# Patient Record
Sex: MALE | Race: WHITE | NOT HISPANIC OR LATINO | Employment: UNEMPLOYED | URBAN - METROPOLITAN AREA
[De-identification: names, ages, dates, MRNs, and addresses within clinical notes are randomized per-mention and may not be internally consistent; named-entity substitution may affect disease eponyms.]

---

## 2017-01-04 ENCOUNTER — OFFICE VISIT - HEALTHEAST (OUTPATIENT)
Dept: VASCULAR SURGERY | Facility: CLINIC | Age: 57
End: 2017-01-04

## 2017-01-04 DIAGNOSIS — F17.200 TOBACCO USE DISORDER: ICD-10-CM

## 2017-01-04 DIAGNOSIS — I87.2 VENOUS DERMATITIS: ICD-10-CM

## 2017-01-04 DIAGNOSIS — I87.2 VENOUS (PERIPHERAL) INSUFFICIENCY: ICD-10-CM

## 2017-01-04 DIAGNOSIS — Z86.718 HISTORY OF DVT (DEEP VEIN THROMBOSIS): ICD-10-CM

## 2017-01-11 ENCOUNTER — OFFICE VISIT - HEALTHEAST (OUTPATIENT)
Dept: VASCULAR SURGERY | Facility: CLINIC | Age: 57
End: 2017-01-11

## 2017-01-11 DIAGNOSIS — I87.2 VENOUS STASIS DERMATITIS OF BOTH LOWER EXTREMITIES: ICD-10-CM

## 2017-01-11 DIAGNOSIS — I87.2 VENOUS (PERIPHERAL) INSUFFICIENCY: ICD-10-CM

## 2017-01-11 DIAGNOSIS — Z86.718 HISTORY OF DVT (DEEP VEIN THROMBOSIS): ICD-10-CM

## 2017-01-18 ENCOUNTER — OFFICE VISIT - HEALTHEAST (OUTPATIENT)
Dept: VASCULAR SURGERY | Facility: CLINIC | Age: 57
End: 2017-01-18

## 2017-01-18 DIAGNOSIS — I87.303 VENOUS HYPERTENSION, CHRONIC, BILATERAL: ICD-10-CM

## 2017-01-18 DIAGNOSIS — I87.2 VENOUS STASIS DERMATITIS OF BOTH LOWER EXTREMITIES: ICD-10-CM

## 2017-01-25 ENCOUNTER — COMMUNICATION - HEALTHEAST (OUTPATIENT)
Dept: INTERNAL MEDICINE | Facility: CLINIC | Age: 57
End: 2017-01-25

## 2017-01-25 DIAGNOSIS — Z86.718 PERSONAL HISTORY OF VENOUS THROMBOSIS AND EMBOLISM: ICD-10-CM

## 2017-01-25 DIAGNOSIS — Z86.718 HISTORY OF DVT (DEEP VEIN THROMBOSIS): ICD-10-CM

## 2017-01-25 DIAGNOSIS — Z79.01 LONG TERM (CURRENT) USE OF ANTICOAGULANTS: ICD-10-CM

## 2017-01-25 DIAGNOSIS — I48.91 ATRIAL FIBRILLATION (H): ICD-10-CM

## 2017-01-26 ENCOUNTER — OFFICE VISIT - HEALTHEAST (OUTPATIENT)
Dept: INTERNAL MEDICINE | Facility: CLINIC | Age: 57
End: 2017-01-26

## 2017-01-26 ENCOUNTER — COMMUNICATION - HEALTHEAST (OUTPATIENT)
Dept: NURSING | Facility: CLINIC | Age: 57
End: 2017-01-26

## 2017-01-26 DIAGNOSIS — Z86.718 HISTORY OF DVT (DEEP VEIN THROMBOSIS): ICD-10-CM

## 2017-01-26 DIAGNOSIS — I48.91 ATRIAL FIBRILLATION, UNSPECIFIED TYPE (H): ICD-10-CM

## 2017-01-26 DIAGNOSIS — I48.91 A-FIB (H): ICD-10-CM

## 2017-01-26 DIAGNOSIS — I87.2 VENOUS (PERIPHERAL) INSUFFICIENCY: ICD-10-CM

## 2017-01-26 DIAGNOSIS — F17.200 TOBACCO USE DISORDER: ICD-10-CM

## 2017-01-26 ASSESSMENT — MIFFLIN-ST. JEOR: SCORE: 1668.55

## 2017-02-20 ENCOUNTER — COMMUNICATION - HEALTHEAST (OUTPATIENT)
Dept: NURSING | Facility: CLINIC | Age: 57
End: 2017-02-20

## 2017-02-23 ENCOUNTER — OFFICE VISIT - HEALTHEAST (OUTPATIENT)
Dept: VASCULAR SURGERY | Facility: CLINIC | Age: 57
End: 2017-02-23

## 2017-02-23 DIAGNOSIS — I87.303 VENOUS HYPERTENSION, CHRONIC, BILATERAL: ICD-10-CM

## 2017-02-23 DIAGNOSIS — I87.2 VENOUS (PERIPHERAL) INSUFFICIENCY: ICD-10-CM

## 2017-02-23 DIAGNOSIS — F17.200 TOBACCO USE DISORDER: ICD-10-CM

## 2017-02-23 DIAGNOSIS — I87.2 VENOUS STASIS DERMATITIS OF BOTH LOWER EXTREMITIES: ICD-10-CM

## 2017-02-23 DIAGNOSIS — Z86.718 HISTORY OF DVT (DEEP VEIN THROMBOSIS): ICD-10-CM

## 2017-02-27 ENCOUNTER — COMMUNICATION - HEALTHEAST (OUTPATIENT)
Dept: VASCULAR SURGERY | Facility: CLINIC | Age: 57
End: 2017-02-27

## 2017-02-27 ENCOUNTER — AMBULATORY - HEALTHEAST (OUTPATIENT)
Dept: CARE COORDINATION | Facility: CLINIC | Age: 57
End: 2017-02-27

## 2017-03-07 ENCOUNTER — COMMUNICATION - HEALTHEAST (OUTPATIENT)
Dept: NURSING | Facility: CLINIC | Age: 57
End: 2017-03-07

## 2017-03-07 DIAGNOSIS — I26.99 PULMONARY EMBOLISM (H): ICD-10-CM

## 2017-03-07 DIAGNOSIS — I48.91 ATRIAL FIBRILLATION, UNSPECIFIED TYPE (H): ICD-10-CM

## 2017-03-07 DIAGNOSIS — Z86.718 HISTORY OF DVT (DEEP VEIN THROMBOSIS): ICD-10-CM

## 2017-03-09 ENCOUNTER — COMMUNICATION - HEALTHEAST (OUTPATIENT)
Dept: NURSING | Facility: CLINIC | Age: 57
End: 2017-03-09

## 2017-03-16 ENCOUNTER — AMBULATORY - HEALTHEAST (OUTPATIENT)
Dept: LAB | Facility: CLINIC | Age: 57
End: 2017-03-16

## 2017-03-16 ENCOUNTER — COMMUNICATION - HEALTHEAST (OUTPATIENT)
Dept: NURSING | Facility: CLINIC | Age: 57
End: 2017-03-16

## 2017-03-16 DIAGNOSIS — Z86.718 HISTORY OF DVT (DEEP VEIN THROMBOSIS): ICD-10-CM

## 2017-03-16 DIAGNOSIS — I26.99 PULMONARY EMBOLISM (H): ICD-10-CM

## 2017-03-16 DIAGNOSIS — I48.91 A-FIB (H): ICD-10-CM

## 2017-03-16 DIAGNOSIS — I48.91 ATRIAL FIBRILLATION, UNSPECIFIED TYPE (H): ICD-10-CM

## 2017-04-03 ENCOUNTER — COMMUNICATION - HEALTHEAST (OUTPATIENT)
Dept: NURSING | Facility: CLINIC | Age: 57
End: 2017-04-03

## 2017-04-06 ENCOUNTER — COMMUNICATION - HEALTHEAST (OUTPATIENT)
Dept: NURSING | Facility: CLINIC | Age: 57
End: 2017-04-06

## 2017-04-06 ENCOUNTER — AMBULATORY - HEALTHEAST (OUTPATIENT)
Dept: LAB | Facility: CLINIC | Age: 57
End: 2017-04-06

## 2017-04-06 DIAGNOSIS — Z86.718 HISTORY OF DVT (DEEP VEIN THROMBOSIS): ICD-10-CM

## 2017-04-06 DIAGNOSIS — I48.91 A-FIB (H): ICD-10-CM

## 2017-04-06 DIAGNOSIS — I26.99 PULMONARY EMBOLISM (H): ICD-10-CM

## 2017-04-06 DIAGNOSIS — I48.91 ATRIAL FIBRILLATION, UNSPECIFIED TYPE (H): ICD-10-CM

## 2017-04-22 ENCOUNTER — COMMUNICATION - HEALTHEAST (OUTPATIENT)
Dept: INTERNAL MEDICINE | Facility: CLINIC | Age: 57
End: 2017-04-22

## 2017-05-01 ENCOUNTER — OFFICE VISIT - HEALTHEAST (OUTPATIENT)
Dept: INTERNAL MEDICINE | Facility: CLINIC | Age: 57
End: 2017-05-01

## 2017-05-01 DIAGNOSIS — F17.200 TOBACCO USE DISORDER: ICD-10-CM

## 2017-05-01 DIAGNOSIS — E78.2 MIXED HYPERLIPIDEMIA: ICD-10-CM

## 2017-05-01 DIAGNOSIS — I48.91 ATRIAL FIBRILLATION, UNSPECIFIED TYPE (H): ICD-10-CM

## 2017-05-01 DIAGNOSIS — R73.9 HYPERGLYCEMIA: ICD-10-CM

## 2017-05-01 DIAGNOSIS — I26.99 PULMONARY EMBOLISM (H): ICD-10-CM

## 2017-05-01 DIAGNOSIS — I87.2 VENOUS (PERIPHERAL) INSUFFICIENCY: ICD-10-CM

## 2017-05-01 DIAGNOSIS — M25.552 LEFT HIP PAIN: ICD-10-CM

## 2017-05-01 DIAGNOSIS — Z86.718 HISTORY OF DVT (DEEP VEIN THROMBOSIS): ICD-10-CM

## 2017-05-01 LAB — HBA1C MFR BLD: 5.8 % (ref 3.5–6)

## 2017-05-01 ASSESSMENT — MIFFLIN-ST. JEOR: SCORE: 1704.83

## 2017-05-03 ENCOUNTER — COMMUNICATION - HEALTHEAST (OUTPATIENT)
Dept: INTERNAL MEDICINE | Facility: CLINIC | Age: 57
End: 2017-05-03

## 2017-05-08 ENCOUNTER — COMMUNICATION - HEALTHEAST (OUTPATIENT)
Dept: NURSING | Facility: CLINIC | Age: 57
End: 2017-05-08

## 2017-05-08 DIAGNOSIS — Z86.718 HISTORY OF DVT (DEEP VEIN THROMBOSIS): ICD-10-CM

## 2017-05-08 DIAGNOSIS — I48.91 A-FIB (H): ICD-10-CM

## 2017-05-15 ENCOUNTER — COMMUNICATION - HEALTHEAST (OUTPATIENT)
Dept: INTERNAL MEDICINE | Facility: CLINIC | Age: 57
End: 2017-05-15

## 2017-05-15 DIAGNOSIS — Z86.718 HISTORY OF DVT (DEEP VEIN THROMBOSIS): ICD-10-CM

## 2017-05-15 DIAGNOSIS — I48.91 ATRIAL FIBRILLATION (H): ICD-10-CM

## 2017-05-15 DIAGNOSIS — Z86.718 PERSONAL HISTORY OF VENOUS THROMBOSIS AND EMBOLISM: ICD-10-CM

## 2017-05-15 DIAGNOSIS — Z79.01 LONG TERM (CURRENT) USE OF ANTICOAGULANTS: ICD-10-CM

## 2017-05-16 ENCOUNTER — COMMUNICATION - HEALTHEAST (OUTPATIENT)
Dept: INTERNAL MEDICINE | Facility: CLINIC | Age: 57
End: 2017-05-16

## 2017-05-18 ENCOUNTER — COMMUNICATION - HEALTHEAST (OUTPATIENT)
Dept: NURSING | Facility: CLINIC | Age: 57
End: 2017-05-18

## 2017-05-18 ENCOUNTER — AMBULATORY - HEALTHEAST (OUTPATIENT)
Dept: LAB | Facility: CLINIC | Age: 57
End: 2017-05-18

## 2017-05-18 ENCOUNTER — OFFICE VISIT - HEALTHEAST (OUTPATIENT)
Dept: VASCULAR SURGERY | Facility: CLINIC | Age: 57
End: 2017-05-18

## 2017-05-18 DIAGNOSIS — I48.91 A-FIB (H): ICD-10-CM

## 2017-05-18 DIAGNOSIS — I87.303 VENOUS HYPERTENSION, CHRONIC, BILATERAL: ICD-10-CM

## 2017-05-18 DIAGNOSIS — I48.91 ATRIAL FIBRILLATION, UNSPECIFIED TYPE (H): ICD-10-CM

## 2017-05-18 DIAGNOSIS — I87.2 VENOUS STASIS DERMATITIS OF BOTH LOWER EXTREMITIES: ICD-10-CM

## 2017-05-18 DIAGNOSIS — Z86.718 HISTORY OF DVT (DEEP VEIN THROMBOSIS): ICD-10-CM

## 2017-05-18 DIAGNOSIS — I87.2 VENOUS (PERIPHERAL) INSUFFICIENCY: ICD-10-CM

## 2017-05-18 DIAGNOSIS — I26.99 PULMONARY EMBOLISM (H): ICD-10-CM

## 2017-05-20 ENCOUNTER — COMMUNICATION - HEALTHEAST (OUTPATIENT)
Dept: INTERNAL MEDICINE | Facility: CLINIC | Age: 57
End: 2017-05-20

## 2017-05-23 ENCOUNTER — RECORDS - HEALTHEAST (OUTPATIENT)
Dept: ADMINISTRATIVE | Facility: OTHER | Age: 57
End: 2017-05-23

## 2017-06-28 ENCOUNTER — AMBULATORY - HEALTHEAST (OUTPATIENT)
Dept: LAB | Facility: CLINIC | Age: 57
End: 2017-06-28

## 2017-06-28 ENCOUNTER — COMMUNICATION - HEALTHEAST (OUTPATIENT)
Dept: NURSING | Facility: CLINIC | Age: 57
End: 2017-06-28

## 2017-06-28 ENCOUNTER — AMBULATORY - HEALTHEAST (OUTPATIENT)
Dept: NURSING | Facility: CLINIC | Age: 57
End: 2017-06-28

## 2017-06-28 DIAGNOSIS — I48.91 A-FIB (H): ICD-10-CM

## 2017-06-28 DIAGNOSIS — Z23 IMMUNIZATION DUE: ICD-10-CM

## 2017-06-28 DIAGNOSIS — Z86.718 HISTORY OF DVT (DEEP VEIN THROMBOSIS): ICD-10-CM

## 2017-06-28 DIAGNOSIS — I48.91 ATRIAL FIBRILLATION, UNSPECIFIED TYPE (H): ICD-10-CM

## 2017-06-28 DIAGNOSIS — I26.99 PULMONARY EMBOLISM (H): ICD-10-CM

## 2017-08-16 ENCOUNTER — COMMUNICATION - HEALTHEAST (OUTPATIENT)
Dept: NURSING | Facility: CLINIC | Age: 57
End: 2017-08-16

## 2017-08-23 ENCOUNTER — OFFICE VISIT - HEALTHEAST (OUTPATIENT)
Dept: VASCULAR SURGERY | Facility: CLINIC | Age: 57
End: 2017-08-23

## 2017-08-23 ENCOUNTER — AMBULATORY - HEALTHEAST (OUTPATIENT)
Dept: LAB | Facility: CLINIC | Age: 57
End: 2017-08-23

## 2017-08-23 ENCOUNTER — COMMUNICATION - HEALTHEAST (OUTPATIENT)
Dept: NURSING | Facility: CLINIC | Age: 57
End: 2017-08-23

## 2017-08-23 DIAGNOSIS — Z86.718 HISTORY OF DVT (DEEP VEIN THROMBOSIS): ICD-10-CM

## 2017-08-23 DIAGNOSIS — I87.2 VENOUS STASIS DERMATITIS OF BOTH LOWER EXTREMITIES: ICD-10-CM

## 2017-08-23 DIAGNOSIS — I48.91 ATRIAL FIBRILLATION, UNSPECIFIED TYPE (H): ICD-10-CM

## 2017-08-23 DIAGNOSIS — I26.99 PULMONARY EMBOLISM (H): ICD-10-CM

## 2017-08-23 DIAGNOSIS — B36.9 FUNGAL INFECTION OF SKIN: ICD-10-CM

## 2017-08-23 DIAGNOSIS — I48.91 A-FIB (H): ICD-10-CM

## 2017-08-23 DIAGNOSIS — I87.2 VENOUS STASIS DERMATITIS OF LEFT LOWER EXTREMITY: ICD-10-CM

## 2017-09-06 ENCOUNTER — COMMUNICATION - HEALTHEAST (OUTPATIENT)
Dept: NURSING | Facility: CLINIC | Age: 57
End: 2017-09-06

## 2017-09-11 ENCOUNTER — COMMUNICATION - HEALTHEAST (OUTPATIENT)
Dept: INTERNAL MEDICINE | Facility: CLINIC | Age: 57
End: 2017-09-11

## 2017-09-11 DIAGNOSIS — Z86.718 HISTORY OF DVT (DEEP VEIN THROMBOSIS): ICD-10-CM

## 2017-09-11 DIAGNOSIS — Z79.01 LONG TERM (CURRENT) USE OF ANTICOAGULANTS: ICD-10-CM

## 2017-09-11 DIAGNOSIS — Z86.718 PERSONAL HISTORY OF VENOUS THROMBOSIS AND EMBOLISM: ICD-10-CM

## 2017-09-11 DIAGNOSIS — I48.91 ATRIAL FIBRILLATION (H): ICD-10-CM

## 2017-09-20 ENCOUNTER — AMBULATORY - HEALTHEAST (OUTPATIENT)
Dept: LAB | Facility: CLINIC | Age: 57
End: 2017-09-20

## 2017-09-20 ENCOUNTER — OFFICE VISIT - HEALTHEAST (OUTPATIENT)
Dept: VASCULAR SURGERY | Facility: CLINIC | Age: 57
End: 2017-09-20

## 2017-09-20 ENCOUNTER — COMMUNICATION - HEALTHEAST (OUTPATIENT)
Dept: NURSING | Facility: CLINIC | Age: 57
End: 2017-09-20

## 2017-09-20 DIAGNOSIS — I48.91 ATRIAL FIBRILLATION, UNSPECIFIED TYPE (H): ICD-10-CM

## 2017-09-20 DIAGNOSIS — Z86.718 HISTORY OF DVT (DEEP VEIN THROMBOSIS): ICD-10-CM

## 2017-09-20 DIAGNOSIS — I26.99 PULMONARY EMBOLISM (H): ICD-10-CM

## 2017-09-20 DIAGNOSIS — I48.91 A-FIB (H): ICD-10-CM

## 2017-09-20 DIAGNOSIS — B36.9 FUNGAL INFECTION OF SKIN: ICD-10-CM

## 2017-09-20 DIAGNOSIS — F17.200 TOBACCO USE DISORDER: ICD-10-CM

## 2017-11-01 ENCOUNTER — OFFICE VISIT - HEALTHEAST (OUTPATIENT)
Dept: INTERNAL MEDICINE | Facility: CLINIC | Age: 57
End: 2017-11-01

## 2017-11-01 ENCOUNTER — RECORDS - HEALTHEAST (OUTPATIENT)
Dept: GENERAL RADIOLOGY | Facility: CLINIC | Age: 57
End: 2017-11-01

## 2017-11-01 ENCOUNTER — COMMUNICATION - HEALTHEAST (OUTPATIENT)
Dept: NURSING | Facility: CLINIC | Age: 57
End: 2017-11-01

## 2017-11-01 DIAGNOSIS — I48.91 A-FIB (H): ICD-10-CM

## 2017-11-01 DIAGNOSIS — I48.91 ATRIAL FIBRILLATION WITH RAPID VENTRICULAR RESPONSE (H): ICD-10-CM

## 2017-11-01 DIAGNOSIS — Z86.718 HISTORY OF DVT (DEEP VEIN THROMBOSIS): ICD-10-CM

## 2017-11-01 DIAGNOSIS — I87.2 VENOUS (PERIPHERAL) INSUFFICIENCY: ICD-10-CM

## 2017-11-01 DIAGNOSIS — E11.9 T2DM (TYPE 2 DIABETES MELLITUS) (H): ICD-10-CM

## 2017-11-01 DIAGNOSIS — R19.00 PELVIC MASS: ICD-10-CM

## 2017-11-01 DIAGNOSIS — R19.00 INTRA-ABDOMINAL AND PELVIC SWELLING, MASS AND LUMP, UNSPECIFIED SITE: ICD-10-CM

## 2017-11-01 DIAGNOSIS — Z23 NEED FOR IMMUNIZATION AGAINST INFLUENZA: ICD-10-CM

## 2017-11-01 DIAGNOSIS — M25.559 HIP PAIN: ICD-10-CM

## 2017-11-01 DIAGNOSIS — I26.99 PULMONARY EMBOLISM (H): ICD-10-CM

## 2017-11-01 DIAGNOSIS — I48.91 ATRIAL FIBRILLATION, UNSPECIFIED TYPE (H): ICD-10-CM

## 2017-11-01 DIAGNOSIS — L30.9 DERMATITIS: ICD-10-CM

## 2017-11-01 ASSESSMENT — MIFFLIN-ST. JEOR: SCORE: 1673.08

## 2017-11-02 LAB — HBA1C MFR BLD: 5.8 % (ref 3.5–6)

## 2017-11-03 ENCOUNTER — COMMUNICATION - HEALTHEAST (OUTPATIENT)
Dept: INTERNAL MEDICINE | Facility: CLINIC | Age: 57
End: 2017-11-03

## 2017-11-06 ENCOUNTER — COMMUNICATION - HEALTHEAST (OUTPATIENT)
Dept: INTERNAL MEDICINE | Facility: CLINIC | Age: 57
End: 2017-11-06

## 2017-11-06 DIAGNOSIS — E11.9 TYPE 2 DIABETES MELLITUS (H): ICD-10-CM

## 2017-11-08 ENCOUNTER — AMBULATORY - HEALTHEAST (OUTPATIENT)
Dept: LAB | Facility: CLINIC | Age: 57
End: 2017-11-08

## 2017-11-08 ENCOUNTER — COMMUNICATION - HEALTHEAST (OUTPATIENT)
Dept: INTERNAL MEDICINE | Facility: CLINIC | Age: 57
End: 2017-11-08

## 2017-11-08 ENCOUNTER — OFFICE VISIT - HEALTHEAST (OUTPATIENT)
Dept: VASCULAR SURGERY | Facility: CLINIC | Age: 57
End: 2017-11-08

## 2017-11-08 ENCOUNTER — COMMUNICATION - HEALTHEAST (OUTPATIENT)
Dept: NURSING | Facility: CLINIC | Age: 57
End: 2017-11-08

## 2017-11-08 DIAGNOSIS — I26.99 PULMONARY EMBOLISM (H): ICD-10-CM

## 2017-11-08 DIAGNOSIS — I87.2 VENOUS (PERIPHERAL) INSUFFICIENCY: ICD-10-CM

## 2017-11-08 DIAGNOSIS — E11.9 DM TYPE 2 (DIABETES MELLITUS, TYPE 2) (H): ICD-10-CM

## 2017-11-08 DIAGNOSIS — Z86.718 HISTORY OF DVT (DEEP VEIN THROMBOSIS): ICD-10-CM

## 2017-11-08 DIAGNOSIS — L30.9 DERMATITIS: ICD-10-CM

## 2017-11-08 DIAGNOSIS — I48.91 A-FIB (H): ICD-10-CM

## 2017-11-08 DIAGNOSIS — I48.91 ATRIAL FIBRILLATION, UNSPECIFIED TYPE (H): ICD-10-CM

## 2017-11-10 ENCOUNTER — HOSPITAL ENCOUNTER (OUTPATIENT)
Dept: CARDIOLOGY | Facility: CLINIC | Age: 57
Discharge: HOME | End: 2017-11-10
Attending: INTERNAL MEDICINE

## 2017-11-10 DIAGNOSIS — I48.91 ATRIAL FIBRILLATION WITH RAPID VENTRICULAR RESPONSE (H): ICD-10-CM

## 2017-11-14 ENCOUNTER — RECORDS - HEALTHEAST (OUTPATIENT)
Dept: ADMINISTRATIVE | Facility: OTHER | Age: 57
End: 2017-11-14

## 2017-11-15 ENCOUNTER — COMMUNICATION - HEALTHEAST (OUTPATIENT)
Dept: INTERNAL MEDICINE | Facility: CLINIC | Age: 57
End: 2017-11-15

## 2017-11-15 DIAGNOSIS — I48.91 ATRIAL FIBRILLATION (H): ICD-10-CM

## 2017-11-16 ENCOUNTER — OFFICE VISIT - HEALTHEAST (OUTPATIENT)
Dept: INTERNAL MEDICINE | Facility: CLINIC | Age: 57
End: 2017-11-16

## 2017-11-16 DIAGNOSIS — R74.8 ELEVATED LIVER ENZYMES: ICD-10-CM

## 2017-11-16 DIAGNOSIS — D75.89 MACROCYTOSIS: ICD-10-CM

## 2017-11-16 DIAGNOSIS — I48.91 ATRIAL FIBRILLATION WITH RAPID VENTRICULAR RESPONSE (H): ICD-10-CM

## 2017-11-16 DIAGNOSIS — M25.559 HIP PAIN: ICD-10-CM

## 2017-11-16 ASSESSMENT — MIFFLIN-ST. JEOR: SCORE: 1673.08

## 2017-11-20 ENCOUNTER — OFFICE VISIT - HEALTHEAST (OUTPATIENT)
Dept: CARDIOLOGY | Facility: CLINIC | Age: 57
End: 2017-11-20

## 2017-11-20 DIAGNOSIS — I48.19 PERSISTENT ATRIAL FIBRILLATION (H): ICD-10-CM

## 2017-11-20 ASSESSMENT — MIFFLIN-ST. JEOR: SCORE: 1677.16

## 2017-11-21 ENCOUNTER — COMMUNICATION - HEALTHEAST (OUTPATIENT)
Dept: INTERNAL MEDICINE | Facility: CLINIC | Age: 57
End: 2017-11-21

## 2017-11-22 ENCOUNTER — COMMUNICATION - HEALTHEAST (OUTPATIENT)
Dept: NURSING | Facility: CLINIC | Age: 57
End: 2017-11-22

## 2017-11-22 ENCOUNTER — OFFICE VISIT - HEALTHEAST (OUTPATIENT)
Dept: VASCULAR SURGERY | Facility: CLINIC | Age: 57
End: 2017-11-22

## 2017-11-22 ENCOUNTER — AMBULATORY - HEALTHEAST (OUTPATIENT)
Dept: LAB | Facility: CLINIC | Age: 57
End: 2017-11-22

## 2017-11-22 DIAGNOSIS — Z86.718 HISTORY OF DVT (DEEP VEIN THROMBOSIS): ICD-10-CM

## 2017-11-22 DIAGNOSIS — I26.99 PULMONARY EMBOLISM (H): ICD-10-CM

## 2017-11-22 DIAGNOSIS — L30.9 DERMATITIS: ICD-10-CM

## 2017-11-22 DIAGNOSIS — I48.91 ATRIAL FIBRILLATION, UNSPECIFIED TYPE (H): ICD-10-CM

## 2017-11-22 DIAGNOSIS — I48.91 A-FIB (H): ICD-10-CM

## 2017-11-22 DIAGNOSIS — I87.2 VENOUS (PERIPHERAL) INSUFFICIENCY: ICD-10-CM

## 2017-11-22 LAB
ATRIAL RATE - MUSE: 94 BPM
DIASTOLIC BLOOD PRESSURE - MUSE: NORMAL MMHG
INTERPRETATION ECG - MUSE: NORMAL
P AXIS - MUSE: NORMAL DEGREES
PR INTERVAL - MUSE: NORMAL MS
QRS DURATION - MUSE: 86 MS
QT - MUSE: 340 MS
QTC - MUSE: 457 MS
R AXIS - MUSE: -25 DEGREES
SYSTOLIC BLOOD PRESSURE - MUSE: NORMAL MMHG
T AXIS - MUSE: 22 DEGREES
VENTRICULAR RATE- MUSE: 109 BPM

## 2017-11-27 ENCOUNTER — HOSPITAL ENCOUNTER (OUTPATIENT)
Dept: CARDIOLOGY | Facility: CLINIC | Age: 57
Discharge: HOME | End: 2017-11-27
Attending: INTERNAL MEDICINE

## 2017-11-27 DIAGNOSIS — I48.19 PERSISTENT ATRIAL FIBRILLATION (H): ICD-10-CM

## 2017-11-27 LAB
AORTIC VALVE MEAN VELOCITY: 62.9 CM/S
ASCENDING AORTA: 3.3 CM
AV DIMENSIONLESS INDEX VTI: 1.1
AV MEAN GRADIENT: 2 MMHG
AV PEAK GRADIENT: 3.1 MMHG
AV VALVE AREA: 4.2 CM2
AV VELOCITY RATIO: 1
BSA FOR ECHO PROCEDURE: 2.05 M2
CV BLOOD PRESSURE: NORMAL MMHG
CV ECHO HEIGHT: 72 IN
CV ECHO WEIGHT: 183 LBS
DOP CALC AO PEAK VEL: 88.7 CM/S
DOP CALC AO VTI: 13 CM
DOP CALC LVOT AREA: 3.8 CM2
DOP CALC LVOT DIAMETER: 2.2 CM
DOP CALC LVOT PEAK VEL: 87.1 CM/S
DOP CALC LVOT STROKE VOLUME: 55.1 CM3
DOP CALCLVOT PEAK VEL VTI: 14.5 CM
EJECTION FRACTION: 51 % (ref 55–75)
FRACTIONAL SHORTENING: 32.6 % (ref 28–44)
INTERVENTRICULAR SEPTUM IN END DIASTOLE: 1.1 CM (ref 0.6–1)
IVS/PW RATIO: 1.4
LA AREA 1: 18.3 CM2
LA AREA 2: 26.2 CM2
LEFT ATRIUM LENGTH: 5.34 CM
LEFT ATRIUM SIZE: 3.4 CM
LEFT ATRIUM VOLUME INDEX: 37.2 ML/M2
LEFT ATRIUM VOLUME: 76.3 CM3
LEFT VENTRICLE CARDIAC INDEX: 3.1 L/MIN/M2
LEFT VENTRICLE CARDIAC OUTPUT: 6.3 L/MIN
LEFT VENTRICLE DIASTOLIC VOLUME INDEX: 32.7 CM3/M2 (ref 34–74)
LEFT VENTRICLE DIASTOLIC VOLUME: 67 CM3 (ref 62–150)
LEFT VENTRICLE HEART RATE: 115 BPM
LEFT VENTRICLE MASS INDEX: 72.2 G/M2
LEFT VENTRICLE SYSTOLIC VOLUME INDEX: 16.1 CM3/M2 (ref 11–31)
LEFT VENTRICLE SYSTOLIC VOLUME: 33 CM3 (ref 21–61)
LEFT VENTRICULAR INTERNAL DIMENSION IN DIASTOLE: 4.6 CM (ref 4.2–5.8)
LEFT VENTRICULAR INTERNAL DIMENSION IN SYSTOLE: 3.1 CM (ref 2.5–4)
LEFT VENTRICULAR MASS: 148.1 G
LEFT VENTRICULAR OUTFLOW TRACT MEAN GRADIENT: 2 MMHG
LEFT VENTRICULAR OUTFLOW TRACT MEAN VELOCITY: 60.5 CM/S
LEFT VENTRICULAR OUTFLOW TRACT PEAK GRADIENT: 3 MMHG
LEFT VENTRICULAR POSTERIOR WALL IN END DIASTOLE: 0.8 CM (ref 0.6–1)
LV STROKE VOLUME INDEX: 26.9 ML/M2
MV AVERAGE E/E' RATIO: 6.2 CM/S
MV DECELERATION TIME: 155 MS
MV E'TISSUE VEL-LAT: 15.2 CM/S
MV E'TISSUE VEL-MED: 11.4 CM/S
MV LATERAL E/E' RATIO: 5.4
MV MEDIAL E/E' RATIO: 7.3
MV PEAK E VELOCITY: 82.8 CM/S
NUC REST DIASTOLIC VOLUME INDEX: 2928 LBS
NUC REST SYSTOLIC VOLUME INDEX: 72 IN
TRICUSPID VALVE ANULAR PLANE SYSTOLIC EXCURSION: 2.2 CM

## 2017-11-27 ASSESSMENT — MIFFLIN-ST. JEOR: SCORE: 1673.08

## 2017-12-19 ENCOUNTER — COMMUNICATION - HEALTHEAST (OUTPATIENT)
Dept: NURSING | Facility: CLINIC | Age: 57
End: 2017-12-19

## 2017-12-19 DIAGNOSIS — I26.99 PULMONARY EMBOLISM (H): ICD-10-CM

## 2017-12-19 DIAGNOSIS — I48.91 ATRIAL FIBRILLATION, UNSPECIFIED TYPE (H): ICD-10-CM

## 2017-12-19 DIAGNOSIS — Z86.718 HISTORY OF DVT (DEEP VEIN THROMBOSIS): ICD-10-CM

## 2017-12-20 ENCOUNTER — COMMUNICATION - HEALTHEAST (OUTPATIENT)
Dept: NURSING | Facility: CLINIC | Age: 57
End: 2017-12-20

## 2017-12-26 ENCOUNTER — COMMUNICATION - HEALTHEAST (OUTPATIENT)
Dept: NURSING | Facility: CLINIC | Age: 57
End: 2017-12-26

## 2017-12-26 ENCOUNTER — AMBULATORY - HEALTHEAST (OUTPATIENT)
Dept: LAB | Facility: CLINIC | Age: 57
End: 2017-12-26

## 2017-12-26 DIAGNOSIS — Z86.718 HISTORY OF DVT (DEEP VEIN THROMBOSIS): ICD-10-CM

## 2017-12-26 DIAGNOSIS — I48.91 A-FIB (H): ICD-10-CM

## 2017-12-26 DIAGNOSIS — I26.99 PULMONARY EMBOLISM (H): ICD-10-CM

## 2017-12-26 DIAGNOSIS — I48.91 ATRIAL FIBRILLATION, UNSPECIFIED TYPE (H): ICD-10-CM

## 2018-01-02 ENCOUNTER — COMMUNICATION - HEALTHEAST (OUTPATIENT)
Dept: INTERNAL MEDICINE | Facility: CLINIC | Age: 58
End: 2018-01-02

## 2018-01-02 DIAGNOSIS — I48.91 ATRIAL FIBRILLATION (H): ICD-10-CM

## 2018-01-02 DIAGNOSIS — Z86.718 PERSONAL HISTORY OF VENOUS THROMBOSIS AND EMBOLISM: ICD-10-CM

## 2018-01-02 DIAGNOSIS — Z79.01 LONG TERM CURRENT USE OF ANTICOAGULANT THERAPY: ICD-10-CM

## 2018-01-02 DIAGNOSIS — Z86.718 HISTORY OF DVT (DEEP VEIN THROMBOSIS): ICD-10-CM

## 2018-01-04 ENCOUNTER — COMMUNICATION - HEALTHEAST (OUTPATIENT)
Dept: CARDIOLOGY | Facility: CLINIC | Age: 58
End: 2018-01-04

## 2018-01-04 DIAGNOSIS — I48.19 PERSISTENT ATRIAL FIBRILLATION (H): ICD-10-CM

## 2018-01-29 ENCOUNTER — COMMUNICATION - HEALTHEAST (OUTPATIENT)
Dept: INTERNAL MEDICINE | Facility: CLINIC | Age: 58
End: 2018-01-29

## 2018-02-13 ENCOUNTER — COMMUNICATION - HEALTHEAST (OUTPATIENT)
Dept: NURSING | Facility: CLINIC | Age: 58
End: 2018-02-13

## 2018-02-22 ENCOUNTER — AMBULATORY - HEALTHEAST (OUTPATIENT)
Dept: LAB | Facility: CLINIC | Age: 58
End: 2018-02-22

## 2018-02-22 ENCOUNTER — COMMUNICATION - HEALTHEAST (OUTPATIENT)
Dept: NURSING | Facility: CLINIC | Age: 58
End: 2018-02-22

## 2018-02-22 DIAGNOSIS — I48.91 ATRIAL FIBRILLATION, UNSPECIFIED TYPE (H): ICD-10-CM

## 2018-02-22 DIAGNOSIS — Z86.718 HISTORY OF DVT (DEEP VEIN THROMBOSIS): ICD-10-CM

## 2018-02-22 DIAGNOSIS — I26.99 PULMONARY EMBOLISM (H): ICD-10-CM

## 2018-02-22 DIAGNOSIS — I48.91 A-FIB (H): ICD-10-CM

## 2018-02-22 LAB — INR PPP: 2.7 (ref 0.9–1.1)

## 2018-04-12 ENCOUNTER — COMMUNICATION - HEALTHEAST (OUTPATIENT)
Dept: ANTICOAGULATION | Facility: CLINIC | Age: 58
End: 2018-04-12

## 2018-04-13 ENCOUNTER — COMMUNICATION - HEALTHEAST (OUTPATIENT)
Dept: INTERNAL MEDICINE | Facility: CLINIC | Age: 58
End: 2018-04-13

## 2018-04-13 DIAGNOSIS — E11.9 TYPE 2 DIABETES MELLITUS (H): ICD-10-CM

## 2018-04-26 ENCOUNTER — COMMUNICATION - HEALTHEAST (OUTPATIENT)
Dept: ANTICOAGULATION | Facility: CLINIC | Age: 58
End: 2018-04-26

## 2018-04-26 ENCOUNTER — AMBULATORY - HEALTHEAST (OUTPATIENT)
Dept: LAB | Facility: CLINIC | Age: 58
End: 2018-04-26

## 2018-04-26 DIAGNOSIS — Z86.718 HISTORY OF DVT (DEEP VEIN THROMBOSIS): ICD-10-CM

## 2018-04-26 DIAGNOSIS — I26.99 PULMONARY EMBOLISM (H): ICD-10-CM

## 2018-04-26 DIAGNOSIS — I48.91 ATRIAL FIBRILLATION, UNSPECIFIED TYPE (H): ICD-10-CM

## 2018-04-26 DIAGNOSIS — I48.91 A-FIB (H): ICD-10-CM

## 2018-04-26 LAB — INR PPP: 3.2 (ref 0.9–1.1)

## 2018-04-30 ENCOUNTER — COMMUNICATION - HEALTHEAST (OUTPATIENT)
Dept: INTERNAL MEDICINE | Facility: CLINIC | Age: 58
End: 2018-04-30

## 2018-05-17 ENCOUNTER — COMMUNICATION - HEALTHEAST (OUTPATIENT)
Dept: ANTICOAGULATION | Facility: CLINIC | Age: 58
End: 2018-05-17

## 2018-05-22 ENCOUNTER — COMMUNICATION - HEALTHEAST (OUTPATIENT)
Dept: ANTICOAGULATION | Facility: CLINIC | Age: 58
End: 2018-05-22

## 2018-05-22 ENCOUNTER — AMBULATORY - HEALTHEAST (OUTPATIENT)
Dept: LAB | Facility: CLINIC | Age: 58
End: 2018-05-22

## 2018-05-22 DIAGNOSIS — I26.99 PULMONARY EMBOLISM (H): ICD-10-CM

## 2018-05-22 DIAGNOSIS — I48.91 A-FIB (H): ICD-10-CM

## 2018-05-22 DIAGNOSIS — Z86.718 HISTORY OF DVT (DEEP VEIN THROMBOSIS): ICD-10-CM

## 2018-05-22 DIAGNOSIS — I48.91 ATRIAL FIBRILLATION, UNSPECIFIED TYPE (H): ICD-10-CM

## 2018-05-22 LAB — INR PPP: 5.4 (ref 0.9–1.1)

## 2018-06-07 ENCOUNTER — COMMUNICATION - HEALTHEAST (OUTPATIENT)
Dept: ANTICOAGULATION | Facility: CLINIC | Age: 58
End: 2018-06-07

## 2018-06-08 ENCOUNTER — AMBULATORY - HEALTHEAST (OUTPATIENT)
Dept: LAB | Facility: CLINIC | Age: 58
End: 2018-06-08

## 2018-06-08 ENCOUNTER — COMMUNICATION - HEALTHEAST (OUTPATIENT)
Dept: ANTICOAGULATION | Facility: CLINIC | Age: 58
End: 2018-06-08

## 2018-06-08 DIAGNOSIS — Z86.718 HISTORY OF DVT (DEEP VEIN THROMBOSIS): ICD-10-CM

## 2018-06-08 DIAGNOSIS — I48.91 ATRIAL FIBRILLATION, UNSPECIFIED TYPE (H): ICD-10-CM

## 2018-06-08 DIAGNOSIS — I26.99 PULMONARY EMBOLISM (H): ICD-10-CM

## 2018-06-08 DIAGNOSIS — I48.91 A-FIB (H): ICD-10-CM

## 2018-06-08 LAB — INR PPP: 1.7 (ref 0.9–1.1)

## 2018-06-29 ENCOUNTER — COMMUNICATION - HEALTHEAST (OUTPATIENT)
Dept: ANTICOAGULATION | Facility: CLINIC | Age: 58
End: 2018-06-29

## 2018-07-05 ENCOUNTER — AMBULATORY - HEALTHEAST (OUTPATIENT)
Dept: LAB | Facility: CLINIC | Age: 58
End: 2018-07-05

## 2018-07-05 ENCOUNTER — COMMUNICATION - HEALTHEAST (OUTPATIENT)
Dept: ANTICOAGULATION | Facility: CLINIC | Age: 58
End: 2018-07-05

## 2018-07-05 DIAGNOSIS — I26.99 PULMONARY EMBOLISM (H): ICD-10-CM

## 2018-07-05 DIAGNOSIS — I48.91 A-FIB (H): ICD-10-CM

## 2018-07-05 DIAGNOSIS — Z86.718 HISTORY OF DVT (DEEP VEIN THROMBOSIS): ICD-10-CM

## 2018-07-05 DIAGNOSIS — I48.91 ATRIAL FIBRILLATION, UNSPECIFIED TYPE (H): ICD-10-CM

## 2018-07-05 LAB — INR PPP: 3.3 (ref 0.9–1.1)

## 2018-07-09 ENCOUNTER — COMMUNICATION - HEALTHEAST (OUTPATIENT)
Dept: INTERNAL MEDICINE | Facility: CLINIC | Age: 58
End: 2018-07-09

## 2018-07-09 DIAGNOSIS — I48.91 ATRIAL FIBRILLATION (H): ICD-10-CM

## 2018-07-09 DIAGNOSIS — Z86.718 PERSONAL HISTORY OF VENOUS THROMBOSIS AND EMBOLISM: ICD-10-CM

## 2018-07-09 DIAGNOSIS — Z79.01 LONG TERM CURRENT USE OF ANTICOAGULANT THERAPY: ICD-10-CM

## 2018-07-09 DIAGNOSIS — Z86.718 HISTORY OF DVT (DEEP VEIN THROMBOSIS): ICD-10-CM

## 2018-07-26 ENCOUNTER — COMMUNICATION - HEALTHEAST (OUTPATIENT)
Dept: ANTICOAGULATION | Facility: CLINIC | Age: 58
End: 2018-07-26

## 2018-08-02 ENCOUNTER — COMMUNICATION - HEALTHEAST (OUTPATIENT)
Dept: INTERNAL MEDICINE | Facility: CLINIC | Age: 58
End: 2018-08-02

## 2018-08-16 ENCOUNTER — COMMUNICATION - HEALTHEAST (OUTPATIENT)
Dept: ANTICOAGULATION | Facility: CLINIC | Age: 58
End: 2018-08-16

## 2018-08-16 ENCOUNTER — AMBULATORY - HEALTHEAST (OUTPATIENT)
Dept: LAB | Facility: CLINIC | Age: 58
End: 2018-08-16

## 2018-08-16 DIAGNOSIS — I48.91 A-FIB (H): ICD-10-CM

## 2018-08-16 DIAGNOSIS — I48.91 ATRIAL FIBRILLATION, UNSPECIFIED TYPE (H): ICD-10-CM

## 2018-08-16 DIAGNOSIS — I26.99 PULMONARY EMBOLISM (H): ICD-10-CM

## 2018-08-16 DIAGNOSIS — Z86.718 HISTORY OF DVT (DEEP VEIN THROMBOSIS): ICD-10-CM

## 2018-08-16 LAB — INR PPP: 2.7 (ref 0.9–1.1)

## 2018-09-01 ENCOUNTER — COMMUNICATION - HEALTHEAST (OUTPATIENT)
Dept: INTERNAL MEDICINE | Facility: CLINIC | Age: 58
End: 2018-09-01

## 2018-09-13 ENCOUNTER — COMMUNICATION - HEALTHEAST (OUTPATIENT)
Dept: ANTICOAGULATION | Facility: CLINIC | Age: 58
End: 2018-09-13

## 2018-09-14 ENCOUNTER — AMBULATORY - HEALTHEAST (OUTPATIENT)
Dept: LAB | Facility: CLINIC | Age: 58
End: 2018-09-14

## 2018-09-14 ENCOUNTER — COMMUNICATION - HEALTHEAST (OUTPATIENT)
Dept: ANTICOAGULATION | Facility: CLINIC | Age: 58
End: 2018-09-14

## 2018-09-14 DIAGNOSIS — I26.99 PULMONARY EMBOLISM (H): ICD-10-CM

## 2018-09-14 DIAGNOSIS — I48.91 A-FIB (H): ICD-10-CM

## 2018-09-14 DIAGNOSIS — I48.91 ATRIAL FIBRILLATION, UNSPECIFIED TYPE (H): ICD-10-CM

## 2018-09-14 DIAGNOSIS — Z86.718 HISTORY OF DVT (DEEP VEIN THROMBOSIS): ICD-10-CM

## 2018-09-14 LAB — INR PPP: 4.3 (ref 0.9–1.1)

## 2018-10-01 ENCOUNTER — COMMUNICATION - HEALTHEAST (OUTPATIENT)
Dept: ANTICOAGULATION | Facility: CLINIC | Age: 58
End: 2018-10-01

## 2018-10-01 ENCOUNTER — COMMUNICATION - HEALTHEAST (OUTPATIENT)
Dept: INTERNAL MEDICINE | Facility: CLINIC | Age: 58
End: 2018-10-01

## 2018-10-03 ENCOUNTER — COMMUNICATION - HEALTHEAST (OUTPATIENT)
Dept: INTERNAL MEDICINE | Facility: CLINIC | Age: 58
End: 2018-10-03

## 2018-10-11 ENCOUNTER — COMMUNICATION - HEALTHEAST (OUTPATIENT)
Dept: ANTICOAGULATION | Facility: CLINIC | Age: 58
End: 2018-10-11

## 2018-10-11 ENCOUNTER — AMBULATORY - HEALTHEAST (OUTPATIENT)
Dept: LAB | Facility: CLINIC | Age: 58
End: 2018-10-11

## 2018-10-11 DIAGNOSIS — I26.99 PULMONARY EMBOLISM (H): ICD-10-CM

## 2018-10-11 DIAGNOSIS — I48.91 ATRIAL FIBRILLATION, UNSPECIFIED TYPE (H): ICD-10-CM

## 2018-10-11 DIAGNOSIS — Z86.718 HISTORY OF DVT (DEEP VEIN THROMBOSIS): ICD-10-CM

## 2018-10-11 DIAGNOSIS — I48.20 CHRONIC ATRIAL FIBRILLATION (H): ICD-10-CM

## 2018-10-11 LAB — INR PPP: 1.6 (ref 0.9–1.1)

## 2018-10-15 ENCOUNTER — COMMUNICATION - HEALTHEAST (OUTPATIENT)
Dept: INTERNAL MEDICINE | Facility: CLINIC | Age: 58
End: 2018-10-15

## 2018-10-18 ENCOUNTER — COMMUNICATION - HEALTHEAST (OUTPATIENT)
Dept: ANTICOAGULATION | Facility: CLINIC | Age: 58
End: 2018-10-18

## 2018-10-18 ENCOUNTER — AMBULATORY - HEALTHEAST (OUTPATIENT)
Dept: LAB | Facility: CLINIC | Age: 58
End: 2018-10-18

## 2018-10-18 DIAGNOSIS — I48.91 ATRIAL FIBRILLATION, UNSPECIFIED TYPE (H): ICD-10-CM

## 2018-10-18 DIAGNOSIS — Z86.718 HISTORY OF DVT (DEEP VEIN THROMBOSIS): ICD-10-CM

## 2018-10-18 DIAGNOSIS — I26.99 PULMONARY EMBOLISM (H): ICD-10-CM

## 2018-10-18 DIAGNOSIS — I48.20 CHRONIC ATRIAL FIBRILLATION (H): ICD-10-CM

## 2018-10-18 LAB — INR PPP: 2.5 (ref 0.9–1.1)

## 2018-10-25 ENCOUNTER — COMMUNICATION - HEALTHEAST (OUTPATIENT)
Dept: INTERNAL MEDICINE | Facility: CLINIC | Age: 58
End: 2018-10-25

## 2018-10-25 DIAGNOSIS — E11.9 TYPE 2 DIABETES MELLITUS (H): ICD-10-CM

## 2018-10-31 ENCOUNTER — COMMUNICATION - HEALTHEAST (OUTPATIENT)
Dept: INTERNAL MEDICINE | Facility: CLINIC | Age: 58
End: 2018-10-31

## 2018-11-01 ENCOUNTER — COMMUNICATION - HEALTHEAST (OUTPATIENT)
Dept: ANTICOAGULATION | Facility: CLINIC | Age: 58
End: 2018-11-01

## 2018-11-01 ENCOUNTER — OFFICE VISIT - HEALTHEAST (OUTPATIENT)
Dept: INTERNAL MEDICINE | Facility: CLINIC | Age: 58
End: 2018-11-01

## 2018-11-01 DIAGNOSIS — R41.3 MEMORY LOSS: ICD-10-CM

## 2018-11-01 DIAGNOSIS — Z91.199 POOR COMPLIANCE: ICD-10-CM

## 2018-11-01 DIAGNOSIS — R73.9 HYPERGLYCEMIA: ICD-10-CM

## 2018-11-01 DIAGNOSIS — F17.200 TOBACCO USE DISORDER: ICD-10-CM

## 2018-11-01 DIAGNOSIS — D75.89 MACROCYTOSIS: ICD-10-CM

## 2018-11-01 DIAGNOSIS — M85.80 OSTEOPENIA: ICD-10-CM

## 2018-11-01 DIAGNOSIS — E78.2 MIXED HYPERLIPIDEMIA: ICD-10-CM

## 2018-11-01 DIAGNOSIS — Z72.0 TOBACCO ABUSE: ICD-10-CM

## 2018-11-01 DIAGNOSIS — I48.91 ATRIAL FIBRILLATION, UNSPECIFIED TYPE (H): ICD-10-CM

## 2018-11-01 DIAGNOSIS — Z86.718 HISTORY OF DVT (DEEP VEIN THROMBOSIS): ICD-10-CM

## 2018-11-01 DIAGNOSIS — I48.20 CHRONIC ATRIAL FIBRILLATION (H): ICD-10-CM

## 2018-11-01 DIAGNOSIS — Z79.899 HIGH RISK MEDICATION USE: ICD-10-CM

## 2018-11-01 DIAGNOSIS — I87.2 VENOUS (PERIPHERAL) INSUFFICIENCY: ICD-10-CM

## 2018-11-01 DIAGNOSIS — I26.99 PULMONARY EMBOLISM (H): ICD-10-CM

## 2018-11-01 DIAGNOSIS — Z00.00 ROUTINE GENERAL MEDICAL EXAMINATION AT A HEALTH CARE FACILITY: ICD-10-CM

## 2018-11-01 LAB
ALBUMIN SERPL-MCNC: 4.1 G/DL (ref 3.5–5)
ALP SERPL-CCNC: 46 U/L (ref 45–120)
ALT SERPL W P-5'-P-CCNC: 18 U/L (ref 0–45)
ANION GAP SERPL CALCULATED.3IONS-SCNC: 10 MMOL/L (ref 5–18)
AST SERPL W P-5'-P-CCNC: 30 U/L (ref 0–40)
ATRIAL RATE - MUSE: NORMAL BPM
BILIRUB SERPL-MCNC: 0.4 MG/DL (ref 0–1)
BUN SERPL-MCNC: 26 MG/DL (ref 8–22)
CALCIUM SERPL-MCNC: 10 MG/DL (ref 8.5–10.5)
CHLORIDE BLD-SCNC: 102 MMOL/L (ref 98–107)
CHOLEST SERPL-MCNC: 205 MG/DL
CO2 SERPL-SCNC: 27 MMOL/L (ref 22–31)
CREAT SERPL-MCNC: 1.13 MG/DL (ref 0.7–1.3)
DIASTOLIC BLOOD PRESSURE - MUSE: NORMAL MMHG
ERYTHROCYTE [DISTWIDTH] IN BLOOD BY AUTOMATED COUNT: 11 % (ref 11–14.5)
FASTING STATUS PATIENT QL REPORTED: YES
GFR SERPL CREATININE-BSD FRML MDRD: >60 ML/MIN/1.73M2
GLUCOSE BLD-MCNC: 84 MG/DL (ref 70–125)
HBA1C MFR BLD: 6 % (ref 3.5–6)
HCT VFR BLD AUTO: 47 % (ref 40–54)
HDLC SERPL-MCNC: 51 MG/DL
HGB BLD-MCNC: 15.7 G/DL (ref 14–18)
INR PPP: 2.6 (ref 0.9–1.1)
INTERPRETATION ECG - MUSE: NORMAL
LDLC SERPL CALC-MCNC: 139 MG/DL
MCH RBC QN AUTO: 34.8 PG (ref 27–34)
MCHC RBC AUTO-ENTMCNC: 33.3 G/DL (ref 32–36)
MCV RBC AUTO: 104 FL (ref 80–100)
P AXIS - MUSE: NORMAL DEGREES
PLATELET # BLD AUTO: 150 THOU/UL (ref 140–440)
PMV BLD AUTO: 8.8 FL (ref 7–10)
POTASSIUM BLD-SCNC: 5.9 MMOL/L (ref 3.5–5)
PR INTERVAL - MUSE: NORMAL MS
PROT SERPL-MCNC: 8.2 G/DL (ref 6–8)
QRS DURATION - MUSE: 86 MS
QT - MUSE: 306 MS
QTC - MUSE: 400 MS
R AXIS - MUSE: -52 DEGREES
RBC # BLD AUTO: 4.5 MILL/UL (ref 4.4–6.2)
SODIUM SERPL-SCNC: 139 MMOL/L (ref 136–145)
SYSTOLIC BLOOD PRESSURE - MUSE: NORMAL MMHG
T AXIS - MUSE: 37 DEGREES
VENTRICULAR RATE- MUSE: 103 BPM
WBC: 6.1 THOU/UL (ref 4–11)

## 2018-11-01 ASSESSMENT — MIFFLIN-ST. JEOR: SCORE: 1659.47

## 2018-11-06 ENCOUNTER — COMMUNICATION - HEALTHEAST (OUTPATIENT)
Dept: INTERNAL MEDICINE | Facility: CLINIC | Age: 58
End: 2018-11-06

## 2018-11-06 DIAGNOSIS — E87.5 HYPERKALEMIA: ICD-10-CM

## 2018-11-06 DIAGNOSIS — E78.00 HYPERCHOLESTEROLEMIA: ICD-10-CM

## 2018-11-15 ENCOUNTER — RECORDS - HEALTHEAST (OUTPATIENT)
Dept: ADMINISTRATIVE | Facility: OTHER | Age: 58
End: 2018-11-15

## 2018-11-15 ENCOUNTER — COMMUNICATION - HEALTHEAST (OUTPATIENT)
Dept: INTERNAL MEDICINE | Facility: CLINIC | Age: 58
End: 2018-11-15

## 2018-11-15 ENCOUNTER — RECORDS - HEALTHEAST (OUTPATIENT)
Dept: BONE DENSITY | Facility: CLINIC | Age: 58
End: 2018-11-15

## 2018-11-15 DIAGNOSIS — M85.80 OTHER SPECIFIED DISORDERS OF BONE DENSITY AND STRUCTURE, UNSPECIFIED SITE: ICD-10-CM

## 2018-11-16 ENCOUNTER — OFFICE VISIT - HEALTHEAST (OUTPATIENT)
Dept: CARDIOLOGY | Facility: CLINIC | Age: 58
End: 2018-11-16

## 2018-11-16 DIAGNOSIS — I48.20 CHRONIC ATRIAL FIBRILLATION (H): ICD-10-CM

## 2018-11-16 ASSESSMENT — MIFFLIN-ST. JEOR: SCORE: 1677.62

## 2018-11-19 ENCOUNTER — COMMUNICATION - HEALTHEAST (OUTPATIENT)
Dept: INTERNAL MEDICINE | Facility: CLINIC | Age: 58
End: 2018-11-19

## 2018-11-20 ENCOUNTER — COMMUNICATION - HEALTHEAST (OUTPATIENT)
Dept: INTERNAL MEDICINE | Facility: CLINIC | Age: 58
End: 2018-11-20

## 2018-11-21 ENCOUNTER — AMBULATORY - HEALTHEAST (OUTPATIENT)
Dept: LAB | Facility: CLINIC | Age: 58
End: 2018-11-21

## 2018-11-21 ENCOUNTER — COMMUNICATION - HEALTHEAST (OUTPATIENT)
Dept: ANTICOAGULATION | Facility: CLINIC | Age: 58
End: 2018-11-21

## 2018-11-21 ENCOUNTER — OFFICE VISIT - HEALTHEAST (OUTPATIENT)
Dept: VASCULAR SURGERY | Facility: CLINIC | Age: 58
End: 2018-11-21

## 2018-11-21 DIAGNOSIS — L29.9 LOCALIZED PRURITUS: ICD-10-CM

## 2018-11-21 DIAGNOSIS — I48.91 ATRIAL FIBRILLATION, UNSPECIFIED TYPE (H): ICD-10-CM

## 2018-11-21 DIAGNOSIS — E87.5 HYPERKALEMIA: ICD-10-CM

## 2018-11-21 DIAGNOSIS — I87.2 CHRONIC VENOUS INSUFFICIENCY: ICD-10-CM

## 2018-11-21 DIAGNOSIS — Z86.718 HISTORY OF DVT (DEEP VEIN THROMBOSIS): ICD-10-CM

## 2018-11-21 DIAGNOSIS — I26.99 PULMONARY EMBOLISM (H): ICD-10-CM

## 2018-11-21 LAB
INR PPP: 2.3 (ref 0.9–1.1)
POTASSIUM BLD-SCNC: 4.4 MMOL/L (ref 3.5–5)

## 2018-11-23 ENCOUNTER — HOSPITAL ENCOUNTER (OUTPATIENT)
Dept: CARDIOLOGY | Facility: CLINIC | Age: 58
Discharge: HOME | End: 2018-11-23
Attending: INTERNAL MEDICINE

## 2018-11-23 ENCOUNTER — COMMUNICATION - HEALTHEAST (OUTPATIENT)
Dept: ANTICOAGULATION | Facility: CLINIC | Age: 58
End: 2018-11-23

## 2018-11-23 DIAGNOSIS — I48.20 CHRONIC ATRIAL FIBRILLATION (H): ICD-10-CM

## 2018-11-23 DIAGNOSIS — I26.99 PULMONARY EMBOLISM (H): ICD-10-CM

## 2018-11-23 DIAGNOSIS — Z86.718 HISTORY OF DVT (DEEP VEIN THROMBOSIS): ICD-10-CM

## 2018-11-23 ASSESSMENT — MIFFLIN-ST. JEOR
SCORE: 1677.62
SCORE: 1677.62

## 2018-11-25 LAB
AORTIC VALVE MEAN VELOCITY: 58.1 CM/S
ASCENDING AORTA: 3.2 CM
AV DIMENSIONLESS INDEX VTI: 0.9
AV MEAN GRADIENT: 1.3 MMHG
AV PEAK GRADIENT: 2.5 MMHG
AV VALVE AREA: 3.3 CM2
AV VELOCITY RATIO: 0.9
BSA FOR ECHO PROCEDURE: 2.06 M2
CV BLOOD PRESSURE: NORMAL MMHG
CV ECHO HEIGHT: 72 IN
CV ECHO WEIGHT: 184 LBS
DOP CALC AO PEAK VEL: 78.8 CM/S
DOP CALC AO VTI: 12.8 CM
DOP CALC LVOT AREA: 3.46 CM2
DOP CALC LVOT DIAMETER: 2.1 CM
DOP CALC LVOT PEAK VEL: 70.7 CM/S
DOP CALC LVOT STROKE VOLUME: 41.9 CM3
DOP CALCLVOT PEAK VEL VTI: 12.1 CM
ECHO EJECTION FRACTION ESTIMATED: 55 %
FRACTIONAL SHORTENING: 27.1 % (ref 28–44)
INTERVENTRICULAR SEPTUM IN END DIASTOLE: 0.8 CM (ref 0.6–1)
IVS/PW RATIO: 1
LA AREA 1: 21.3 CM2
LA AREA 2: 22 CM2
LEFT ATRIUM LENGTH: 5.41 CM
LEFT ATRIUM SIZE: 3.6 CM
LEFT ATRIUM VOLUME INDEX: 35.7 ML/M2
LEFT ATRIUM VOLUME: 73.6 ML
LEFT VENTRICLE MASS INDEX: 61.5 G/M2
LEFT VENTRICULAR INTERNAL DIMENSION IN DIASTOLE: 4.8 CM (ref 4.2–5.8)
LEFT VENTRICULAR INTERNAL DIMENSION IN SYSTOLE: 3.5 CM (ref 2.5–4)
LEFT VENTRICULAR MASS: 126.7 G
LEFT VENTRICULAR OUTFLOW TRACT MEAN GRADIENT: 1 MMHG
LEFT VENTRICULAR OUTFLOW TRACT MEAN VELOCITY: 49.5 CM/S
LEFT VENTRICULAR OUTFLOW TRACT PEAK GRADIENT: 2 MMHG
LEFT VENTRICULAR POSTERIOR WALL IN END DIASTOLE: 0.8 CM (ref 0.6–1)
LV STROKE VOLUME INDEX: 20.3 ML/M2
MV DECELERATION TIME: 203 MS
MV PEAK E VELOCITY: 76.8 CM/S
NUC REST DIASTOLIC VOLUME INDEX: 2944 LBS
NUC REST SYSTOLIC VOLUME INDEX: 72 IN
TRICUSPID REGURGITATION PEAK PRESSURE GRADIENT: 18.7 MMHG
TRICUSPID VALVE ANULAR PLANE SYSTOLIC EXCURSION: 1.8 CM
TRICUSPID VALVE PEAK REGURGITANT VELOCITY: 216 CM/S

## 2018-11-26 ENCOUNTER — COMMUNICATION - HEALTHEAST (OUTPATIENT)
Dept: INTERNAL MEDICINE | Facility: CLINIC | Age: 58
End: 2018-11-26

## 2018-11-29 ENCOUNTER — AMBULATORY - HEALTHEAST (OUTPATIENT)
Dept: CARDIOLOGY | Facility: CLINIC | Age: 58
End: 2018-11-29

## 2018-11-29 DIAGNOSIS — I48.91 A-FIB (H): ICD-10-CM

## 2018-11-29 DIAGNOSIS — I10 HTN (HYPERTENSION): ICD-10-CM

## 2018-11-29 DIAGNOSIS — I48.20 CHRONIC ATRIAL FIBRILLATION (H): ICD-10-CM

## 2018-11-29 RX ORDER — BLOOD PRESSURE TEST KIT-MEDIUM
KIT MISCELLANEOUS
Qty: 1 EACH | Refills: 0 | Status: SHIPPED | OUTPATIENT
Start: 2018-11-29

## 2018-12-26 ENCOUNTER — COMMUNICATION - HEALTHEAST (OUTPATIENT)
Dept: INTERNAL MEDICINE | Facility: CLINIC | Age: 58
End: 2018-12-26

## 2018-12-26 ENCOUNTER — COMMUNICATION - HEALTHEAST (OUTPATIENT)
Dept: ANTICOAGULATION | Facility: CLINIC | Age: 58
End: 2018-12-26

## 2018-12-28 ENCOUNTER — COMMUNICATION - HEALTHEAST (OUTPATIENT)
Dept: ANTICOAGULATION | Facility: CLINIC | Age: 58
End: 2018-12-28

## 2018-12-28 ENCOUNTER — AMBULATORY - HEALTHEAST (OUTPATIENT)
Dept: LAB | Facility: CLINIC | Age: 58
End: 2018-12-28

## 2018-12-28 DIAGNOSIS — I26.99 PULMONARY EMBOLISM (H): ICD-10-CM

## 2018-12-28 DIAGNOSIS — Z86.718 HISTORY OF DVT (DEEP VEIN THROMBOSIS): ICD-10-CM

## 2018-12-28 DIAGNOSIS — I48.20 CHRONIC ATRIAL FIBRILLATION (H): ICD-10-CM

## 2018-12-28 LAB — INR PPP: 1.7 (ref 0.9–1.1)

## 2019-01-08 ENCOUNTER — COMMUNICATION - HEALTHEAST (OUTPATIENT)
Dept: INTERNAL MEDICINE | Facility: CLINIC | Age: 59
End: 2019-01-08

## 2019-01-08 DIAGNOSIS — E78.2 MIXED HYPERLIPIDEMIA: ICD-10-CM

## 2019-01-18 ENCOUNTER — AMBULATORY - HEALTHEAST (OUTPATIENT)
Dept: LAB | Facility: CLINIC | Age: 59
End: 2019-01-18

## 2019-01-18 ENCOUNTER — COMMUNICATION - HEALTHEAST (OUTPATIENT)
Dept: ANTICOAGULATION | Facility: CLINIC | Age: 59
End: 2019-01-18

## 2019-01-18 DIAGNOSIS — I48.20 CHRONIC ATRIAL FIBRILLATION (H): ICD-10-CM

## 2019-01-18 DIAGNOSIS — Z86.718 HISTORY OF DVT (DEEP VEIN THROMBOSIS): ICD-10-CM

## 2019-01-18 DIAGNOSIS — I26.99 PULMONARY EMBOLISM (H): ICD-10-CM

## 2019-01-18 LAB — INR PPP: 2.1 (ref 0.9–1.1)

## 2019-01-30 ENCOUNTER — COMMUNICATION - HEALTHEAST (OUTPATIENT)
Dept: CARDIOLOGY | Facility: CLINIC | Age: 59
End: 2019-01-30

## 2019-01-30 DIAGNOSIS — I48.19 PERSISTENT ATRIAL FIBRILLATION (H): ICD-10-CM

## 2019-02-03 ENCOUNTER — COMMUNICATION - HEALTHEAST (OUTPATIENT)
Dept: INTERNAL MEDICINE | Facility: CLINIC | Age: 59
End: 2019-02-03

## 2019-02-03 DIAGNOSIS — Z86.718 HISTORY OF DVT (DEEP VEIN THROMBOSIS): ICD-10-CM

## 2019-02-03 DIAGNOSIS — Z79.01 LONG TERM CURRENT USE OF ANTICOAGULANT THERAPY: ICD-10-CM

## 2019-02-03 DIAGNOSIS — Z86.718 PERSONAL HISTORY OF VENOUS THROMBOSIS AND EMBOLISM: ICD-10-CM

## 2019-02-03 DIAGNOSIS — I48.91 ATRIAL FIBRILLATION (H): ICD-10-CM

## 2019-02-05 ENCOUNTER — COMMUNICATION - HEALTHEAST (OUTPATIENT)
Dept: INTERNAL MEDICINE | Facility: CLINIC | Age: 59
End: 2019-02-05

## 2019-02-08 ENCOUNTER — COMMUNICATION - HEALTHEAST (OUTPATIENT)
Dept: ANTICOAGULATION | Facility: CLINIC | Age: 59
End: 2019-02-08

## 2019-02-15 ENCOUNTER — COMMUNICATION - HEALTHEAST (OUTPATIENT)
Dept: INTERNAL MEDICINE | Facility: CLINIC | Age: 59
End: 2019-02-15

## 2019-02-15 ENCOUNTER — AMBULATORY - HEALTHEAST (OUTPATIENT)
Dept: LAB | Facility: CLINIC | Age: 59
End: 2019-02-15

## 2019-02-15 ENCOUNTER — COMMUNICATION - HEALTHEAST (OUTPATIENT)
Dept: ANTICOAGULATION | Facility: CLINIC | Age: 59
End: 2019-02-15

## 2019-02-15 DIAGNOSIS — E11.9 TYPE 2 DIABETES MELLITUS (H): ICD-10-CM

## 2019-02-15 DIAGNOSIS — Z86.718 HISTORY OF DVT (DEEP VEIN THROMBOSIS): ICD-10-CM

## 2019-02-15 DIAGNOSIS — I48.91 ATRIAL FIBRILLATION (H): ICD-10-CM

## 2019-02-15 DIAGNOSIS — Z79.01 LONG TERM CURRENT USE OF ANTICOAGULANT THERAPY: ICD-10-CM

## 2019-02-15 DIAGNOSIS — I26.99 PULMONARY EMBOLISM (H): ICD-10-CM

## 2019-02-15 DIAGNOSIS — I48.19 PERSISTENT ATRIAL FIBRILLATION (H): ICD-10-CM

## 2019-02-15 DIAGNOSIS — I48.20 CHRONIC ATRIAL FIBRILLATION (H): ICD-10-CM

## 2019-02-15 DIAGNOSIS — Z86.718 PERSONAL HISTORY OF VENOUS THROMBOSIS AND EMBOLISM: ICD-10-CM

## 2019-02-15 LAB — INR PPP: 3 (ref 0.9–1.1)

## 2019-03-25 ENCOUNTER — COMMUNICATION - HEALTHEAST (OUTPATIENT)
Dept: ANTICOAGULATION | Facility: CLINIC | Age: 59
End: 2019-03-25

## 2019-04-23 ENCOUNTER — COMMUNICATION - HEALTHEAST (OUTPATIENT)
Dept: ANTICOAGULATION | Facility: CLINIC | Age: 59
End: 2019-04-23

## 2019-04-23 ENCOUNTER — AMBULATORY - HEALTHEAST (OUTPATIENT)
Dept: LAB | Facility: CLINIC | Age: 59
End: 2019-04-23

## 2019-04-23 DIAGNOSIS — Z86.718 HISTORY OF DVT (DEEP VEIN THROMBOSIS): ICD-10-CM

## 2019-04-23 DIAGNOSIS — I48.20 CHRONIC ATRIAL FIBRILLATION (H): ICD-10-CM

## 2019-04-23 DIAGNOSIS — I26.99 PULMONARY EMBOLISM (H): ICD-10-CM

## 2019-04-23 LAB — INR PPP: 3.5 (ref 0.9–1.1)

## 2019-04-24 ENCOUNTER — COMMUNICATION - HEALTHEAST (OUTPATIENT)
Dept: ANTICOAGULATION | Facility: CLINIC | Age: 59
End: 2019-04-24

## 2019-04-24 ENCOUNTER — COMMUNICATION - HEALTHEAST (OUTPATIENT)
Dept: INTERNAL MEDICINE | Facility: CLINIC | Age: 59
End: 2019-04-24

## 2019-04-24 DIAGNOSIS — I48.91 A-FIB (H): ICD-10-CM

## 2019-04-24 DIAGNOSIS — E11.9 TYPE 2 DIABETES MELLITUS (H): ICD-10-CM

## 2019-04-24 DIAGNOSIS — I48.19 PERSISTENT ATRIAL FIBRILLATION (H): ICD-10-CM

## 2019-04-24 DIAGNOSIS — E78.2 MIXED HYPERLIPIDEMIA: ICD-10-CM

## 2019-05-02 ENCOUNTER — OFFICE VISIT - HEALTHEAST (OUTPATIENT)
Dept: INTERNAL MEDICINE | Facility: CLINIC | Age: 59
End: 2019-05-02

## 2019-05-02 ENCOUNTER — COMMUNICATION - HEALTHEAST (OUTPATIENT)
Dept: ANTICOAGULATION | Facility: CLINIC | Age: 59
End: 2019-05-02

## 2019-05-02 DIAGNOSIS — I27.82 OTHER CHRONIC PULMONARY EMBOLISM WITHOUT ACUTE COR PULMONALE (H): ICD-10-CM

## 2019-05-02 DIAGNOSIS — F17.200 TOBACCO USE DISORDER: ICD-10-CM

## 2019-05-02 DIAGNOSIS — R73.03 PREDIABETES: ICD-10-CM

## 2019-05-02 DIAGNOSIS — I26.99 PULMONARY EMBOLISM (H): ICD-10-CM

## 2019-05-02 DIAGNOSIS — Z86.718 HISTORY OF DVT (DEEP VEIN THROMBOSIS): ICD-10-CM

## 2019-05-02 DIAGNOSIS — I48.20 CHRONIC ATRIAL FIBRILLATION (H): ICD-10-CM

## 2019-05-02 DIAGNOSIS — E78.2 MIXED HYPERLIPIDEMIA: ICD-10-CM

## 2019-05-02 LAB — INR PPP: 1.8 (ref 0.9–1.1)

## 2019-05-02 ASSESSMENT — MIFFLIN-ST. JEOR: SCORE: 1623.19

## 2019-05-17 ENCOUNTER — COMMUNICATION - HEALTHEAST (OUTPATIENT)
Dept: ANTICOAGULATION | Facility: CLINIC | Age: 59
End: 2019-05-17

## 2019-05-21 ENCOUNTER — AMBULATORY - HEALTHEAST (OUTPATIENT)
Dept: LAB | Facility: CLINIC | Age: 59
End: 2019-05-21

## 2019-05-21 ENCOUNTER — COMMUNICATION - HEALTHEAST (OUTPATIENT)
Dept: ANTICOAGULATION | Facility: CLINIC | Age: 59
End: 2019-05-21

## 2019-05-21 DIAGNOSIS — Z86.718 HISTORY OF DVT (DEEP VEIN THROMBOSIS): ICD-10-CM

## 2019-05-21 DIAGNOSIS — I48.20 CHRONIC ATRIAL FIBRILLATION (H): ICD-10-CM

## 2019-05-21 DIAGNOSIS — I26.99 PULMONARY EMBOLISM (H): ICD-10-CM

## 2019-05-21 LAB — INR PPP: 4.3 (ref 0.9–1.1)

## 2019-05-28 ENCOUNTER — AMBULATORY - HEALTHEAST (OUTPATIENT)
Dept: LAB | Facility: CLINIC | Age: 59
End: 2019-05-28

## 2019-05-28 ENCOUNTER — COMMUNICATION - HEALTHEAST (OUTPATIENT)
Dept: ANTICOAGULATION | Facility: CLINIC | Age: 59
End: 2019-05-28

## 2019-05-28 DIAGNOSIS — I48.20 CHRONIC ATRIAL FIBRILLATION (H): ICD-10-CM

## 2019-05-28 DIAGNOSIS — Z86.718 HISTORY OF DVT (DEEP VEIN THROMBOSIS): ICD-10-CM

## 2019-05-28 DIAGNOSIS — I26.99 PULMONARY EMBOLISM (H): ICD-10-CM

## 2019-05-28 LAB — INR PPP: 3 (ref 0.9–1.1)

## 2019-06-18 ENCOUNTER — COMMUNICATION - HEALTHEAST (OUTPATIENT)
Dept: ANTICOAGULATION | Facility: CLINIC | Age: 59
End: 2019-06-18

## 2019-07-01 ENCOUNTER — COMMUNICATION - HEALTHEAST (OUTPATIENT)
Dept: INTERNAL MEDICINE | Facility: CLINIC | Age: 59
End: 2019-07-01

## 2019-07-01 DIAGNOSIS — Z79.01 LONG TERM CURRENT USE OF ANTICOAGULANT THERAPY: ICD-10-CM

## 2019-07-01 DIAGNOSIS — I48.91 ATRIAL FIBRILLATION (H): ICD-10-CM

## 2019-07-01 DIAGNOSIS — Z86.718 HISTORY OF DVT (DEEP VEIN THROMBOSIS): ICD-10-CM

## 2019-07-01 DIAGNOSIS — Z86.718 PERSONAL HISTORY OF VENOUS THROMBOSIS AND EMBOLISM: ICD-10-CM

## 2019-07-02 ENCOUNTER — AMBULATORY - HEALTHEAST (OUTPATIENT)
Dept: LAB | Facility: CLINIC | Age: 59
End: 2019-07-02

## 2019-07-02 ENCOUNTER — COMMUNICATION - HEALTHEAST (OUTPATIENT)
Dept: ANTICOAGULATION | Facility: CLINIC | Age: 59
End: 2019-07-02

## 2019-07-02 DIAGNOSIS — Z86.718 HISTORY OF DVT (DEEP VEIN THROMBOSIS): ICD-10-CM

## 2019-07-02 DIAGNOSIS — I48.20 CHRONIC ATRIAL FIBRILLATION (H): ICD-10-CM

## 2019-07-02 DIAGNOSIS — I26.99 PULMONARY EMBOLISM (H): ICD-10-CM

## 2019-07-02 LAB — INR PPP: 2.8 (ref 0.9–1.1)

## 2019-08-06 ENCOUNTER — COMMUNICATION - HEALTHEAST (OUTPATIENT)
Dept: ANTICOAGULATION | Facility: CLINIC | Age: 59
End: 2019-08-06

## 2019-08-13 ENCOUNTER — COMMUNICATION - HEALTHEAST (OUTPATIENT)
Dept: ANTICOAGULATION | Facility: CLINIC | Age: 59
End: 2019-08-13

## 2019-08-13 ENCOUNTER — AMBULATORY - HEALTHEAST (OUTPATIENT)
Dept: LAB | Facility: CLINIC | Age: 59
End: 2019-08-13

## 2019-08-13 DIAGNOSIS — I48.20 CHRONIC ATRIAL FIBRILLATION (H): ICD-10-CM

## 2019-08-13 DIAGNOSIS — Z86.718 HISTORY OF DVT (DEEP VEIN THROMBOSIS): ICD-10-CM

## 2019-08-13 DIAGNOSIS — I26.99 PULMONARY EMBOLISM (H): ICD-10-CM

## 2019-08-13 LAB — INR PPP: 4.9 (ref 0.9–1.1)

## 2019-08-26 ENCOUNTER — COMMUNICATION - HEALTHEAST (OUTPATIENT)
Dept: ANTICOAGULATION | Facility: CLINIC | Age: 59
End: 2019-08-26

## 2019-10-11 ENCOUNTER — COMMUNICATION - HEALTHEAST (OUTPATIENT)
Dept: ANTICOAGULATION | Facility: CLINIC | Age: 59
End: 2019-10-11

## 2019-10-16 ENCOUNTER — AMBULATORY - HEALTHEAST (OUTPATIENT)
Dept: LAB | Facility: CLINIC | Age: 59
End: 2019-10-16

## 2019-10-16 ENCOUNTER — COMMUNICATION - HEALTHEAST (OUTPATIENT)
Dept: ANTICOAGULATION | Facility: CLINIC | Age: 59
End: 2019-10-16

## 2019-10-16 DIAGNOSIS — Z86.718 HISTORY OF DVT (DEEP VEIN THROMBOSIS): ICD-10-CM

## 2019-10-16 DIAGNOSIS — I26.99 PULMONARY EMBOLISM (H): ICD-10-CM

## 2019-10-16 DIAGNOSIS — I48.20 CHRONIC ATRIAL FIBRILLATION (H): ICD-10-CM

## 2019-10-16 LAB — INR PPP: 2 (ref 0.9–1.1)

## 2019-11-01 ENCOUNTER — COMMUNICATION - HEALTHEAST (OUTPATIENT)
Dept: INTERNAL MEDICINE | Facility: CLINIC | Age: 59
End: 2019-11-01

## 2019-11-01 DIAGNOSIS — E11.9 TYPE 2 DIABETES MELLITUS (H): ICD-10-CM

## 2019-11-01 DIAGNOSIS — I48.91 A-FIB (H): ICD-10-CM

## 2019-11-01 DIAGNOSIS — I48.19 PERSISTENT ATRIAL FIBRILLATION (H): ICD-10-CM

## 2019-11-04 ENCOUNTER — COMMUNICATION - HEALTHEAST (OUTPATIENT)
Dept: ANTICOAGULATION | Facility: CLINIC | Age: 59
End: 2019-11-04

## 2019-11-04 DIAGNOSIS — I48.20 CHRONIC ATRIAL FIBRILLATION (H): ICD-10-CM

## 2019-11-04 DIAGNOSIS — Z86.718 HISTORY OF DVT (DEEP VEIN THROMBOSIS): ICD-10-CM

## 2019-11-06 ENCOUNTER — COMMUNICATION - HEALTHEAST (OUTPATIENT)
Dept: ANTICOAGULATION | Facility: CLINIC | Age: 59
End: 2019-11-06

## 2019-11-11 ENCOUNTER — OFFICE VISIT - HEALTHEAST (OUTPATIENT)
Dept: INTERNAL MEDICINE | Facility: CLINIC | Age: 59
End: 2019-11-11

## 2019-11-11 ENCOUNTER — COMMUNICATION - HEALTHEAST (OUTPATIENT)
Dept: ANTICOAGULATION | Facility: CLINIC | Age: 59
End: 2019-11-11

## 2019-11-11 DIAGNOSIS — S72.001D CLOSED FRACTURE OF RIGHT HIP WITH ROUTINE HEALING, SUBSEQUENT ENCOUNTER: ICD-10-CM

## 2019-11-11 DIAGNOSIS — M87.00 ASEPTIC NECROSIS OF BONE (H): ICD-10-CM

## 2019-11-11 DIAGNOSIS — I10 ESSENTIAL HYPERTENSION, BENIGN: ICD-10-CM

## 2019-11-11 DIAGNOSIS — Z72.0 TOBACCO ABUSE: ICD-10-CM

## 2019-11-11 DIAGNOSIS — Z86.718 HISTORY OF DVT (DEEP VEIN THROMBOSIS): ICD-10-CM

## 2019-11-11 DIAGNOSIS — E78.2 MIXED HYPERLIPIDEMIA: ICD-10-CM

## 2019-11-11 DIAGNOSIS — I48.11 LONGSTANDING PERSISTENT ATRIAL FIBRILLATION (H): ICD-10-CM

## 2019-11-11 LAB — INR PPP: 2.4 (ref 0.9–1.1)

## 2019-11-11 ASSESSMENT — MIFFLIN-ST. JEOR: SCORE: 1618.65

## 2019-11-12 ENCOUNTER — COMMUNICATION - HEALTHEAST (OUTPATIENT)
Dept: NURSING | Facility: CLINIC | Age: 59
End: 2019-11-12

## 2019-11-13 ENCOUNTER — COMMUNICATION - HEALTHEAST (OUTPATIENT)
Dept: NURSING | Facility: CLINIC | Age: 59
End: 2019-11-13

## 2019-11-13 ENCOUNTER — RECORDS - HEALTHEAST (OUTPATIENT)
Dept: ADMINISTRATIVE | Facility: OTHER | Age: 59
End: 2019-11-13

## 2019-12-16 ENCOUNTER — COMMUNICATION - HEALTHEAST (OUTPATIENT)
Dept: ANTICOAGULATION | Facility: CLINIC | Age: 59
End: 2019-12-16

## 2019-12-18 ENCOUNTER — AMBULATORY - HEALTHEAST (OUTPATIENT)
Dept: LAB | Facility: CLINIC | Age: 59
End: 2019-12-18

## 2019-12-18 ENCOUNTER — COMMUNICATION - HEALTHEAST (OUTPATIENT)
Dept: ANTICOAGULATION | Facility: CLINIC | Age: 59
End: 2019-12-18

## 2019-12-18 DIAGNOSIS — Z86.718 HISTORY OF DVT (DEEP VEIN THROMBOSIS): ICD-10-CM

## 2019-12-18 LAB — INR PPP: 3.2 (ref 0.9–1.1)

## 2020-01-07 ENCOUNTER — OFFICE VISIT - HEALTHEAST (OUTPATIENT)
Dept: INTERNAL MEDICINE | Facility: CLINIC | Age: 60
End: 2020-01-07

## 2020-01-07 ENCOUNTER — COMMUNICATION - HEALTHEAST (OUTPATIENT)
Dept: ANTICOAGULATION | Facility: CLINIC | Age: 60
End: 2020-01-07

## 2020-01-07 DIAGNOSIS — M87.00 ASEPTIC NECROSIS OF BONE (H): ICD-10-CM

## 2020-01-07 DIAGNOSIS — E78.2 MIXED HYPERLIPIDEMIA: ICD-10-CM

## 2020-01-07 DIAGNOSIS — Z12.11 SCREEN FOR COLON CANCER: ICD-10-CM

## 2020-01-07 DIAGNOSIS — M80.00XD AGE-RELATED OSTEOPOROSIS WITH CURRENT PATHOLOGICAL FRACTURE WITH ROUTINE HEALING: ICD-10-CM

## 2020-01-07 DIAGNOSIS — Z00.00 ROUTINE GENERAL MEDICAL EXAMINATION AT A HEALTH CARE FACILITY: ICD-10-CM

## 2020-01-07 DIAGNOSIS — M15.9 GENERALIZED OSTEOARTHRITIS: ICD-10-CM

## 2020-01-07 DIAGNOSIS — Z86.718 HISTORY OF DVT (DEEP VEIN THROMBOSIS): ICD-10-CM

## 2020-01-07 DIAGNOSIS — I87.2 VENOUS (PERIPHERAL) INSUFFICIENCY: ICD-10-CM

## 2020-01-07 DIAGNOSIS — D75.89 MACROCYTOSIS: ICD-10-CM

## 2020-01-07 DIAGNOSIS — I10 ESSENTIAL HYPERTENSION, BENIGN: ICD-10-CM

## 2020-01-07 DIAGNOSIS — R42 DIZZINESS: ICD-10-CM

## 2020-01-07 DIAGNOSIS — I48.11 LONGSTANDING PERSISTENT ATRIAL FIBRILLATION (H): ICD-10-CM

## 2020-01-07 DIAGNOSIS — Z72.0 TOBACCO ABUSE: ICD-10-CM

## 2020-01-07 DIAGNOSIS — R73.03 PREDIABETES: ICD-10-CM

## 2020-01-07 DIAGNOSIS — S72.001D CLOSED FRACTURE OF RIGHT HIP WITH ROUTINE HEALING, SUBSEQUENT ENCOUNTER: ICD-10-CM

## 2020-01-07 LAB — INR PPP: 2.5 (ref 0.9–1.1)

## 2020-01-07 ASSESSMENT — MIFFLIN-ST. JEOR: SCORE: 1659.47

## 2020-01-08 ENCOUNTER — AMBULATORY - HEALTHEAST (OUTPATIENT)
Dept: INTERNAL MEDICINE | Facility: CLINIC | Age: 60
End: 2020-01-08

## 2020-01-08 DIAGNOSIS — D75.89 MACROCYTOSIS WITHOUT ANEMIA: ICD-10-CM

## 2020-01-08 DIAGNOSIS — D69.6 THROMBOCYTOPENIA (H): ICD-10-CM

## 2020-01-08 LAB
ALBUMIN SERPL-MCNC: 3.9 G/DL (ref 3.5–5)
ALBUMIN UR-MCNC: NEGATIVE MG/DL
ALP SERPL-CCNC: 61 U/L (ref 45–120)
ALT SERPL W P-5'-P-CCNC: 16 U/L (ref 0–45)
ANION GAP SERPL CALCULATED.3IONS-SCNC: 10 MMOL/L (ref 5–18)
APPEARANCE UR: CLEAR
AST SERPL W P-5'-P-CCNC: 24 U/L (ref 0–40)
BILIRUB SERPL-MCNC: 1.4 MG/DL (ref 0–1)
BILIRUB UR QL STRIP: ABNORMAL
BUN SERPL-MCNC: 16 MG/DL (ref 8–22)
CALCIUM SERPL-MCNC: 9.9 MG/DL (ref 8.5–10.5)
CHLORIDE BLD-SCNC: 100 MMOL/L (ref 98–107)
CHOLEST SERPL-MCNC: 187 MG/DL
CO2 SERPL-SCNC: 26 MMOL/L (ref 22–31)
COLOR UR AUTO: YELLOW
CREAT SERPL-MCNC: 0.84 MG/DL (ref 0.7–1.3)
ERYTHROCYTE [DISTWIDTH] IN BLOOD BY AUTOMATED COUNT: 12.4 % (ref 11–14.5)
FASTING STATUS PATIENT QL REPORTED: YES
GFR SERPL CREATININE-BSD FRML MDRD: >60 ML/MIN/1.73M2
GLUCOSE BLD-MCNC: 115 MG/DL (ref 70–125)
GLUCOSE UR STRIP-MCNC: NEGATIVE MG/DL
HBA1C MFR BLD: 5.9 % (ref 3.5–6)
HCT VFR BLD AUTO: 45.2 % (ref 40–54)
HDLC SERPL-MCNC: 67 MG/DL
HGB BLD-MCNC: 15.3 G/DL (ref 14–18)
HGB UR QL STRIP: NEGATIVE
KETONES UR STRIP-MCNC: ABNORMAL MG/DL
LDLC SERPL CALC-MCNC: 107 MG/DL
LEUKOCYTE ESTERASE UR QL STRIP: NEGATIVE
MCH RBC QN AUTO: 34.5 PG (ref 27–34)
MCHC RBC AUTO-ENTMCNC: 33.9 G/DL (ref 32–36)
MCV RBC AUTO: 101 FL (ref 80–100)
NITRATE UR QL: NEGATIVE
PH UR STRIP: 5.5 [PH] (ref 5–8)
PLATELET # BLD AUTO: 98 THOU/UL (ref 140–440)
PMV BLD AUTO: 10.1 FL (ref 7–10)
POTASSIUM BLD-SCNC: 5.6 MMOL/L (ref 3.5–5)
PROT SERPL-MCNC: 7.8 G/DL (ref 6–8)
RBC # BLD AUTO: 4.45 MILL/UL (ref 4.4–6.2)
SODIUM SERPL-SCNC: 136 MMOL/L (ref 136–145)
SP GR UR STRIP: 1.02 (ref 1–1.03)
TRIGL SERPL-MCNC: 64 MG/DL
UROBILINOGEN UR STRIP-ACNC: ABNORMAL
WBC: 6.1 THOU/UL (ref 4–11)

## 2020-01-09 ENCOUNTER — COMMUNICATION - HEALTHEAST (OUTPATIENT)
Dept: INTERNAL MEDICINE | Facility: CLINIC | Age: 60
End: 2020-01-09

## 2020-01-15 ENCOUNTER — HOSPITAL ENCOUNTER (OUTPATIENT)
Dept: ULTRASOUND IMAGING | Facility: CLINIC | Age: 60
Discharge: HOME | End: 2020-01-15
Attending: INTERNAL MEDICINE

## 2020-01-15 ENCOUNTER — HOSPITAL ENCOUNTER (OUTPATIENT)
Dept: MRI IMAGING | Facility: CLINIC | Age: 60
Discharge: HOME | End: 2020-01-15
Attending: INTERNAL MEDICINE

## 2020-01-15 DIAGNOSIS — D69.6 THROMBOCYTOPENIA (H): ICD-10-CM

## 2020-01-15 DIAGNOSIS — R42 DIZZINESS: ICD-10-CM

## 2020-01-16 ENCOUNTER — COMMUNICATION - HEALTHEAST (OUTPATIENT)
Dept: INTERNAL MEDICINE | Facility: CLINIC | Age: 60
End: 2020-01-16

## 2020-01-22 ENCOUNTER — COMMUNICATION - HEALTHEAST (OUTPATIENT)
Dept: INTERNAL MEDICINE | Facility: CLINIC | Age: 60
End: 2020-01-22

## 2020-01-22 DIAGNOSIS — Z79.01 LONG TERM CURRENT USE OF ANTICOAGULANT THERAPY: ICD-10-CM

## 2020-01-22 DIAGNOSIS — Z86.718 HISTORY OF DVT (DEEP VEIN THROMBOSIS): ICD-10-CM

## 2020-01-22 DIAGNOSIS — I48.91 ATRIAL FIBRILLATION (H): ICD-10-CM

## 2020-01-22 DIAGNOSIS — Z86.718 PERSONAL HISTORY OF VENOUS THROMBOSIS AND EMBOLISM: ICD-10-CM

## 2020-01-24 ENCOUNTER — COMMUNICATION - HEALTHEAST (OUTPATIENT)
Dept: INTERNAL MEDICINE | Facility: CLINIC | Age: 60
End: 2020-01-24

## 2020-01-24 DIAGNOSIS — I48.19 PERSISTENT ATRIAL FIBRILLATION (H): ICD-10-CM

## 2020-02-04 ENCOUNTER — COMMUNICATION - HEALTHEAST (OUTPATIENT)
Dept: ANTICOAGULATION | Facility: CLINIC | Age: 60
End: 2020-02-04

## 2020-02-12 ENCOUNTER — OFFICE VISIT - HEALTHEAST (OUTPATIENT)
Dept: INTERNAL MEDICINE | Facility: CLINIC | Age: 60
End: 2020-02-12

## 2020-02-12 ENCOUNTER — COMMUNICATION - HEALTHEAST (OUTPATIENT)
Dept: ANTICOAGULATION | Facility: CLINIC | Age: 60
End: 2020-02-12

## 2020-02-12 DIAGNOSIS — E78.2 MIXED HYPERLIPIDEMIA: ICD-10-CM

## 2020-02-12 DIAGNOSIS — Z86.718 HISTORY OF DVT (DEEP VEIN THROMBOSIS): ICD-10-CM

## 2020-02-12 DIAGNOSIS — I10 ESSENTIAL HYPERTENSION, BENIGN: ICD-10-CM

## 2020-02-12 DIAGNOSIS — R26.81 UNSTEADY GAIT: ICD-10-CM

## 2020-02-12 DIAGNOSIS — R42 DIZZINESS: ICD-10-CM

## 2020-02-12 DIAGNOSIS — Z72.0 TOBACCO ABUSE: ICD-10-CM

## 2020-02-12 DIAGNOSIS — D75.89 MACROCYTOSIS: ICD-10-CM

## 2020-02-12 DIAGNOSIS — D69.6 THROMBOCYTOPENIA (H): ICD-10-CM

## 2020-02-12 DIAGNOSIS — R73.03 PREDIABETES: ICD-10-CM

## 2020-02-12 DIAGNOSIS — I48.91 ATRIAL FIBRILLATION WITH RAPID VENTRICULAR RESPONSE (H): ICD-10-CM

## 2020-02-12 LAB
ANION GAP SERPL CALCULATED.3IONS-SCNC: 10 MMOL/L (ref 5–18)
BASOPHILS # BLD AUTO: 0.1 THOU/UL (ref 0–0.2)
BASOPHILS NFR BLD AUTO: 1 % (ref 0–2)
BUN SERPL-MCNC: 20 MG/DL (ref 8–22)
CALCIUM SERPL-MCNC: 9.6 MG/DL (ref 8.5–10.5)
CHLORIDE BLD-SCNC: 100 MMOL/L (ref 98–107)
CO2 SERPL-SCNC: 26 MMOL/L (ref 22–31)
CREAT SERPL-MCNC: 0.82 MG/DL (ref 0.7–1.3)
EOSINOPHIL # BLD AUTO: 0.1 THOU/UL (ref 0–0.4)
EOSINOPHIL NFR BLD AUTO: 2 % (ref 0–6)
ERYTHROCYTE [DISTWIDTH] IN BLOOD BY AUTOMATED COUNT: 14.7 % (ref 11–14.5)
GFR SERPL CREATININE-BSD FRML MDRD: >60 ML/MIN/1.73M2
GLUCOSE BLD-MCNC: 93 MG/DL (ref 70–125)
HCT VFR BLD AUTO: 44.6 % (ref 40–54)
HGB BLD-MCNC: 15.1 G/DL (ref 14–18)
INR PPP: 2.4 (ref 0.9–1.1)
LYMPHOCYTES # BLD AUTO: 1.1 THOU/UL (ref 0.8–4.4)
LYMPHOCYTES NFR BLD AUTO: 22 % (ref 20–40)
MCH RBC QN AUTO: 35 PG (ref 27–34)
MCHC RBC AUTO-ENTMCNC: 33.9 G/DL (ref 32–36)
MCV RBC AUTO: 104 FL (ref 80–100)
MONOCYTES # BLD AUTO: 0.5 THOU/UL (ref 0–0.9)
MONOCYTES NFR BLD AUTO: 10 % (ref 2–10)
NEUTROPHILS # BLD AUTO: 3.1 THOU/UL (ref 2–7.7)
NEUTROPHILS NFR BLD AUTO: 64 % (ref 50–70)
PLATELET # BLD AUTO: 112 THOU/UL (ref 140–440)
PMV BLD AUTO: 11.9 FL (ref 8.5–12.5)
POTASSIUM BLD-SCNC: 4.7 MMOL/L (ref 3.5–5)
RBC # BLD AUTO: 4.31 MILL/UL (ref 4.4–6.2)
SODIUM SERPL-SCNC: 136 MMOL/L (ref 136–145)
WBC: 4.9 THOU/UL (ref 4–11)

## 2020-02-12 ASSESSMENT — MIFFLIN-ST. JEOR: SCORE: 1668.55

## 2020-02-13 ENCOUNTER — COMMUNICATION - HEALTHEAST (OUTPATIENT)
Dept: INTERNAL MEDICINE | Facility: CLINIC | Age: 60
End: 2020-02-13

## 2020-02-13 LAB
ATRIAL RATE - MUSE: 78 BPM
BASOPHILS # BLD AUTO: 0.1 THOU/UL (ref 0–0.2)
BASOPHILS NFR BLD AUTO: 1 % (ref 0–2)
DIASTOLIC BLOOD PRESSURE - MUSE: NORMAL
EOSINOPHIL # BLD AUTO: 0.1 THOU/UL (ref 0–0.4)
EOSINOPHIL NFR BLD AUTO: 2 % (ref 0–6)
ERYTHROCYTE [DISTWIDTH] IN BLOOD BY AUTOMATED COUNT: 14.6 % (ref 11–14.5)
HCT VFR BLD AUTO: 44.8 % (ref 40–54)
HGB BLD-MCNC: 15.1 G/DL (ref 14–18)
INTERPRETATION ECG - MUSE: NORMAL
LYMPHOCYTES # BLD AUTO: 1.1 THOU/UL (ref 0.8–4.4)
LYMPHOCYTES NFR BLD AUTO: 22 % (ref 20–40)
MCH RBC QN AUTO: 34.9 PG (ref 27–34)
MCHC RBC AUTO-ENTMCNC: 33.7 G/DL (ref 32–36)
MCV RBC AUTO: 104 FL (ref 80–100)
MONOCYTES # BLD AUTO: 0.5 THOU/UL (ref 0–0.9)
MONOCYTES NFR BLD AUTO: 10 % (ref 2–10)
NEUTROPHILS # BLD AUTO: 3.1 THOU/UL (ref 2–7.7)
NEUTROPHILS NFR BLD AUTO: 64 % (ref 50–70)
P AXIS - MUSE: NORMAL
PATH REPORT.MICROSCOPIC SPEC OTHER STN: ABNORMAL
PLATELET # BLD AUTO: 120 THOU/UL (ref 140–440)
PMV BLD AUTO: 11.7 FL (ref 8.5–12.5)
PR INTERVAL - MUSE: NORMAL
QRS DURATION - MUSE: 90 MS
QT - MUSE: 400 MS
QTC - MUSE: 450 MS
R AXIS - MUSE: -60 DEGREES
RBC # BLD AUTO: 4.33 MILL/UL (ref 4.4–6.2)
SYSTOLIC BLOOD PRESSURE - MUSE: NORMAL
T AXIS - MUSE: 35 DEGREES
VENTRICULAR RATE- MUSE: 76 BPM
WBC: 4.8 THOU/UL (ref 4–11)

## 2020-02-14 LAB
LAB AP CHARGES (HE HISTORICAL CONVERSION): NORMAL
PATH REPORT.COMMENTS IMP SPEC: NORMAL
PATH REPORT.COMMENTS IMP SPEC: NORMAL
PATH REPORT.FINAL DX SPEC: NORMAL
PATH REPORT.RELEVANT HX SPEC: NORMAL

## 2020-02-18 ENCOUNTER — RECORDS - HEALTHEAST (OUTPATIENT)
Dept: ADMINISTRATIVE | Facility: OTHER | Age: 60
End: 2020-02-18

## 2020-02-19 ENCOUNTER — COMMUNICATION - HEALTHEAST (OUTPATIENT)
Dept: INTERNAL MEDICINE | Facility: CLINIC | Age: 60
End: 2020-02-19

## 2020-02-19 ENCOUNTER — AMBULATORY - HEALTHEAST (OUTPATIENT)
Dept: LAB | Facility: CLINIC | Age: 60
End: 2020-02-19

## 2020-02-19 DIAGNOSIS — D75.89 MACROCYTOSIS WITHOUT ANEMIA: ICD-10-CM

## 2020-02-19 DIAGNOSIS — D69.6 THROMBOCYTOPENIA (H): ICD-10-CM

## 2020-02-19 LAB
FOLATE SERPL-MCNC: 6.3 NG/ML
VIT B12 SERPL-MCNC: 393 PG/ML (ref 213–816)

## 2020-02-20 ENCOUNTER — COMMUNICATION - HEALTHEAST (OUTPATIENT)
Dept: INTERNAL MEDICINE | Facility: CLINIC | Age: 60
End: 2020-02-20

## 2020-02-21 ENCOUNTER — COMMUNICATION - HEALTHEAST (OUTPATIENT)
Dept: INTERNAL MEDICINE | Facility: CLINIC | Age: 60
End: 2020-02-21

## 2020-02-21 DIAGNOSIS — E11.9 TYPE 2 DIABETES MELLITUS (H): ICD-10-CM

## 2020-02-25 ENCOUNTER — COMMUNICATION - HEALTHEAST (OUTPATIENT)
Dept: INTERNAL MEDICINE | Facility: CLINIC | Age: 60
End: 2020-02-25

## 2020-03-11 ENCOUNTER — COMMUNICATION - HEALTHEAST (OUTPATIENT)
Dept: ANTICOAGULATION | Facility: CLINIC | Age: 60
End: 2020-03-11

## 2020-03-24 ENCOUNTER — COMMUNICATION - HEALTHEAST (OUTPATIENT)
Dept: ANTICOAGULATION | Facility: CLINIC | Age: 60
End: 2020-03-24

## 2020-03-24 DIAGNOSIS — Z86.718 HISTORY OF DVT (DEEP VEIN THROMBOSIS): ICD-10-CM

## 2020-03-24 LAB — INR PPP: 2.4 (ref 0.9–1.1)

## 2020-03-30 ENCOUNTER — COMMUNICATION - HEALTHEAST (OUTPATIENT)
Dept: INTERNAL MEDICINE | Facility: CLINIC | Age: 60
End: 2020-03-30

## 2020-03-30 DIAGNOSIS — M15.9 GENERALIZED OSTEOARTHRITIS: ICD-10-CM

## 2020-05-12 ENCOUNTER — COMMUNICATION - HEALTHEAST (OUTPATIENT)
Dept: ANTICOAGULATION | Facility: CLINIC | Age: 60
End: 2020-05-12

## 2020-07-06 ENCOUNTER — COMMUNICATION - HEALTHEAST (OUTPATIENT)
Dept: INTERNAL MEDICINE | Facility: CLINIC | Age: 60
End: 2020-07-06

## 2020-07-06 DIAGNOSIS — M15.9 GENERALIZED OSTEOARTHRITIS: ICD-10-CM

## 2020-08-04 ENCOUNTER — COMMUNICATION - HEALTHEAST (OUTPATIENT)
Dept: LAB | Facility: CLINIC | Age: 60
End: 2020-08-04

## 2020-08-04 ENCOUNTER — OFFICE VISIT - HEALTHEAST (OUTPATIENT)
Dept: INTERNAL MEDICINE | Facility: CLINIC | Age: 60
End: 2020-08-04

## 2020-08-04 DIAGNOSIS — Z86.718 HISTORY OF DVT (DEEP VEIN THROMBOSIS): ICD-10-CM

## 2020-08-04 DIAGNOSIS — I27.82 CHRONIC PULMONARY EMBOLISM, UNSPECIFIED PULMONARY EMBOLISM TYPE, UNSPECIFIED WHETHER ACUTE COR PULMONALE PRESENT (H): ICD-10-CM

## 2020-08-04 DIAGNOSIS — R73.03 PREDIABETES: ICD-10-CM

## 2020-08-04 DIAGNOSIS — I87.2 VENOUS (PERIPHERAL) INSUFFICIENCY: ICD-10-CM

## 2020-08-04 DIAGNOSIS — Z72.0 TOBACCO ABUSE: ICD-10-CM

## 2020-08-04 DIAGNOSIS — I10 ESSENTIAL HYPERTENSION, BENIGN: ICD-10-CM

## 2020-08-04 DIAGNOSIS — E78.2 MIXED HYPERLIPIDEMIA: ICD-10-CM

## 2020-08-04 LAB
ALBUMIN SERPL-MCNC: 3.8 G/DL (ref 3.5–5)
ALP SERPL-CCNC: 51 U/L (ref 45–120)
ALT SERPL W P-5'-P-CCNC: 19 U/L (ref 0–45)
ANION GAP SERPL CALCULATED.3IONS-SCNC: 9 MMOL/L (ref 5–18)
AST SERPL W P-5'-P-CCNC: 27 U/L (ref 0–40)
BILIRUB SERPL-MCNC: 0.5 MG/DL (ref 0–1)
BUN SERPL-MCNC: 24 MG/DL (ref 8–22)
CALCIUM SERPL-MCNC: 8.8 MG/DL (ref 8.5–10.5)
CHLORIDE BLD-SCNC: 101 MMOL/L (ref 98–107)
CHOLEST SERPL-MCNC: 167 MG/DL
CO2 SERPL-SCNC: 26 MMOL/L (ref 22–31)
CREAT SERPL-MCNC: 1.53 MG/DL (ref 0.7–1.3)
FASTING STATUS PATIENT QL REPORTED: YES
GFR SERPL CREATININE-BSD FRML MDRD: 47 ML/MIN/1.73M2
GLUCOSE BLD-MCNC: 89 MG/DL (ref 70–125)
HDLC SERPL-MCNC: 61 MG/DL
INR PPP: 5.05 (ref 0.9–1.1)
LDLC SERPL CALC-MCNC: 92 MG/DL
POTASSIUM BLD-SCNC: 4.9 MMOL/L (ref 3.5–5)
PROT SERPL-MCNC: 7.2 G/DL (ref 6–8)
SODIUM SERPL-SCNC: 136 MMOL/L (ref 136–145)
TRIGL SERPL-MCNC: 68 MG/DL

## 2020-08-04 ASSESSMENT — MIFFLIN-ST. JEOR: SCORE: 1673.08

## 2020-08-05 ENCOUNTER — COMMUNICATION - HEALTHEAST (OUTPATIENT)
Dept: INTERNAL MEDICINE | Facility: CLINIC | Age: 60
End: 2020-08-05

## 2020-08-06 ENCOUNTER — COMMUNICATION - HEALTHEAST (OUTPATIENT)
Dept: INTERNAL MEDICINE | Facility: CLINIC | Age: 60
End: 2020-08-06

## 2020-08-06 ENCOUNTER — COMMUNICATION - HEALTHEAST (OUTPATIENT)
Dept: NURSING | Facility: CLINIC | Age: 60
End: 2020-08-06

## 2020-08-06 DIAGNOSIS — N17.9 ACUTE KIDNEY INJURY (H): ICD-10-CM

## 2020-08-10 ENCOUNTER — COMMUNICATION - HEALTHEAST (OUTPATIENT)
Dept: NURSING | Facility: CLINIC | Age: 60
End: 2020-08-10

## 2020-08-10 ASSESSMENT — ACTIVITIES OF DAILY LIVING (ADL): DEPENDENT_IADLS:: INDEPENDENT

## 2020-08-11 ENCOUNTER — COMMUNICATION - HEALTHEAST (OUTPATIENT)
Dept: NURSING | Facility: CLINIC | Age: 60
End: 2020-08-11

## 2020-08-11 ENCOUNTER — COMMUNICATION - HEALTHEAST (OUTPATIENT)
Dept: INTERNAL MEDICINE | Facility: CLINIC | Age: 60
End: 2020-08-11

## 2020-08-11 DIAGNOSIS — Z86.718 PERSONAL HISTORY OF VENOUS THROMBOSIS AND EMBOLISM: ICD-10-CM

## 2020-08-11 DIAGNOSIS — I48.91 ATRIAL FIBRILLATION (H): ICD-10-CM

## 2020-08-11 DIAGNOSIS — Z86.718 HISTORY OF DVT (DEEP VEIN THROMBOSIS): ICD-10-CM

## 2020-08-11 DIAGNOSIS — Z79.01 LONG TERM CURRENT USE OF ANTICOAGULANT THERAPY: ICD-10-CM

## 2020-08-13 ENCOUNTER — COMMUNICATION - HEALTHEAST (OUTPATIENT)
Dept: NURSING | Facility: CLINIC | Age: 60
End: 2020-08-13

## 2020-08-13 ENCOUNTER — COMMUNICATION - HEALTHEAST (OUTPATIENT)
Dept: ANTICOAGULATION | Facility: CLINIC | Age: 60
End: 2020-08-13

## 2020-08-13 ENCOUNTER — AMBULATORY - HEALTHEAST (OUTPATIENT)
Dept: LAB | Facility: CLINIC | Age: 60
End: 2020-08-13

## 2020-08-13 DIAGNOSIS — Z86.718 HISTORY OF DVT (DEEP VEIN THROMBOSIS): ICD-10-CM

## 2020-08-13 LAB — INR PPP: 3.1 (ref 0.9–1.1)

## 2020-08-17 ENCOUNTER — COMMUNICATION - HEALTHEAST (OUTPATIENT)
Dept: NURSING | Facility: CLINIC | Age: 60
End: 2020-08-17

## 2020-08-19 ENCOUNTER — COMMUNICATION - HEALTHEAST (OUTPATIENT)
Dept: ANTICOAGULATION | Facility: CLINIC | Age: 60
End: 2020-08-19

## 2020-08-19 ENCOUNTER — AMBULATORY - HEALTHEAST (OUTPATIENT)
Dept: LAB | Facility: CLINIC | Age: 60
End: 2020-08-19

## 2020-08-19 DIAGNOSIS — Z86.718 HISTORY OF DVT (DEEP VEIN THROMBOSIS): ICD-10-CM

## 2020-08-19 DIAGNOSIS — N17.9 ACUTE KIDNEY INJURY (H): ICD-10-CM

## 2020-08-19 LAB
ANION GAP SERPL CALCULATED.3IONS-SCNC: 11 MMOL/L (ref 5–18)
BUN SERPL-MCNC: 12 MG/DL (ref 8–22)
CALCIUM SERPL-MCNC: 9.3 MG/DL (ref 8.5–10.5)
CHLORIDE BLD-SCNC: 100 MMOL/L (ref 98–107)
CO2 SERPL-SCNC: 26 MMOL/L (ref 22–31)
CREAT SERPL-MCNC: 1 MG/DL (ref 0.7–1.3)
GFR SERPL CREATININE-BSD FRML MDRD: >60 ML/MIN/1.73M2
GLUCOSE BLD-MCNC: 179 MG/DL (ref 70–125)
INR PPP: 2.5 (ref 0.9–1.1)
POTASSIUM BLD-SCNC: 4 MMOL/L (ref 3.5–5)
SODIUM SERPL-SCNC: 137 MMOL/L (ref 136–145)

## 2020-08-20 ENCOUNTER — COMMUNICATION - HEALTHEAST (OUTPATIENT)
Dept: INTERNAL MEDICINE | Facility: CLINIC | Age: 60
End: 2020-08-20

## 2020-08-28 ENCOUNTER — COMMUNICATION - HEALTHEAST (OUTPATIENT)
Dept: NURSING | Facility: CLINIC | Age: 60
End: 2020-08-28

## 2020-09-01 ENCOUNTER — COMMUNICATION - HEALTHEAST (OUTPATIENT)
Dept: NURSING | Facility: CLINIC | Age: 60
End: 2020-09-01

## 2020-09-02 ENCOUNTER — COMMUNICATION - HEALTHEAST (OUTPATIENT)
Dept: ANTICOAGULATION | Facility: CLINIC | Age: 60
End: 2020-09-02

## 2020-09-02 ENCOUNTER — AMBULATORY - HEALTHEAST (OUTPATIENT)
Dept: LAB | Facility: CLINIC | Age: 60
End: 2020-09-02

## 2020-09-02 DIAGNOSIS — Z86.718 HISTORY OF DVT (DEEP VEIN THROMBOSIS): ICD-10-CM

## 2020-09-02 LAB — INR PPP: 2.6 (ref 0.9–1.1)

## 2020-09-08 ENCOUNTER — COMMUNICATION - HEALTHEAST (OUTPATIENT)
Dept: NURSING | Facility: CLINIC | Age: 60
End: 2020-09-08

## 2020-09-09 ENCOUNTER — COMMUNICATION - HEALTHEAST (OUTPATIENT)
Dept: NURSING | Facility: CLINIC | Age: 60
End: 2020-09-09

## 2020-10-07 ENCOUNTER — COMMUNICATION - HEALTHEAST (OUTPATIENT)
Dept: ANTICOAGULATION | Facility: CLINIC | Age: 60
End: 2020-10-07

## 2020-10-08 ENCOUNTER — COMMUNICATION - HEALTHEAST (OUTPATIENT)
Dept: ANTICOAGULATION | Facility: CLINIC | Age: 60
End: 2020-10-08

## 2020-10-08 ENCOUNTER — AMBULATORY - HEALTHEAST (OUTPATIENT)
Dept: LAB | Facility: CLINIC | Age: 60
End: 2020-10-08

## 2020-10-08 DIAGNOSIS — Z86.718 HISTORY OF DVT (DEEP VEIN THROMBOSIS): ICD-10-CM

## 2020-10-08 LAB — INR PPP: 4.3 (ref 0.9–1.1)

## 2020-10-12 ENCOUNTER — COMMUNICATION - HEALTHEAST (OUTPATIENT)
Dept: NURSING | Facility: CLINIC | Age: 60
End: 2020-10-12

## 2020-10-21 ENCOUNTER — AMBULATORY - HEALTHEAST (OUTPATIENT)
Dept: LAB | Facility: CLINIC | Age: 60
End: 2020-10-21

## 2020-10-21 ENCOUNTER — COMMUNICATION - HEALTHEAST (OUTPATIENT)
Dept: LAB | Facility: CLINIC | Age: 60
End: 2020-10-21

## 2020-10-21 DIAGNOSIS — Z86.718 HISTORY OF DVT (DEEP VEIN THROMBOSIS): ICD-10-CM

## 2020-10-21 LAB — INR PPP: 5.57 (ref 0.9–1.1)

## 2020-10-29 ENCOUNTER — COMMUNICATION - HEALTHEAST (OUTPATIENT)
Dept: ANTICOAGULATION | Facility: CLINIC | Age: 60
End: 2020-10-29

## 2020-10-29 ENCOUNTER — AMBULATORY - HEALTHEAST (OUTPATIENT)
Dept: LAB | Facility: CLINIC | Age: 60
End: 2020-10-29

## 2020-10-29 DIAGNOSIS — Z86.718 HISTORY OF DVT (DEEP VEIN THROMBOSIS): ICD-10-CM

## 2020-10-29 LAB — INR PPP: 4.2 (ref 0.9–1.1)

## 2020-11-05 ENCOUNTER — COMMUNICATION - HEALTHEAST (OUTPATIENT)
Dept: ANTICOAGULATION | Facility: CLINIC | Age: 60
End: 2020-11-05

## 2020-11-05 ENCOUNTER — AMBULATORY - HEALTHEAST (OUTPATIENT)
Dept: LAB | Facility: CLINIC | Age: 60
End: 2020-11-05

## 2020-11-05 DIAGNOSIS — Z86.718 HISTORY OF DVT (DEEP VEIN THROMBOSIS): ICD-10-CM

## 2020-11-05 DIAGNOSIS — I26.99 PULMONARY EMBOLISM (H): ICD-10-CM

## 2020-11-05 DIAGNOSIS — I48.20 CHRONIC ATRIAL FIBRILLATION (H): ICD-10-CM

## 2020-11-05 LAB — INR PPP: 3.1 (ref 0.9–1.1)

## 2020-11-19 ENCOUNTER — AMBULATORY - HEALTHEAST (OUTPATIENT)
Dept: LAB | Facility: CLINIC | Age: 60
End: 2020-11-19

## 2020-11-19 ENCOUNTER — COMMUNICATION - HEALTHEAST (OUTPATIENT)
Dept: ANTICOAGULATION | Facility: CLINIC | Age: 60
End: 2020-11-19

## 2020-11-19 DIAGNOSIS — I48.20 CHRONIC ATRIAL FIBRILLATION (H): ICD-10-CM

## 2020-11-19 DIAGNOSIS — I26.99 PULMONARY EMBOLISM (H): ICD-10-CM

## 2020-11-19 DIAGNOSIS — Z86.718 HISTORY OF DVT (DEEP VEIN THROMBOSIS): ICD-10-CM

## 2020-11-19 LAB — INR PPP: 1.8 (ref 0.9–1.1)

## 2020-12-10 ENCOUNTER — COMMUNICATION - HEALTHEAST (OUTPATIENT)
Dept: ANTICOAGULATION | Facility: CLINIC | Age: 60
End: 2020-12-10

## 2020-12-10 ENCOUNTER — AMBULATORY - HEALTHEAST (OUTPATIENT)
Dept: LAB | Facility: CLINIC | Age: 60
End: 2020-12-10

## 2020-12-10 DIAGNOSIS — I26.99 PULMONARY EMBOLISM (H): ICD-10-CM

## 2020-12-10 DIAGNOSIS — Z86.718 HISTORY OF DVT (DEEP VEIN THROMBOSIS): ICD-10-CM

## 2020-12-10 DIAGNOSIS — I48.20 CHRONIC ATRIAL FIBRILLATION (H): ICD-10-CM

## 2020-12-10 LAB — INR PPP: 2.1 (ref 0.9–1.1)

## 2021-01-07 ENCOUNTER — COMMUNICATION - HEALTHEAST (OUTPATIENT)
Dept: ANTICOAGULATION | Facility: CLINIC | Age: 61
End: 2021-01-07

## 2021-01-14 ENCOUNTER — AMBULATORY - HEALTHEAST (OUTPATIENT)
Dept: LAB | Facility: CLINIC | Age: 61
End: 2021-01-14

## 2021-01-14 ENCOUNTER — COMMUNICATION - HEALTHEAST (OUTPATIENT)
Dept: ANTICOAGULATION | Facility: CLINIC | Age: 61
End: 2021-01-14

## 2021-01-14 DIAGNOSIS — I48.20 CHRONIC ATRIAL FIBRILLATION (H): ICD-10-CM

## 2021-01-14 DIAGNOSIS — Z86.718 HISTORY OF DVT (DEEP VEIN THROMBOSIS): ICD-10-CM

## 2021-01-14 DIAGNOSIS — I26.99 PULMONARY EMBOLISM (H): ICD-10-CM

## 2021-01-14 LAB — INR PPP: 1.6 (ref 0.9–1.1)

## 2021-01-24 ENCOUNTER — RECORDS - HEALTHEAST (OUTPATIENT)
Dept: ADMINISTRATIVE | Facility: OTHER | Age: 61
End: 2021-01-24

## 2021-01-29 ENCOUNTER — COMMUNICATION - HEALTHEAST (OUTPATIENT)
Dept: INTERNAL MEDICINE | Facility: CLINIC | Age: 61
End: 2021-01-29

## 2021-01-29 DIAGNOSIS — I48.19 PERSISTENT ATRIAL FIBRILLATION (H): ICD-10-CM

## 2021-02-04 ENCOUNTER — COMMUNICATION - HEALTHEAST (OUTPATIENT)
Dept: INTERNAL MEDICINE | Facility: CLINIC | Age: 61
End: 2021-02-04

## 2021-02-04 DIAGNOSIS — Z79.01 LONG TERM CURRENT USE OF ANTICOAGULANT THERAPY: ICD-10-CM

## 2021-02-04 DIAGNOSIS — E78.2 MIXED HYPERLIPIDEMIA: ICD-10-CM

## 2021-02-04 DIAGNOSIS — I48.91 ATRIAL FIBRILLATION (H): ICD-10-CM

## 2021-02-04 DIAGNOSIS — Z86.718 PERSONAL HISTORY OF VENOUS THROMBOSIS AND EMBOLISM: ICD-10-CM

## 2021-02-04 DIAGNOSIS — Z86.718 HISTORY OF DVT (DEEP VEIN THROMBOSIS): ICD-10-CM

## 2021-02-04 RX ORDER — ROSUVASTATIN CALCIUM 10 MG/1
TABLET, COATED ORAL
Qty: 90 TABLET | Refills: 1 | Status: SHIPPED | OUTPATIENT
Start: 2021-02-04 | End: 2021-08-11

## 2021-02-04 RX ORDER — WARFARIN SODIUM 5 MG/1
TABLET ORAL
Qty: 90 TABLET | Refills: 1 | Status: SHIPPED | OUTPATIENT
Start: 2021-02-04 | End: 2021-12-01

## 2021-02-15 ENCOUNTER — COMMUNICATION - HEALTHEAST (OUTPATIENT)
Dept: INTERNAL MEDICINE | Facility: CLINIC | Age: 61
End: 2021-02-15

## 2021-02-15 DIAGNOSIS — E11.9 TYPE 2 DIABETES MELLITUS (H): ICD-10-CM

## 2021-02-15 DIAGNOSIS — I48.11 LONGSTANDING PERSISTENT ATRIAL FIBRILLATION (H): ICD-10-CM

## 2021-02-15 DIAGNOSIS — I48.19 PERSISTENT ATRIAL FIBRILLATION (H): ICD-10-CM

## 2021-02-15 RX ORDER — DILTIAZEM HYDROCHLORIDE 120 MG/1
120 CAPSULE, COATED, EXTENDED RELEASE ORAL DAILY
Qty: 90 CAPSULE | Refills: 1 | Status: SHIPPED | OUTPATIENT
Start: 2021-02-15 | End: 2021-09-13

## 2021-02-15 RX ORDER — METOPROLOL SUCCINATE 200 MG/1
200 TABLET, EXTENDED RELEASE ORAL DAILY
Qty: 90 TABLET | Refills: 1 | Status: SHIPPED | OUTPATIENT
Start: 2021-02-15 | End: 2022-02-01

## 2021-02-15 RX ORDER — LISINOPRIL 5 MG/1
5 TABLET ORAL DAILY
Qty: 90 TABLET | Refills: 1 | Status: SHIPPED | OUTPATIENT
Start: 2021-02-15 | End: 2021-08-05

## 2021-02-17 ENCOUNTER — OFFICE VISIT - HEALTHEAST (OUTPATIENT)
Dept: INTERNAL MEDICINE | Facility: CLINIC | Age: 61
End: 2021-02-17

## 2021-02-17 DIAGNOSIS — R09.89 DECREASED PEDAL PULSES: ICD-10-CM

## 2021-02-17 DIAGNOSIS — I27.82 CHRONIC PULMONARY EMBOLISM, UNSPECIFIED PULMONARY EMBOLISM TYPE, UNSPECIFIED WHETHER ACUTE COR PULMONALE PRESENT (H): ICD-10-CM

## 2021-02-17 DIAGNOSIS — I48.11 LONGSTANDING PERSISTENT ATRIAL FIBRILLATION (H): ICD-10-CM

## 2021-02-17 DIAGNOSIS — M85.80 OSTEOPENIA, UNSPECIFIED LOCATION: ICD-10-CM

## 2021-02-17 DIAGNOSIS — R41.3 MEMORY LOSS: ICD-10-CM

## 2021-02-17 DIAGNOSIS — F52.8 PSYCHOSEXUAL DYSFUNCTION WITH INHIBITED SEXUAL EXCITEMENT: ICD-10-CM

## 2021-02-17 DIAGNOSIS — D75.89 MACROCYTOSIS: ICD-10-CM

## 2021-02-17 DIAGNOSIS — S42.101D CLOSED FRACTURE OF RIGHT SCAPULA WITH ROUTINE HEALING, UNSPECIFIED PART OF SCAPULA, SUBSEQUENT ENCOUNTER: ICD-10-CM

## 2021-02-17 DIAGNOSIS — D69.6 THROMBOCYTOPENIA (H): ICD-10-CM

## 2021-02-17 DIAGNOSIS — Z72.0 TOBACCO ABUSE: ICD-10-CM

## 2021-02-17 DIAGNOSIS — I10 ESSENTIAL HYPERTENSION, BENIGN: ICD-10-CM

## 2021-02-17 DIAGNOSIS — S42.001D CLOSED NONDISPLACED FRACTURE OF RIGHT CLAVICLE WITH ROUTINE HEALING, UNSPECIFIED PART OF CLAVICLE, SUBSEQUENT ENCOUNTER: ICD-10-CM

## 2021-02-17 DIAGNOSIS — I87.2 VENOUS (PERIPHERAL) INSUFFICIENCY: ICD-10-CM

## 2021-02-17 DIAGNOSIS — E78.2 MIXED HYPERLIPIDEMIA: ICD-10-CM

## 2021-02-17 DIAGNOSIS — R73.03 PREDIABETES: ICD-10-CM

## 2021-02-17 DIAGNOSIS — Z00.00 ROUTINE GENERAL MEDICAL EXAMINATION AT A HEALTH CARE FACILITY: ICD-10-CM

## 2021-02-17 LAB
ALBUMIN UR-MCNC: NEGATIVE MG/DL
APPEARANCE UR: CLEAR
BILIRUB UR QL STRIP: ABNORMAL
COLOR UR AUTO: YELLOW
ERYTHROCYTE [DISTWIDTH] IN BLOOD BY AUTOMATED COUNT: 12.4 % (ref 11–14.5)
GLUCOSE UR STRIP-MCNC: NEGATIVE MG/DL
HBA1C MFR BLD: 5.6 %
HCT VFR BLD AUTO: 46.5 % (ref 40–54)
HGB BLD-MCNC: 15.6 G/DL (ref 14–18)
HGB UR QL STRIP: NEGATIVE
INR PPP: 1.8 (ref 0.9–1.1)
KETONES UR STRIP-MCNC: ABNORMAL MG/DL
LEUKOCYTE ESTERASE UR QL STRIP: NEGATIVE
MCH RBC QN AUTO: 33.7 PG (ref 27–34)
MCHC RBC AUTO-ENTMCNC: 33.5 G/DL (ref 32–36)
MCV RBC AUTO: 100 FL (ref 80–100)
NITRATE UR QL: NEGATIVE
PH UR STRIP: 5.5 [PH] (ref 5–8)
PLATELET # BLD AUTO: 139 THOU/UL (ref 140–440)
PMV BLD AUTO: 11.4 FL (ref 7–10)
RBC # BLD AUTO: 4.63 MILL/UL (ref 4.4–6.2)
SP GR UR STRIP: 1.02 (ref 1–1.03)
UROBILINOGEN UR STRIP-ACNC: ABNORMAL
WBC: 7.1 THOU/UL (ref 4–11)

## 2021-02-17 ASSESSMENT — MIFFLIN-ST. JEOR: SCORE: 1652.22

## 2021-02-18 ENCOUNTER — COMMUNICATION - HEALTHEAST (OUTPATIENT)
Dept: ANTICOAGULATION | Facility: CLINIC | Age: 61
End: 2021-02-18

## 2021-02-18 ENCOUNTER — COMMUNICATION - HEALTHEAST (OUTPATIENT)
Dept: SCHEDULING | Facility: CLINIC | Age: 61
End: 2021-02-18

## 2021-02-18 ENCOUNTER — COMMUNICATION - HEALTHEAST (OUTPATIENT)
Dept: INTERNAL MEDICINE | Facility: CLINIC | Age: 61
End: 2021-02-18

## 2021-02-18 DIAGNOSIS — Z86.718 HISTORY OF DVT (DEEP VEIN THROMBOSIS): ICD-10-CM

## 2021-02-18 DIAGNOSIS — I87.2 VENOUS (PERIPHERAL) INSUFFICIENCY: ICD-10-CM

## 2021-02-18 LAB
ALBUMIN SERPL-MCNC: 4.3 G/DL (ref 3.5–5)
ALP SERPL-CCNC: 61 U/L (ref 45–120)
ALT SERPL W P-5'-P-CCNC: 20 U/L (ref 0–45)
ANION GAP SERPL CALCULATED.3IONS-SCNC: 11 MMOL/L (ref 5–18)
AST SERPL W P-5'-P-CCNC: 22 U/L (ref 0–40)
BILIRUB SERPL-MCNC: 0.7 MG/DL (ref 0–1)
BUN SERPL-MCNC: 31 MG/DL (ref 8–22)
CALCIUM SERPL-MCNC: 10 MG/DL (ref 8.5–10.5)
CHLORIDE BLD-SCNC: 100 MMOL/L (ref 98–107)
CHOLEST SERPL-MCNC: 155 MG/DL
CO2 SERPL-SCNC: 23 MMOL/L (ref 22–31)
CREAT SERPL-MCNC: 1.12 MG/DL (ref 0.7–1.3)
FASTING STATUS PATIENT QL REPORTED: YES
GFR SERPL CREATININE-BSD FRML MDRD: >60 ML/MIN/1.73M2
GLUCOSE BLD-MCNC: 134 MG/DL (ref 70–125)
HDLC SERPL-MCNC: 57 MG/DL
LDLC SERPL CALC-MCNC: 85 MG/DL
POTASSIUM BLD-SCNC: 5.3 MMOL/L (ref 3.5–5)
PROT SERPL-MCNC: 8.1 G/DL (ref 6–8)
PSA SERPL-MCNC: 1.1 NG/ML (ref 0–4.5)
SODIUM SERPL-SCNC: 134 MMOL/L (ref 136–145)
TRIGL SERPL-MCNC: 64 MG/DL

## 2021-02-18 RX ORDER — TRIAMCINOLONE ACETONIDE 1 MG/G
CREAM TOPICAL
Qty: 453.6 G | Refills: 1 | Status: SHIPPED | OUTPATIENT
Start: 2021-02-18 | End: 2021-08-17

## 2021-02-19 ENCOUNTER — COMMUNICATION - HEALTHEAST (OUTPATIENT)
Dept: INTERNAL MEDICINE | Facility: CLINIC | Age: 61
End: 2021-02-19

## 2021-02-19 LAB — HCV AB SERPL QL IA: NEGATIVE

## 2021-02-25 ENCOUNTER — AMBULATORY - HEALTHEAST (OUTPATIENT)
Dept: ANTICOAGULATION | Facility: CLINIC | Age: 61
End: 2021-02-25

## 2021-02-25 DIAGNOSIS — Z86.718 HISTORY OF DVT (DEEP VEIN THROMBOSIS): ICD-10-CM

## 2021-03-05 ENCOUNTER — COMMUNICATION - HEALTHEAST (OUTPATIENT)
Dept: ANTICOAGULATION | Facility: CLINIC | Age: 61
End: 2021-03-05

## 2021-03-09 ENCOUNTER — COMMUNICATION - HEALTHEAST (OUTPATIENT)
Dept: ADMINISTRATIVE | Facility: CLINIC | Age: 61
End: 2021-03-09

## 2021-03-10 ENCOUNTER — COMMUNICATION - HEALTHEAST (OUTPATIENT)
Dept: ADMINISTRATIVE | Facility: CLINIC | Age: 61
End: 2021-03-10

## 2021-03-12 ENCOUNTER — COMMUNICATION - HEALTHEAST (OUTPATIENT)
Dept: ANTICOAGULATION | Facility: CLINIC | Age: 61
End: 2021-03-12

## 2021-03-24 ENCOUNTER — COMMUNICATION - HEALTHEAST (OUTPATIENT)
Dept: ANTICOAGULATION | Facility: CLINIC | Age: 61
End: 2021-03-24

## 2021-03-24 DIAGNOSIS — I27.82 CHRONIC PULMONARY EMBOLISM, UNSPECIFIED PULMONARY EMBOLISM TYPE, UNSPECIFIED WHETHER ACUTE COR PULMONALE PRESENT (H): ICD-10-CM

## 2021-03-24 DIAGNOSIS — Z86.718 HISTORY OF DVT (DEEP VEIN THROMBOSIS): ICD-10-CM

## 2021-03-24 DIAGNOSIS — I48.11 LONGSTANDING PERSISTENT ATRIAL FIBRILLATION (H): ICD-10-CM

## 2021-03-24 DIAGNOSIS — Z79.01 LONG TERM (CURRENT) USE OF ANTICOAGULANTS: ICD-10-CM

## 2021-03-31 ENCOUNTER — RECORDS - HEALTHEAST (OUTPATIENT)
Dept: ADMINISTRATIVE | Facility: OTHER | Age: 61
End: 2021-03-31

## 2021-04-05 ENCOUNTER — AMBULATORY - HEALTHEAST (OUTPATIENT)
Dept: LAB | Facility: CLINIC | Age: 61
End: 2021-04-05

## 2021-04-05 ENCOUNTER — COMMUNICATION - HEALTHEAST (OUTPATIENT)
Dept: ANTICOAGULATION | Facility: CLINIC | Age: 61
End: 2021-04-05

## 2021-04-05 DIAGNOSIS — I27.82 CHRONIC PULMONARY EMBOLISM, UNSPECIFIED PULMONARY EMBOLISM TYPE, UNSPECIFIED WHETHER ACUTE COR PULMONALE PRESENT (H): ICD-10-CM

## 2021-04-05 DIAGNOSIS — Z86.718 HISTORY OF DVT (DEEP VEIN THROMBOSIS): ICD-10-CM

## 2021-04-05 DIAGNOSIS — I48.11 LONGSTANDING PERSISTENT ATRIAL FIBRILLATION (H): ICD-10-CM

## 2021-04-05 LAB — INR PPP: 1.8 (ref 0.9–1.1)

## 2021-04-15 ENCOUNTER — COMMUNICATION - HEALTHEAST (OUTPATIENT)
Dept: INTERNAL MEDICINE | Facility: CLINIC | Age: 61
End: 2021-04-15

## 2021-04-15 DIAGNOSIS — M15.9 GENERALIZED OSTEOARTHRITIS: ICD-10-CM

## 2021-04-19 ENCOUNTER — COMMUNICATION - HEALTHEAST (OUTPATIENT)
Dept: ANTICOAGULATION | Facility: CLINIC | Age: 61
End: 2021-04-19

## 2021-04-19 ENCOUNTER — AMBULATORY - HEALTHEAST (OUTPATIENT)
Dept: LAB | Facility: CLINIC | Age: 61
End: 2021-04-19

## 2021-04-19 DIAGNOSIS — I48.11 LONGSTANDING PERSISTENT ATRIAL FIBRILLATION (H): ICD-10-CM

## 2021-04-19 DIAGNOSIS — I27.82 CHRONIC PULMONARY EMBOLISM, UNSPECIFIED PULMONARY EMBOLISM TYPE, UNSPECIFIED WHETHER ACUTE COR PULMONALE PRESENT (H): ICD-10-CM

## 2021-04-19 DIAGNOSIS — Z86.718 HISTORY OF DVT (DEEP VEIN THROMBOSIS): ICD-10-CM

## 2021-04-19 LAB — INR PPP: 2.7 (ref 0.9–1.1)

## 2021-04-19 RX ORDER — CELECOXIB 200 MG/1
CAPSULE ORAL
Qty: 30 CAPSULE | Refills: 0 | Status: SHIPPED | OUTPATIENT
Start: 2021-04-19 | End: 2021-08-31

## 2021-05-11 ENCOUNTER — COMMUNICATION - HEALTHEAST (OUTPATIENT)
Dept: ANTICOAGULATION | Facility: CLINIC | Age: 61
End: 2021-05-11

## 2021-05-17 ENCOUNTER — AMBULATORY - HEALTHEAST (OUTPATIENT)
Dept: LAB | Facility: CLINIC | Age: 61
End: 2021-05-17

## 2021-05-17 ENCOUNTER — COMMUNICATION - HEALTHEAST (OUTPATIENT)
Dept: ANTICOAGULATION | Facility: CLINIC | Age: 61
End: 2021-05-17

## 2021-05-17 DIAGNOSIS — I48.11 LONGSTANDING PERSISTENT ATRIAL FIBRILLATION (H): ICD-10-CM

## 2021-05-17 DIAGNOSIS — Z86.718 HISTORY OF DVT (DEEP VEIN THROMBOSIS): ICD-10-CM

## 2021-05-17 DIAGNOSIS — I27.82 CHRONIC PULMONARY EMBOLISM, UNSPECIFIED PULMONARY EMBOLISM TYPE, UNSPECIFIED WHETHER ACUTE COR PULMONALE PRESENT (H): ICD-10-CM

## 2021-05-17 LAB — INR PPP: 2.5 (ref 0.9–1.1)

## 2021-05-26 ENCOUNTER — RECORDS - HEALTHEAST (OUTPATIENT)
Dept: ADMINISTRATIVE | Facility: CLINIC | Age: 61
End: 2021-05-26

## 2021-05-27 NOTE — TELEPHONE ENCOUNTER
ANTICOAGULATION  MANAGEMENT PROGRAM    Damien Mcclendon is overdue for INR check.  Reminder call made.    Left message for Damien. If returning call, please schedule INR check as soon as possible.    Rosa Maria Winkler RN

## 2021-05-27 NOTE — TELEPHONE ENCOUNTER
ANTICOAGULATION  MANAGEMENT PROGRAM    Damien Mcclendon is overdue for INR check.  Reminder call made.    Left message for Damien. If returning call, please schedule INR check as soon as possible.    Leonor Whitfield RN

## 2021-05-28 NOTE — TELEPHONE ENCOUNTER
Refills have been set up for Dr. Webb to review.    Spoke with the patient and relayed message below from Dr. Webb.  Patient verbalized understanding and had no further questions at this time.  Management has been notified per message from Dr. Webb.  Mar WHITE CMA/DORINDA....................5:03 PM

## 2021-05-28 NOTE — TELEPHONE ENCOUNTER
Please put in refills for everything.   Thanks.  6 months. Notify management, as I have not been aware.

## 2021-05-28 NOTE — TELEPHONE ENCOUNTER
Anticoagulation Management    Unable to reach Damien today.    Today's INR result of 3.50 is Supratherapeutic (goal INR of 2.0-3.0)     Follow up required to provide dosing instructions     Left message to take reduced dose of warfarin, 2.5 mg tonight   (has 5mg tabs).        ACN to follow up - tomorrow 4/25/19.    Rosa Maria Winkler RN

## 2021-05-28 NOTE — TELEPHONE ENCOUNTER
Hi Dr. Webb:    Damien reported he requested medication refills since 2/19/19, but none of his medications were refilled.  (see documented noted from 2/19/19.  Looks like it was sent to Dr. Jameson to refill.)    - Has only been taking warfarin, as directed. Since this medication is cheap.    - as you know he lost his job in February, but now has new job.    - Damien reported he has not taken since 2/19/19:   Diltiazem 120mg daily   Lisinopril 5mg daily   Metoprolol Succinate 200mg one tablet daily   Rosuvastatin 10mg daily.  (this he ran out last night.   PLEASE REFILL ALL THESE MEDICATIONS LISTED ABOVE.   HAS NEW PHARMACY -     [facility]     [address], [place] [address]     [phone]    - he has 6 months f/u with you on 5/2/19 @ 3:20p

## 2021-05-28 NOTE — TELEPHONE ENCOUNTER
Medications requested on 2/15/19 were approved by Dr. Jameson on 2/15/19 and sent to the [facility] pharmacy on [address].  Confirmation receipt was given that pharmacy received the order.      Patient did not indicate at that time his pharmacy had changed to [facility] as indicated in the message below.  And patient did not call the clinic to state he did not receive the refills, the clinic had no way of knowing the patient did not receive his medications.    Jeanette REA RN Clinical Supervisor...................11:54 AM

## 2021-05-28 NOTE — TELEPHONE ENCOUNTER
ANTICOAGULATION  MANAGEMENT    Assessment     Today's INR result of 3.50 is Supratherapeutic (goal INR of 2.0-3.0)        Warfarin taken as previously instructed    No new diet changes affecting INR    Potential interaction between had stopped all his medications since Feb 2019, since it was not refilled  and warfarin which may affect subsequent INRs    Continues to tolerate warfarin with no reported s/s of bleeding or thromboembolism     Previous INR was Therapeutic at 3.00 on 2/15/19.    Plan:     Spoke with Damien regarding INR result and instructed:     Warfarin Dosing Instructions:  (has 5mg tabs)   - Damien reported he received message yesterday to only take 2.5mg warfarin dose.   - Continue current warfarin dose 7.5 mg daily on Thursdays; and 5 mg daily rest of week.    Instructed patient to follow up no later than:  Scheduled to check INR during f/u visit with Dr. Webb on 5/2/19.    Education provided: target INR goal and significance of current INR result and potential interaction between warfarin and stopped all medications except Warfarin since 2/19, since they were not refilled     Damien verbalizes understanding and agrees to warfarin dosing plan.    Instructed to call the [facility] for any changes, questions or concerns. (#[phone])   ?   Rosa Maria Winkler RN    Subjective/Objective:      Damien Mcclendon, a 58 y.o. male is on warfarin.     Damien reports:     Home warfarin dose: as updated on anticoagulation calendar per template     Missed doses: No     Medication changes:  Yes:  Stopped all his medications, since they were no filled when he requested on 2/19/19/  (see documented note.  Not sure it was addressed).     S/S of bleeding or thromboembolism:  No     New Injury or illness:  No     Changes in diet or alcohol consumption:  No     Upcoming surgery, procedure or cardioversion:  No    Anticoagulation Episode Summary     Current INR goal:   2.0-3.0   TTR:   47.8 % (4.1 y)   Next INR check:    5/7/2019   INR from last check:   3.50! (4/23/2019)   Weekly max warfarin dose:      Target end date:      INR check location:      Preferred lab:      Send INR reminders to:   ANTICOAGULATION POOL C (DTN,VAD,CGR,GAV)    Indications    History of DVT (deep vein thrombosis) [Z86.718]           Comments:            Anticoagulation Care Providers     Provider Role Specialty Phone number    Ronald Webb MD Referring Internal Medicine [phone]

## 2021-05-28 NOTE — TELEPHONE ENCOUNTER
ANTICOAGULATION MANAGEMENT PROGRAM--Non-Compliance Chart Review    Damien Mcclendon is overdue for INR follow up.      INR Results:   Lab Results   Component Value Date    INR 3.00 (H) 02/15/2019    INR 2.10 (H) 01/18/2019    INR 1.70 (H) 12/28/2018         Chart reviewed, noted pt has an appt scheduled 4/23. Until INR results are available okay to continue non-compliance protocol       Leonor Whitfield RN

## 2021-05-28 NOTE — TELEPHONE ENCOUNTER
ANTICOAGULATION  MANAGEMENT    Assessment     Today's INR result of 1.80 is Subtherapeutic (goal INR of 2.0-3.0)        Warfarin taken as previously instructed    No new diet changes affecting INR    will resume all his other medications, as ordered by Dr. Webb.  His meds he requested refills on from Feb 2019, were not filled and he stopped all meds, except warfarin,    Continues to tolerate warfarin with no reported s/s of bleeding or thromboembolism     Previous INR was Supratherapeutic at 3.50 on 4/23/19.    Plan:     Left a detailed message for Damien regarding INR result and instructed:     Warfarin Dosing Instructions:   (has 5mg tabs)   - Continue current warfarin dose 7.5 mg daily on Thursdays; and 5 mg daily rest of week.    Instructed patient to follow up no later than:  1-2 wks.    Education provided: target INR goal and significance of current INR result and importance of notifying clinic for changes in medications    Instructed to call the [facility] for any changes, questions or concerns. (#[phone])   ?   Rosa Maria Winkler RN    Subjective/Objective:      Damien Mcclendon, a 58 y.o. male is on warfarin.     Damien reports:     Home warfarin dose: as updated on anticoagulation calendar per template     Missed doses: No     Medication changes:  Yes:  He was off all medications since Feb 2019, except for warfarin.      S/S of bleeding or thromboembolism:  No     New Injury or illness:  Yes:  Had f/u visit today with Dr. Webb - advised to resume all his medications as prescribed before.  (recently all refilled).     Changes in diet or alcohol consumption:  No     Upcoming surgery, procedure or cardioversion:  No    Anticoagulation Episode Summary     Current INR goal:   2.0-3.0   TTR:   47.8 % (4.1 y)   Next INR check:   5/16/2019   INR from last check:   1.80! (5/2/2019)   Weekly max warfarin dose:      Target end date:      INR check location:      Preferred lab:      Send INR reminders to:    ANTICOAGULATION POOL C (DTN,VAD,CGR,GAV)    Indications    History of DVT (deep vein thrombosis) [Z86.729]           Comments:            Anticoagulation Care Providers     Provider Role Specialty Phone number    Ronald Webb MD Referring Internal Medicine [phone]

## 2021-05-28 NOTE — PROGRESS NOTES
Office Visit - Follow Up   Damien Mcclendon   58 y.o. male    Date of Visit: 5/2/2019    Chief Complaint   Patient presents with     Diabetes     med f/u - fasting today for labs         Assessment and Plan   1. Chronic atrial fibrillation (H)  He needs rate control and compliance with medications.  Prescriptions were refilled.  I have urged him to start taking all of his medications immediately.  Fortunately did not discontinue his warfarin.  INR today.    2. Other chronic pulmonary embolism without acute cor pulmonale (H)  As above.  INR today.    3. Nicotine Dependence  Urged smoking cessation today.    4. Mixed hyperlipidemia  He has been off of Crestor.  It was going to cost him $600 if he paid out of pocket for it.  He will resume it and he will return for fasting lab work.  We will forego any lab work today.    Weight is down.  He reports that this is due to increased exercise at his job.  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    Will check A1c at next visit.  He does have history of prediabetes.        Return in about 6 months (around 11/2/2019) for fasting labs, Recheck, Annual physical.     History of Present Illness   This 58 y.o. old Meseret comes in today for follow-up.  He has been without medications for several weeks now.  He fortunately remained on his warfarin.  He was without insurance and he changed pharmacies.  Now he is back on some of his medications but could not afford them all.  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  He will be starting his insurance coverage in the next month or so.  He is continue to smoke about a half a pack a day.  He was feeling lightheaded and dizziness when he was off of his rate control medications.  He is feeling better now that he is back on metoprolol.  No chest pains.  Review of Systems: A comprehensive review of systems was negative except as noted.     Medications, Allergies and Problem List   Reviewed, reconciled and updated     Physical  Exam   General Appearance:   Pleasant gentleman.  Heart is mildly tachycardic and irregular.  Vital signs are noted.    /58 (Patient Site: Right Arm, Patient Position: Sitting, Cuff Size: Adult Regular)   Pulse 100   Ht 6' (1.829 m)   Wt 172 lb (78 kg)   SpO2 99%   BMI 23.33 kg/m           Additional Information   Current Outpatient Medications   Medication Sig Dispense Refill     blood pressure test kit-medium Kit Check blood pressure and heart rate once daily. 1 each 0     clobetasol (TEMOVATE) 0.05 % cream Apply to your lower leg daily x2 weeks, sooner if your swelling is reduced. 60 g 0     diltiazem (CARDIZEM CD) 120 MG 24 hr capsule Take 1 capsule (120 mg total) by mouth daily. 90 capsule 1     lisinopril (PRINIVIL,ZESTRIL) 5 MG tablet Take 1 tablet (5 mg total) by mouth daily. 90 tablet 1     metoprolol succinate (TOPROL-XL) 200 MG 24 hr tablet Take 1 tablet (200 mg total) by mouth daily. 90 tablet 1     rosuvastatin (CRESTOR) 10 MG tablet TAKE 1 TABLET BY MOUTH EVERYDAY AT BEDTIME 90 tablet 1     warfarin (COUMADIN/JANTOVEN) 5 MG tablet Take ONE tablet (5mg) to ONE & ONE-HALF tablets (7.5mg), by mouth daily as directed.  Adjust dose based on INR results. 110 tablet 0     celecoxib (CELEBREX) 200 MG capsule TAKE 1 CAPSULE BY MOUTH EVERY DAY 30 capsule 0     Current Facility-Administered Medications   Medication Dose Route Frequency Provider Last Rate Last Dose     lidocaine 2 % jelly (XYLOCAINE)   Topical PRN Julianna, Nany Sargent, CNP   1 application at 12/28/16 1531     Allergies   Allergen Reactions     Pravastatin      Memory changes, feels foggy      Social History     Tobacco Use     Smoking status: Current Every Day Smoker     Smokeless tobacco: Current User     Tobacco comment: stopped x 3 months ago   Substance Use Topics     Alcohol use: Yes     Alcohol/week: 1.2 oz     Types: 2 Cans of beer per week     Comment: drinks 1-2 times per week.     Drug use: No       Review and/or order of  clinical lab tests:  Review and/or order of radiology tests:  Review and/or order of medicine tests:  Discussion of test results with performing physician:  Decision to obtain old records and/or obtain history from someone other than the patient:  Review and summarization of old records and/or obtaining history from someone other than the patient and.or discussion of case with another health care provider:  Independent visualization of image, tracing or specimen itself:    Time: not applicable     Ronald Webb MD

## 2021-05-29 ENCOUNTER — RECORDS - HEALTHEAST (OUTPATIENT)
Dept: ADMINISTRATIVE | Facility: CLINIC | Age: 61
End: 2021-05-29

## 2021-05-29 NOTE — TELEPHONE ENCOUNTER
Anticoagulation Management    Unable to reach Damien today.    Today's INR result of 4.30 is Supratherapeutic (goal INR of 2.0-3.0)     Follow up required to discuss out of range INR     Left message to hold warfarin tonight    - follow safety precautions to avoid any serious injuries.  If any injuries incurred (hitting head on ground or hard surfaces), to go directly to ED for evaluation.   - increase vegetable or salad intake tonight.        ACN to follow up - tomorrow on 5/22/19    Rosa Maria Winkler RN

## 2021-05-29 NOTE — TELEPHONE ENCOUNTER
ANTICOAGULATION  MANAGEMENT    Assessment     Today's INR result of 4.30 is Supratherapeutic (goal INR of 2.0-3.0)        Warfarin taken as previously instructed    No new diet changes affecting INR    Potential interaction between with the exception of warfarin, recently resumed all his medications and warfarin which may affect subsequent INRs    Continues to tolerate warfarin with no reported s/s of bleeding or thromboembolism     Previous INR was Subtherapeutic at 1.80 on 5/2/19.    Plan:     Spoke with Damien regarding INR result and instructed:     Warfarin Dosing Instructions:  (has 5mg tabs)   - reported warfarin dose HELD last night   - advised to hold warfarin dose again tonight, 5/22.   - Change warfarin dose to 5 mg daily.  (6.7 % change)    Instructed patient to follow up no later than:  7-10 days.   - scheduled on 5/28/19 @ [facility].    Education provided: importance of consistent vitamin K intake, target INR goal and significance of current INR result, no interaction anticipated between warfarin and in resuming his usual meds with Diltiazem, Lisinopril, Metoprolol Succinate  and importance of notifying clinic for changes in medications    Damien verbalizes understanding and agrees to warfarin dosing plan.    Instructed to call the [facility] for any changes, questions or concerns. (#[phone])   ?   Rosa Maria Winkler RN    Subjective/Objective:      Damien Mcclendon, a 59 y.o. male is on warfarin.     Damien reports:     Home warfarin dose: as updated on anticoagulation calendar per template     Missed doses: No     Medication changes:  Yes:  Since 5/2/19, with the exception of warfarin therapy, he had been off all his medications, due to it was not renewed in Feb 2019, and patient thought they were all denied by doctor.  In addition, during this time, had financial difficulties due to loss of job.   - resumed on Diltiazem, Lisiopril, Metoprolol Succinate.  Has not resumed Crestor or Celebrex - d/t  $$.     S/S of bleeding or thromboembolism:  No.  Denied any s/sx of any bleeding or bruising.     New Injury or illness:  No     Changes in diet or alcohol consumption:  No     Upcoming surgery, procedure or cardioversion:  No    Anticoagulation Episode Summary     Current INR goal:   2.0-3.0   TTR:   47.7 % (4.2 y)   Next INR check:   5/31/2019   INR from last check:   4.30! (5/21/2019)   Weekly max warfarin dose:      Target end date:      INR check location:      Preferred lab:      Send INR reminders to:   [facility]    Indications    History of DVT (deep vein thrombosis) [Z86.718]           Comments:            Anticoagulation Care Providers     Provider Role Specialty Phone number    Ronald Webb MD Referring Internal Medicine [phone]

## 2021-05-29 NOTE — TELEPHONE ENCOUNTER
ANTICOAGULATION  MANAGEMENT    Assessment     Today's INR result of 3.00 is Therapeutic (goal INR of 2.0-3.0)        More warfarin taken than instructed and HELD warfarin for 2 days, which may be affecting INR    No new diet changes affecting INR    No new medication/supplements affecting INR    Continues to tolerate warfarin with no reported s/s of bleeding or thromboembolism     Previous INR was Supratherapeutic at 4.30 on 5/21/19.    Plan:     Left a detailed message for Damien regarding INR result and instructed:    1.  Advised to take correct warfarin dose, as instructed.   2.  To call, if any questions with his miriamairn dosing.    Warfarin Dosing Instructions:  (has 5mg tabs)   - Continue current warfarin dose 5 mg daily.    Instructed patient to follow up no later than:  2 wks.    Education provided: importance of consistent vitamin K intake, target INR goal and significance of current INR result and importance of taking warfarin as instructed      Instructed to call the [facility] for any changes, questions or concerns. (#[phone])   ?   Rosa Maria Winkler RN    Subjective/Objective:      Damienvenu Mcclendon, a 59 y.o. male is on warfarin.     Damien reports:     Home warfarin dose: template written incorrectly; and taking more than instructed.     Missed doses: Yes: held warfarin for 2 days on 5/21-22.     Medication changes:  Yes:  Was taking more warfarin at 37.5mg/wk, instead of 35mg/wk as instructed.     S/S of bleeding or thromboembolism:  No     New Injury or illness:  No     Changes in diet or alcohol consumption:  No     Upcoming surgery, procedure or cardioversion:  No    Anticoagulation Episode Summary     Current INR goal:   2.0-3.0   TTR:   47.5 % (4.2 y)   Next INR check:   6/11/2019   INR from last check:   3.00 (5/28/2019)   Weekly max warfarin dose:      Target end date:      INR check location:      Preferred lab:      Send INR reminders to:   [facility]     History of DVT (deep vein thrombosis) [Z86.718]           Comments:            Anticoagulation Care Providers     Provider Role Specialty Phone number    Ronald Webb MD Referring Internal Medicine [phone]

## 2021-05-30 VITALS — HEIGHT: 72 IN | WEIGHT: 190 LBS | BODY MASS INDEX: 25.73 KG/M2

## 2021-05-30 VITALS — WEIGHT: 182 LBS | HEIGHT: 72 IN | BODY MASS INDEX: 24.65 KG/M2

## 2021-05-30 NOTE — TELEPHONE ENCOUNTER
ANTICOAGULATION  MANAGEMENT PROGRAM    Damien Mcclendon is overdue for INR check.  Reminder call made.    Left message for Damien. If returning call, please schedule INR check as soon as possible.    Eunice Reyes RN

## 2021-05-30 NOTE — TELEPHONE ENCOUNTER
ANTICOAGULATION  MANAGEMENT    Assessment     Today's INR result of 2.80 is Therapeutic (goal INR of 2.0-3.0)        Warfarin taken as previously instructed    No new diet changes affecting INR    No new medication/supplements affecting INR    Continues to tolerate warfarin with no reported s/s of bleeding or thromboembolism     Previous INR was Therapeutic at 3.00 on 5/28/19.    Plan:     Left a detailed message for Damien regarding INR result and instructed:     Warfarin Dosing Instructions:   (has 5mg tabs)   - Continue current warfarin dose 5 mg daily.    Instructed patient to follow up no later than:  4 wks.    Education provided: target INR goal and significance of current INR result, importance of notifying clinic for changes in medications, monitoring for bleeding signs and symptoms and importance of notifying clinic for diarrhea, nausea/vomiting, reduced intake and/or illness     Instructed to call the [facility] for any changes, questions or concerns. (#[phone])   ?   Rosa Maria Winkler RN    Subjective/Objective:      Damien Mcclendon, a 59 y.o. male is on warfarin.     Damien reports:     Home warfarin dose: as updated on anticoagulation calendar per template     Missed doses: No     Medication changes:  No     S/S of bleeding or thromboembolism:  No     New Injury or illness:  No     Changes in diet or alcohol consumption:  No     Upcoming surgery, procedure or cardioversion:  No    Anticoagulation Episode Summary     Current INR goal:   2.0-3.0   TTR:   48.7 % (4.3 y)   Next INR check:   7/30/2019   INR from last check:   2.80 (7/2/2019)   Weekly max warfarin dose:      Target end date:      INR check location:      Preferred lab:      Send INR reminders to:   [facility]    Indications    History of DVT (deep vein thrombosis) [Z86.718]           Comments:            Anticoagulation Care Providers     Provider Role Specialty Phone number    Ronald Webb MD Referring Internal  Medicine [phone]

## 2021-05-31 VITALS — WEIGHT: 183 LBS | BODY MASS INDEX: 24.79 KG/M2 | HEIGHT: 72 IN

## 2021-05-31 VITALS — BODY MASS INDEX: 24.79 KG/M2 | WEIGHT: 183 LBS | HEIGHT: 72 IN

## 2021-05-31 VITALS — HEIGHT: 72 IN | WEIGHT: 183 LBS | BODY MASS INDEX: 24.79 KG/M2

## 2021-05-31 VITALS — WEIGHT: 183.9 LBS | BODY MASS INDEX: 24.91 KG/M2 | HEIGHT: 72 IN

## 2021-05-31 NOTE — TELEPHONE ENCOUNTER
Anticoagulation Management    Unable to reach Damien today.    Today's INR result of 4.90 is Supratherapeutic (goal INR of 2.0-3.0)     Follow up required to discuss out of range INR     Left message to hold warfarin tonight         [facility] to follow up - Wed. 8/14.    Rosa Maria Winkler RN

## 2021-05-31 NOTE — TELEPHONE ENCOUNTER
ANTICOAGULATION  MANAGEMENT    Assessment     Today's INR result of 4.90 is Supratherapeutic (goal INR of 2.0-3.0)        Warfarin taken as previously instructed    No new diet changes affecting INR    No new medication/supplements affecting INR    Continues to tolerate warfarin with no reported s/s of bleeding or thromboembolism     Previous INR was Therapeutic at 2.80 on 7/2/19.    Plan:     Left a detailed message for Damien regarding INR result and instructed:    1.  Several calls made, with no call back.   2.  Hopefully, he HELD warfarin doses, as ordered on 8/13-14.   3.  Advised, if he gets a chance, to give me call back.      Warfarin Dosing Instructions:  (has 5mg tabs)   - had advised to HOLD warfarin on 8/13-14.   - thereafter, Continue current warfarin dose 5 mg daily.    Instructed patient to follow up no later than:  Recommended to check in 7-10 days.    Education provided: target INR goal and significance of current INR result and monitoring for bleeding signs and symptoms    Instructed to call the [facility] for any changes, questions or concerns. (#[phone])   ?   Rosa Maria Winkler RN    Subjective/Objective:      Damien Mcclendon, a 59 y.o. male is on warfarin.     Damien reports:     Home warfarin dose: as updated on anticoagulation calendar per template     Missed doses: No     Medication changes:  No     S/S of bleeding or thromboembolism:  No     New Injury or illness:  No     Changes in diet or alcohol consumption:  No     Upcoming surgery, procedure or cardioversion:  No    Anticoagulation Episode Summary     Current INR goal:   2.0-3.0   TTR:   47.7 % (4.4 y)   Next INR check:   8/23/2019   INR from last check:   4.90! (8/13/2019)   Weekly max warfarin dose:      Target end date:      INR check location:      Preferred lab:      Send INR reminders to:   [facility]    Indications    History of DVT (deep vein thrombosis) [Z86.281]           Comments:             Anticoagulation Care Providers     Provider Role Specialty Phone number    Ronald Webb MD Referring Internal Medicine [phone]

## 2021-05-31 NOTE — TELEPHONE ENCOUNTER
Anticoagulation Management    Unable to reach Damien today - has not called back yet.    Today's INR result of 4.90 is Supratherapeutic (goal INR of 2.0-3.0)     Follow up required to discuss out of range INR     Left message to hold warfarin tonight         ACN to follow up - will f/u on 8/15.    Rosa Maria Winkler RN

## 2021-06-01 NOTE — TELEPHONE ENCOUNTER
ANTICOAGULATION MANAGEMENT PROGRAM--Non-Compliance Chart Review    Damien Mcclendon is overdue for INR follow up.      INR Results:   Lab Results   Component Value Date    INR 4.90 (H) 08/13/2019    INR 2.80 (H) 07/02/2019    INR 3.00 (H) 05/28/2019         Chart reviewed, continue non-compliance protocol       Leonor Whitfield RN

## 2021-06-02 ENCOUNTER — RECORDS - HEALTHEAST (OUTPATIENT)
Dept: ADMINISTRATIVE | Facility: CLINIC | Age: 61
End: 2021-06-02

## 2021-06-02 VITALS — WEIGHT: 180 LBS | BODY MASS INDEX: 24.38 KG/M2 | HEIGHT: 72 IN

## 2021-06-02 VITALS — WEIGHT: 184 LBS | HEIGHT: 72 IN | BODY MASS INDEX: 24.92 KG/M2

## 2021-06-02 VITALS — HEIGHT: 72 IN | WEIGHT: 184 LBS | BODY MASS INDEX: 24.92 KG/M2

## 2021-06-02 NOTE — TELEPHONE ENCOUNTER
ANTICOAGULATION  MANAGEMENT    Assessment     Today's INR result of 2.0 is Therapeutic (goal INR of 2.0-3.0)        Warfarin taken as previously instructed    No new diet changes affecting INR    Potential interaction between Vicodin and warfarin which may affect subsequent INRs    Continues to tolerate warfarin with no reported s/s of bleeding or thromboembolism     Previous INR was Supratherapeutic on 8/13    Plan:     Left a detailed message for Damien regarding INR result and instructed:     Warfarin Dosing Instructions:  Continue current warfarin dose 5 mg daily   Instructed patient to follow up no later than: 1-2 weeks    Education provided: importance of therapeutic range, target INR goal and significance of current INR result and importance of following up for INR monitoring at instructed interval    Instructed to call the [facility] for any changes, questions or concerns. (#[phone])   ?   Eunice Reyes RN    Subjective/Objective:      Damien Mcclendon, a 59 y.o. male is on warfarin.     Damien reports:     Home warfarin dose: as updated on anticoagulation calendar per template     Missed doses: No     Medication changes:  Yes: Vicodin as needed for pain     S/S of bleeding or thromboembolism:  No     New Injury or illness:  Yes: hip fracture last week (seen in ED)     Changes in diet or alcohol consumption:  No     Upcoming surgery, procedure or cardioversion:  No    Anticoagulation Episode Summary     Current INR goal:   2.0-3.0   TTR:   59.8 %   Next INR check:   10/30/2019   INR from last check:   2.00 (10/16/2019)   Weekly max warfarin dose:      Target end date:      INR check location:      Preferred lab:      Send INR reminders to:   [facility]    Indications    History of DVT (deep vein thrombosis) [Z86.718]           Comments:            Anticoagulation Care Providers     Provider Role Specialty Phone number    Ronald Webb MD Referring Internal Medicine [phone]

## 2021-06-02 NOTE — TELEPHONE ENCOUNTER
ANTICOAGULATION  MANAGEMENT: Discharge Review    Damien Mcclendon chart reviewed for anticoagulation continuity of care    Emergency room visit on  10/10/19 for right hip / leg pain.   - tripped over his cat and FELL one wk prior to presentation in ED.   - pain occurs only when he bears weight   - Pelvis / right hip - showed closed non-displaced fracture through the greater trochanter of the right femur. Right hip replacement appears normally aligned. No other fracture    Discharge disposition: Home    INR Results:     No results for input(s): INR in the last 168 hours.    Warfarin inpatient management: home regimen continued    Warfarin discharge instructions: home regimen continued     Medication Changes Affecting Anticoagulation: Yes: hydrocodone-Acetaminophen 5/325mg one tb every 4 hrs, PRN.    Additional Factors Affecting Anticoagulation: Yes.  Fall and fracture can increase INR.    Plan     Recommend to check INR soon due to fall / fracture, which can increase INR or risk for bleeding. due to now.  Last INR was on 8/13/19 and was supra therapeutic.    - several attempt with reminder calls were done    Left a detailed message for Damien - very important to check his INR soon, due to recent FAlLL and Fracture     Anticoagulation calendar updated    Rosa Maria Winkler, RN

## 2021-06-02 NOTE — TELEPHONE ENCOUNTER
Already called and left detailed message with Damien.     - see today's ED/Hospital discharge review for 10/10/19 encounter.     - stressed the importance and necessary INR check after a FALL and fracture, can increase INR levels, due to trauma.     - in addition, last INR on 8/13/19 was supra.

## 2021-06-02 NOTE — TELEPHONE ENCOUNTER
He was just in the ER with a broken hip.  Please follow-up with him and have him have an INR.  Please let him know that we will have to fire him for noncompliance if he cannot get in for an INR soon.  If insurance or payment issues are the reason he is not coming in, then please have management and  work with him regarding indigent care, etc.

## 2021-06-02 NOTE — TELEPHONE ENCOUNTER
Who is calling:  Patient   Reason for Call:  Patient returning missed call from [facility] team.    At time of patient call no contact information for [facility]  Date of last appointment with primary care: 05/02/19  Okay to leave a detailed message: Yes

## 2021-06-02 NOTE — TELEPHONE ENCOUNTER
Refill Approved    Rx renewed per Medication Renewal Policy. Medication was last renewed on .  diltiazem (CARDIZEM CD) 120 MG 24 hr capsule 90 capsule 1 4/24/2019     lisinopril (PRINIVIL,ZESTRIL) 5 MG tablet 90 tablet 1 4/24/2019     metoprolol succinate (TOPROL-XL) 200 MG 24 hr tablet 90 tablet 1 4/24/2019     Oliva Colon, [facility] Triage/Med Refill 11/2/2019     Requested Prescriptions   Pending Prescriptions Disp Refills     diltiazem (CARDIZEM CD) 120 MG 24 hr capsule 90 capsule 1     Sig: Take 1 capsule (120 mg total) by mouth daily.       Calcium-Channel Blockers Protocol Passed - 11/1/2019  3:51 PM        Passed - PCP or prescribing provider visit in past 12 months or next 3 months     Last office visit with prescriber/PCP: 5/2/2019 Ronald Webb MD OR same dept: 5/2/2019 Ronald Webb MD OR same specialty: 5/2/2019 Ronald Webb MD  Last physical: 11/1/2018 Last MTM visit: Visit date not found   Next visit within 3 mo: Visit date not found  Next physical within 3 mo: Visit date not found  Prescriber OR PCP: Ronald Webb MD  Last diagnosis associated with med order: 1. A-fib (H)  - diltiazem (CARDIZEM CD) 120 MG 24 hr capsule; Take 1 capsule (120 mg total) by mouth daily.  Dispense: 90 capsule; Refill: 1    2. Type 2 diabetes mellitus (H)  - lisinopril (PRINIVIL,ZESTRIL) 5 MG tablet; Take 1 tablet (5 mg total) by mouth daily.  Dispense: 90 tablet; Refill: 1    3. Persistent atrial fibrillation  - metoprolol succinate (TOPROL-XL) 200 MG 24 hr tablet; Take 1 tablet (200 mg total) by mouth daily.  Dispense: 90 tablet; Refill: 1    If protocol passes may refill for 12 months if within 3 months of last provider visit (or a total of 15 months).             Passed - Blood pressure filed in past 12 months     BP Readings from Last 1 Encounters:   10/10/19 (!) 144/95             lisinopril (PRINIVIL,ZESTRIL) 5 MG tablet 90 tablet 1     Sig: Take 1 tablet (5 mg total) by mouth daily.       Ace  Inhibitors Refill Protocol Passed - 11/1/2019  3:51 PM        Passed - PCP or prescribing provider visit in past 12 months       Last office visit with prescriber/PCP: 5/2/2019 Ronald Webb MD OR same dept: 5/2/2019 Ronald Webb MD OR same specialty: 5/2/2019 Ronald Webb MD  Last physical: 11/1/2018 Last MTM visit: Visit date not found   Next visit within 3 mo: Visit date not found  Next physical within 3 mo: Visit date not found  Prescriber OR PCP: Ronald Webb MD  Last diagnosis associated with med order: 1. A-fib (H)  - diltiazem (CARDIZEM CD) 120 MG 24 hr capsule; Take 1 capsule (120 mg total) by mouth daily.  Dispense: 90 capsule; Refill: 1    2. Type 2 diabetes mellitus (H)  - lisinopril (PRINIVIL,ZESTRIL) 5 MG tablet; Take 1 tablet (5 mg total) by mouth daily.  Dispense: 90 tablet; Refill: 1    3. Persistent atrial fibrillation  - metoprolol succinate (TOPROL-XL) 200 MG 24 hr tablet; Take 1 tablet (200 mg total) by mouth daily.  Dispense: 90 tablet; Refill: 1    If protocol passes may refill for 12 months if within 3 months of last provider visit (or a total of 15 months).             Passed - Serum Potassium in past 12 months     Lab Results   Component Value Date    Potassium 4.4 11/21/2018             Passed - Blood pressure filed in past 12 months     BP Readings from Last 1 Encounters:   10/10/19 (!) 144/95             Passed - Serum Creatinine in past 12 months     Creatinine   Date Value Ref Range Status   11/01/2018 1.13 0.70 - 1.30 mg/dL Final             metoprolol succinate (TOPROL-XL) 200 MG 24 hr tablet 90 tablet 1     Sig: Take 1 tablet (200 mg total) by mouth daily.       Beta-Blockers Refill Protocol Passed - 11/1/2019  3:51 PM        Passed - PCP or prescribing provider visit in past 12 months or next 3 months     Last office visit with prescriber/PCP: 5/2/2019 Ronald Webb MD OR same dept: 5/2/2019 oRnald Webb MD OR same specialty: 5/2/2019 Ronald Webb MD  Last  physical: 11/1/2018 Last MTM visit: Visit date not found   Next visit within 3 mo: Visit date not found  Next physical within 3 mo: Visit date not found  Prescriber OR PCP: Ronald Webb MD  Last diagnosis associated with med order: 1. A-fib (H)  - diltiazem (CARDIZEM CD) 120 MG 24 hr capsule; Take 1 capsule (120 mg total) by mouth daily.  Dispense: 90 capsule; Refill: 1    2. Type 2 diabetes mellitus (H)  - lisinopril (PRINIVIL,ZESTRIL) 5 MG tablet; Take 1 tablet (5 mg total) by mouth daily.  Dispense: 90 tablet; Refill: 1    3. Persistent atrial fibrillation  - metoprolol succinate (TOPROL-XL) 200 MG 24 hr tablet; Take 1 tablet (200 mg total) by mouth daily.  Dispense: 90 tablet; Refill: 1    If protocol passes may refill for 12 months if within 3 months of last provider visit (or a total of 15 months).             Passed - Blood pressure filed in past 12 months     BP Readings from Last 1 Encounters:   10/10/19 (!) 144/95

## 2021-06-02 NOTE — TELEPHONE ENCOUNTER
ANTICOAGULATION MANAGEMENT PROGRAM--FINAL REMINDER NOTIFICATION     Dr. Webb,    Your patient, Damien Mcclendon, has been under the care of the [facility] for warfarin management. Our records indicate that Damien is either past due for an INR check or has not followed clinic staff recommendations. The patient s last INR was 4.9 on 8/13/2019.      Damien received two phone calls and one letter over the last several weeks in attempt to arrange a follow up appointment.  Damien is being sent a final reminder letter today to attempt to arrange a follow up appointment.      The [facility]tico[facility] Management [facility] is unable to manage anticoagulation therapy for non-compliant patients. If Damien does not schedule follow up within 3 weeks from the date of this letter,  you can decide to resume management of Damien's warfarin therapy or Damien can be moved to our noncompliance status, which would consist of a phone call every 6 weeks until INR is checked or when Damien is up for yearly renewal.     Please don t hesitate to contact the Anticoagulation Management Program at [phone] if you have any questions or concerns.     Sincerely,     [facility]

## 2021-06-03 VITALS — HEIGHT: 72 IN | WEIGHT: 172 LBS | BODY MASS INDEX: 23.3 KG/M2

## 2021-06-03 VITALS
SYSTOLIC BLOOD PRESSURE: 130 MMHG | BODY MASS INDEX: 23.16 KG/M2 | HEART RATE: 98 BPM | WEIGHT: 171 LBS | HEIGHT: 72 IN | OXYGEN SATURATION: 97 % | DIASTOLIC BLOOD PRESSURE: 95 MMHG

## 2021-06-03 NOTE — PROGRESS NOTES
Office Visit - Follow Up   Damien Mcclendon   59 y.o. male    Date of Visit: 11/11/2019    Chief Complaint   Patient presents with     Diabetes     6 mo f/u - fasting today      Letter for School/Work     needs letter for return to work - was in ED in Oct for Rt Hip Fx      INR Check     Hyperlipidemia     not taking Crestor due cost         Assessment and Plan   1. Closed fracture of right hip with routine healing, subsequent encounter  He wants a work release to let him go back to work and I told him I would not allow that unless it is a light duty with no climbing on ladders and no lifting until he gets into see orthopedic surgery.  He is also without insurance have asked our care management team contact him.  - Ambulatory referral to Care Management (Primary Care)  - Ambulatory referral to Orthopedic Surgery    2. Longstanding persistent atrial fibrillation  INR today for monitoring.  Continue metoprolol.    3. Avascular Necrosis  Follow-up with orthopedics.    4. History of DVT (deep vein thrombosis)    - INR    5. Mixed hyperlipidemia  Once he is able to afford medications again will need to resume rosuvastatin.    6. Tobacco abuse  Urged smoking cessation today    7. Essential hypertension, benign  Blood pressure is a little high, possibly due to smoking and not taking his medications as prescribed.  I have urged compliance as much as possible.        Return in about 4 weeks (around 12/9/2019) for Recheck.     History of Present Illness   This 59 y.o. old patient comes in today for follow-up.  He has not been doing too well.  He does not have insurance currently.  He was working but took a fall at the beginning of October.  Tripped over his cat.  Fell on his hip and had a greater trochanter fracture.  He was supposed to follow-up with orthopedics but never did such.  He has a history of hip arthroplasties due to avascular necrosis.  He is not been taking his medications as prescribed because he is without  insurance.  He continues to smoke cigarettes.  He is not drinking alcohol to excess and was not drinking when he fell.  He is on his warfarin fortunately.    Review of Systems: A comprehensive review of systems was negative except as noted.     Medications, Allergies and Problem List   Reviewed, reconciled and updated  Post Discharge Medication Reconciliation Status:      Physical Exam   General Appearance:   Pleasant gentleman.  He walks slowly and gets up slowly as well.  Considerable limp on the right.    BP (!) 130/95 (Patient Site: Left Arm, Patient Position: Sitting, Cuff Size: Adult Regular)   Pulse 98   Ht 6' (1.829 m)   Wt 171 lb (77.6 kg)   SpO2 97%   BMI 23.19 kg/m           Additional Information   Current Outpatient Medications   Medication Sig Dispense Refill     blood pressure test kit-medium Kit Check blood pressure and heart rate once daily. 1 each 0     lisinopril (PRINIVIL,ZESTRIL) 5 MG tablet Take 1 tablet (5 mg total) by mouth daily. 90 tablet 0     metoprolol succinate (TOPROL-XL) 200 MG 24 hr tablet Take 1 tablet (200 mg total) by mouth daily. 90 tablet 0     warfarin (COUMADIN/JANTOVEN) 5 MG tablet Take ONE tablet (5mg) daily by mouth, as directed.  Adjust dose based on INR results. 100 tablet 1     rosuvastatin (CRESTOR) 10 MG tablet TAKE 1 TABLET BY MOUTH EVERYDAY AT BEDTIME 90 tablet 1     Current Facility-Administered Medications   Medication Dose Route Frequency Provider Last Rate Last Dose     lidocaine 2 % jelly (XYLOCAINE)   Topical PRN Nnay Levine, CNP   1 application at 12/28/16 1531     Allergies   Allergen Reactions     Pravastatin      Memory changes, feels foggy      Social History     Tobacco Use     Smoking status: Current Every Day Smoker     Smokeless tobacco: Current User     Tobacco comment: stopped x 3 months ago   Substance Use Topics     Alcohol use: Yes     Alcohol/week: 2.0 standard drinks     Types: 2 Cans of beer per week     Comment: drinks 1-2 times  per week.     Drug use: No       Review and/or order of clinical lab tests:  Review and/or order of radiology tests:  Review and/or order of medicine tests:  Discussion of test results with performing physician:  Decision to obtain old records and/or obtain history from someone other than the patient:  Review and summarization of old records and/or obtaining history from someone other than the patient and.or discussion of case with another health care provider:  Independent visualization of image, tracing or specimen itself:    Time: not applicable     Ronald Webb MD

## 2021-06-03 NOTE — TELEPHONE ENCOUNTER
Provider Review: Anticoagulation Annual Referral Renewal    ACM Renewal Decision:  Renew ACM warfarin management      INR Range:   Continue management at current INR goal   Anticipated Duration of Therapy (from today):  Long-term anticoagulation      Ronald Webb MD  4:52 PM

## 2021-06-03 NOTE — TELEPHONE ENCOUNTER
ANTICOAGULATION  MANAGEMENT PROGRAM    Damien Mcclendon is overdue for INR check.     Left message to check INR at upcoming office visit on 11/11/19      Rosa Maria Winkler RN

## 2021-06-03 NOTE — TELEPHONE ENCOUNTER
"Anticoagulation Annual Referral Renewal Review    Damien Mcclendon's chart reviewed for annual renewal of referral to anticoagulation monitoring.        Criteria for anticoagulation nurse and/or pharmacist renewal met   Warfarin indication: Atrial Fibrillation and DVT Yes, per indication   Current with INR monitoring/compliant Yes Yes   Date of last office visit 5/2/19 Yes, had office visit within last year   Time in Therapeutic Range (TTR) 41.46 % No, TTR < 60 %       Damien Mcclendon did NOT meet all criteria for anticoagulation management program initiated renewal and requires provider review. Using dot phrase, \".acmrenewalprovider\", please advise if [place] anticoagulation management referral should be renewed or if patient should be seen in office to review anticoagulation therapy      Eunice Reyes RN  10:10 AM      "

## 2021-06-03 NOTE — PROGRESS NOTES
Scheduled Follow Up Call: Attempt 1 Community Health Worker called and left a message for the patient. If the patient is returning my call, please transfer the patient to Sara  at ext. [phone].

## 2021-06-03 NOTE — TELEPHONE ENCOUNTER
ANTICOAGULATION  MANAGEMENT    Assessment     Today's INR result of 2.4 is Therapeutic (goal INR of 2.0-3.0)        Warfarin taken as previously instructed    No new diet changes affecting INR    No new medication/supplements affecting INR    Continues to tolerate warfarin with no reported s/s of bleeding or thromboembolism     Previous INR was Therapeutic    Plan:     Left a detailed message for Damien regarding INR result and instructed:     Warfarin Dosing Instructions:  Continue current warfarin dose 5 mg daily   Instructed patient to follow up no later than: 4 weeks    Education provided: importance of therapeutic range    Instructed to call the [facility] for any changes, questions or concerns. (#[phone])   ?   Eunice Reyes RN    Subjective/Objective:      Damienvenu Mcclendon, a 59 y.o. male is on warfarin.     Damien reports:     Home warfarin dose: as updated on anticoagulation calendar per template     Missed doses: No     Medication changes:  No     S/S of bleeding or thromboembolism:  No     New Injury or illness:  No     Changes in diet or alcohol consumption:  No     Upcoming surgery, procedure or cardioversion:  No    Anticoagulation Episode Summary     Current INR goal:   2.0-3.0   TTR:   59.8 %   Next INR check:   12/9/2019   INR from last check:   2.40 (11/11/2019)   Weekly max warfarin dose:      Target end date:      INR check location:      Preferred lab:      Send INR reminders to:   [facility]    Indications    History of DVT (deep vein thrombosis) [Z86.718]           Comments:            Anticoagulation Care Providers     Provider Role Specialty Phone number    Ronald Webb MD Referring Internal Medicine [phone]

## 2021-06-03 NOTE — PROGRESS NOTES
Community Health Worker called and left a message for the patient.  If the patient is returning my call, please transfer the patient to Sara at ext. [phone].   Patient has been mailed a unreachable letter and was provided with [facility] contact information if they are interested in accessing Clinic Care Coordination.  Order for Care Management has been closed, no further outreach will be done at this time and patient can be re-referred.

## 2021-06-04 VITALS
HEART RATE: 56 BPM | OXYGEN SATURATION: 95 % | DIASTOLIC BLOOD PRESSURE: 62 MMHG | TEMPERATURE: 98.3 F | WEIGHT: 183 LBS | BODY MASS INDEX: 24.79 KG/M2 | HEIGHT: 72 IN | SYSTOLIC BLOOD PRESSURE: 100 MMHG

## 2021-06-04 VITALS
DIASTOLIC BLOOD PRESSURE: 78 MMHG | BODY MASS INDEX: 24.65 KG/M2 | WEIGHT: 182 LBS | OXYGEN SATURATION: 96 % | HEIGHT: 72 IN | HEART RATE: 72 BPM | SYSTOLIC BLOOD PRESSURE: 112 MMHG

## 2021-06-04 VITALS
OXYGEN SATURATION: 98 % | DIASTOLIC BLOOD PRESSURE: 98 MMHG | SYSTOLIC BLOOD PRESSURE: 132 MMHG | WEIGHT: 180 LBS | HEART RATE: 88 BPM | BODY MASS INDEX: 24.38 KG/M2 | HEIGHT: 72 IN

## 2021-06-04 NOTE — TELEPHONE ENCOUNTER
ANTICOAGULATION  MANAGEMENT PROGRAM    Damien Mcclendon is overdue for INR check.     Spoke with Damien and scheduled INR appointment on 12/18/19 @ [facility].      Rosa Maria Winkler RN

## 2021-06-04 NOTE — TELEPHONE ENCOUNTER
ANTICOAGULATION  MANAGEMENT    Assessment     Today's INR result of 3.2 is Supratherapeutic (goal INR of 2.0-3.0)        Warfarin taken as previously instructed    No new diet changes affecting INR    No new medication/supplements affecting INR    Continues to tolerate warfarin with no reported s/s of bleeding or thromboembolism     Previous INR was Therapeutic x 2 on current dose     Plan:     Left a detailed message for Damien regarding INR result and instructed:     Warfarin Dosing Instructions:  Continue current warfarin dose 5 mg daily     Instructed patient to follow up no later than: 2 weeks     Education provided: importance of therapeutic range    Instructed to call the [facility] for any changes, questions or concerns. (#[phone])   ?   Mariela Perez RN    Subjective/Objective:      Damien JAMES Mcclendon, a 59 y.o. male is on warfarin.     Damien reports per [facility] template:     Home warfarin dose: as updated on anticoagulation calendar per template     Missed doses: No     Medication changes:  No     S/S of bleeding or thromboembolism:  No     New Injury or illness:  No     Changes in diet or alcohol consumption:  No     Upcoming surgery, procedure or cardioversion:  No    Anticoagulation Episode Summary     Current INR goal:   2.0-3.0   TTR:   44.0 % (1 y)   Next INR check:   1/2/2020   INR from last check:   3.20! (12/18/2019)   Weekly max warfarin dose:      Target end date:      INR check location:      Preferred lab:      Send INR reminders to:   [facility]    Indications    History of DVT (deep vein thrombosis) [Z86.718]           Comments:            Anticoagulation Care Providers     Provider Role Specialty Phone number    Ronald Webb MD Referring Internal Medicine [phone]

## 2021-06-05 VITALS
HEIGHT: 72 IN | SYSTOLIC BLOOD PRESSURE: 106 MMHG | BODY MASS INDEX: 24.16 KG/M2 | DIASTOLIC BLOOD PRESSURE: 60 MMHG | OXYGEN SATURATION: 98 % | HEART RATE: 98 BPM | WEIGHT: 178.4 LBS

## 2021-06-05 NOTE — PROGRESS NOTES
Office Visit - Physical   Damien Mcclendon   59 y.o.  male    Date of visit: 1/7/2020  Physician: Ronald Webb MD     Assessment and Plan   1. Routine general medical examination at a health care facility  Urged smoking cessation at length.  Refer to ophthalmology for an eye exam.  He is due for a density.  Urged compliance with medications and follow-up.  Due for colonoscopy.  Urged dental care.  - Ambulatory referral to Ophthalmology    2. Dizziness  Check MRI given his risk factors.  I suspect due to his rapid response on his A. fib.  See below.  - MR Brain Without Contrast; Future    3. Longstanding persistent atrial fibrillation  We will add back diltiazem for better rate control.  He did discontinue that due to cost concerns.  If his dizziness does not get better he will let me know.  - HM2(CBC w/o Differential)  - diltiazem (CARDIZEM CD) 120 MG 24 hr capsule; Take 1 capsule (120 mg total) by mouth daily.  Dispense: 30 capsule; Refill: 12    4. Avascular Necrosis  Status post hip arthroplasties.    5. Chronic Cutaneous Ulcer Venous Stasis  Continue compression.  Topical therapies per dermatology wound care.    6. Essential hypertension, benign  Uncontrolled.  Diltiazem should improve this as well.    7. Closed fracture of right hip with routine healing, subsequent encounter  I think patient should likely get treated for his low bone mass given his recent fracture.  We will have him meet with osteoporosis specialist as he does have a complicated history and also is going to require significant dental work in the near future.    8. History of DVT (deep vein thrombosis)    - INR    9. Macrocytosis  Recheck blood counts today.  Etiology unclear.  Possibly alcohol use in the past.    10. Mixed hyperlipidemia    - Lipid Cascade    11. Prediabetes  Labs today for monitoring.  - Glycosylated Hemoglobin A1c  - Comprehensive Metabolic Panel  - Urinalysis-UC if Indicated  - Lipid Cascade    12. Tobacco abuse  Urged  cessation.    13. Age-related osteoporosis with current pathological fracture with routine healing  Should likely get on medications.  Complicated gentleman with multiple medical problems and not in need of significant dental work.  Will get opinion from osteoporosis care.  - Ambulatory referral to Osteoporosis Care    14. Generalized osteoarthritis  Renewed his Celebrex today.  - celecoxib (CELEBREX) 200 MG capsule; Take 1 capsule (200 mg total) by mouth daily.  Dispense: 30 capsule; Refill: 2    15. Screen for colon cancer  Due for his colonoscopy.  - Ambulatory referral for Colonoscopy        Return in about 4 weeks (around 2/4/2020) for Recheck.     Chief Complaint   Chief Complaint   Patient presents with     Annual Exam     Pt is fasting        Patient Profile   Social History     Social History Narrative    Lives with his wife        Past Medical History   Patient Active Problem List   Diagnosis     Tobacco abuse     Avascular Necrosis     Mixed hyperlipidemia     Chronic Cutaneous Ulcer Venous Stasis     Pulmonary embolism (H)     Atrial Fibrillation     Male Erectile Disorder     Memory Lapses Or Loss     Abnormal Weight Loss     History of DVT (deep vein thrombosis)     Hip fracture (H)     Macrocytosis     Prediabetes     Essential hypertension, benign     Age-related osteoporosis with current pathological fracture with routine healing       Past Surgical History  He has a past surgical history that includes Hernia repair (2012); Joint replacement (Bilateral, 2002); Joint replacement (2007); and Eye surgery (1972).     History of Present Illness   This 59 y.o. old Facundo is a extremely complicated poorly compliant gentleman who has a extensive past medical history.  He has ongoing tobacco abuse.  He just is back on insurance now after a few months of being off due to a hip fracture.  He has a history of avascular neutral necrosis of his hips and is status post bio hip arthroplasties.  He took a fall this  fall and fractured his femur.  He has known osteopenia.  He now I think warrants to have treatment for osteoporosis given this fracture.  He complains of dizziness or feeling of being offkilter.  No falls.  No actual room spinning or vertigo type symptoms.  He wants to get back on Celebrex for his generalized aches and pains from DJD.  He remains on warfarin for his history of DVT and PE as well as A. fib.  His memory has been stable he tells me.  He has chronic venous stasis and venous stasis ulcers.  He only has a couple open areas he tells me.  He has been compliant with compression.  He is only drinking 2 or 3 beers a week he tells me.  His wife is been falling a lot.  She is an alcoholic.    Review of Systems: A comprehensive review of systems was negative except as noted.     Medications and Allergies   Current Outpatient Medications   Medication Sig Dispense Refill     lisinopril (PRINIVIL,ZESTRIL) 5 MG tablet Take 1 tablet (5 mg total) by mouth daily. 90 tablet 0     metoprolol succinate (TOPROL-XL) 200 MG 24 hr tablet Take 1 tablet (200 mg total) by mouth daily. 90 tablet 0     rosuvastatin (CRESTOR) 10 MG tablet TAKE 1 TABLET BY MOUTH EVERYDAY AT BEDTIME 90 tablet 1     warfarin (COUMADIN/JANTOVEN) 5 MG tablet Take ONE tablet (5mg) daily by mouth, as directed.  Adjust dose based on INR results. 100 tablet 1     blood pressure test kit-medium Kit Check blood pressure and heart rate once daily. 1 each 0     celecoxib (CELEBREX) 200 MG capsule Take 1 capsule (200 mg total) by mouth daily. 30 capsule 2     diltiazem (CARDIZEM CD) 120 MG 24 hr capsule Take 1 capsule (120 mg total) by mouth daily. 30 capsule 12     Current Facility-Administered Medications   Medication Dose Route Frequency Provider Last Rate Last Dose     lidocaine 2 % jelly (XYLOCAINE)   Topical PRN Nany Levine, CNP   1 application at 12/28/16 1538     Allergies   Allergen Reactions     Pravastatin      Memory changes, feels foggy          Family and Social History   Family History   Problem Relation Age of Onset     Diabetes Mother      Heart disease Mother      Obesity Mother      Heart disease Father      Varicose Veins Father      No Medical Problems Daughter         Social History     Tobacco Use     Smoking status: Current Every Day Smoker     Smokeless tobacco: Current User     Tobacco comment: stopped x 3 months ago   Substance Use Topics     Alcohol use: Yes     Alcohol/week: 2.0 standard drinks     Types: 2 Cans of beer per week     Comment: drinks 1-2 times per week.     Drug use: No        Physical Exam   General Appearance:   Somewhat chronically ill-appearing gentleman who smells of cigarette smoke.    BP (!) 132/98   Pulse 88   Ht 6' (1.829 m)   Wt 180 lb (81.6 kg)   SpO2 98%   BMI 24.41 kg/m      EYES: Eyelids, conjunctiva, and sclera were normal.   HEAD, EARS, NOSE, MOUTH, AND THROAT: Head and face were normal. Hearing was normal to voice and the ears were normal to external exam. Nose appearance was normal and there was no discharge. Oropharynx was normal.  Dentition is extremely poor.  NECK: Neck appearance was normal. There were no neck masses and the thyroid was not enlarged.  RESPIRATORY: Breathing pattern was normal and the chest moved symmetrically.  Percussion/auscultatory percussion was normal.  Lung sounds were normal and there were no abnormal sounds.  CARDIOVASCULAR: Heart rate and rhythm were normal.  S1 and S2 were normal and there were no extra sounds or murmurs. Peripheral pulses in arms and legs were normal.  Jugular venous pressure was normal.  There was no peripheral edema.  GASTROINTESTINAL: The abdomen was normal in contour.  Bowel sounds were present.  Percussion detected no organ enlargement or tenderness.  Palpation detected no tenderness, mass, or enlarged organs.   MUSCULOSKELETAL: Skeletal configuration was normal and muscle mass was normal for age. Joint appearance was overall normal.  LYMPHATIC:  There were no enlarged nodes.  SKIN/HAIR/NAILS: Skin color was normal.  There were no skin lesions.  Hair and nails were normal.  NEUROLOGIC: The patient was alert and oriented to person, place, time, and circumstance. Speech was normal. Cranial nerves were normal. Motor strength was normal for age. The patient was normally coordinated.  PSYCHIATRIC:  Mood and affect were normal and the patient had normal recent and remote memory. The patient's judgment and insight were normal.    ADDITIONAL VITAL SIGNS: None  CHEST WALL/BREASTS: Normal breast.  RECTAL: Normal tone and normal prostate.  GENITAL/URINARY: Normal penis and testes.     Additional Information        Ronald Webb MD  Internal Medicine  Contact me at [phone]

## 2021-06-05 NOTE — TELEPHONE ENCOUNTER
----- Message from Ronald Webb MD sent at 1/8/2020  6:56 PM CST -----  Please call pt and send letter:    Potassium was a little high again.  I am hoping this was just another fluke since it was the end of the day.  Please watch the potassium in the diet and I will recheck this again at your next visit.  Cholesterol was high.  Please make sure you are taking your medications on a daily basis.    Red blood cell count is normal as is white blood cell count, but your platelet count is quite low again.  It was like that four years ago.  I will do some additional lab work when you are in again, and I would like to do an ultrasound of your liver and spleen in the meantime as well.  Average sugar looks stable.  Urine looks okay.    Claudette I will place orders.  Please send low potassium diet information.

## 2021-06-05 NOTE — TELEPHONE ENCOUNTER
ANTICOAGULATION  MANAGEMENT PROGRAM    Damien Munroeh is overdue for INR check.     Left message to check INR at upcoming office visit on 2/11/2020      Rosa Maria Winkler RN

## 2021-06-05 NOTE — TELEPHONE ENCOUNTER
Refill Approved    Rx renewed per Medication Renewal Policy. Medication was last renewed on 11/2/19.    Oliva Colon, [facility] Triage/Med Refill 1/24/2020     Requested Prescriptions   Pending Prescriptions Disp Refills     metoprolol succinate (TOPROL-XL) 200 MG 24 hr tablet [Pharmacy Med Name: METOPROLOL SUCC  MG TAB] 90 tablet 0     Sig: TAKE 1 TABLET BY MOUTH EVERY DAY       Beta-Blockers Refill Protocol Passed - 1/24/2020  7:31 AM        Passed - PCP or prescribing provider visit in past 12 months or next 3 months     Last office visit with prescriber/PCP: 11/11/2019 Ronald Webb MD OR same dept: 11/11/2019 Ronald Webb MD OR same specialty: 11/11/2019 Ronald Webb MD  Last physical: 1/7/2020 Last MTM visit: Visit date not found   Next visit within 3 mo: Visit date not found  Next physical within 3 mo: Visit date not found  Prescriber OR PCP: Ronald Webb MD  Last diagnosis associated with med order: 1. Persistent atrial fibrillation  - metoprolol succinate (TOPROL-XL) 200 MG 24 hr tablet [Pharmacy Med Name: METOPROLOL SUCC  MG TAB]; TAKE 1 TABLET BY MOUTH EVERY DAY  Dispense: 90 tablet; Refill: 0    If protocol passes may refill for 12 months if within 3 months of last provider visit (or a total of 15 months).             Passed - Blood pressure filed in past 12 months     BP Readings from Last 1 Encounters:   01/07/20 (!) 132/98

## 2021-06-05 NOTE — TELEPHONE ENCOUNTER
RN cannot approve Refill Request    RN can NOT refill this medication med is not covered by policy/route to provider. Last office visit: 11/11/2019 Ronald Webb MD Last Physical: 1/7/2020 Last MTM visit: Visit date not found Last visit same specialty: 11/11/2019 Ronald Webb MD.  Next visit within 3 mo: Visit date not found  Next physical within 3 mo: Visit date not found      Muna Patel, [facility] Triage/Med Refill 1/22/2020    Requested Prescriptions   Pending Prescriptions Disp Refills     warfarin ANTICOAGULANT (COUMADIN/JANTOVEN) 5 MG tablet [Pharmacy Med Name: WARFARIN SODIUM 5 MG TABLET] 100 tablet 0     Sig: TAKE 1 TAB BY MOUTH AS DIRECTED. ADJUST DOSE BASE ON INR.       Warfarin Refill Protocol  Failed - 1/22/2020  7:32 AM        Failed -  Route to appropriate pool/provider     Last Anticoagulation Summary:   Anticoagulation Episode Summary     Current INR goal:   2.0-3.0   TTR:   47.5 % (11.7 mo)   Next INR check:   1/28/2020   INR from last check:   2.50 (1/7/2020)   Weekly max warfarin dose:      Target end date:      INR check location:      Preferred lab:      Send INR reminders to:   [facility]    Indications    History of DVT (deep vein thrombosis) [Z86.718]           Comments:            Anticoagulation Care Providers     Provider Role Specialty Phone number    Ronald Webb MD Referring Internal Medicine [phone]                Passed - Provider visit in last year     Last office visit with prescriber/PCP: 11/11/2019 Ronald Webb MD OR same dept: 11/11/2019 Ronald Webb MD OR same specialty: 11/11/2019 Ronald Webb MD  Last physical: 1/7/2020 Last MTM visit: Visit date not found    Next appt within 3 mo: Visit date not found Next physical within 3 mo: Visit date not found  Prescriber OR PCP: Ronald Webb MD  Last diagnosis associated with med order: 1. History of DVT (deep vein thrombosis)  - warfarin ANTICOAGULANT (COUMADIN/JANTOVEN) 5 MG tablet  [Pharmacy Med Name: WARFARIN SODIUM 5 MG TABLET]; TAKE 1 TAB BY MOUTH AS DIRECTED. ADJUST DOSE BASE ON INR.  Dispense: 100 tablet; Refill: 0    2. Atrial fibrillation (H)  - warfarin ANTICOAGULANT (COUMADIN/JANTOVEN) 5 MG tablet [Pharmacy Med Name: WARFARIN SODIUM 5 MG TABLET]; TAKE 1 TAB BY MOUTH AS DIRECTED. ADJUST DOSE BASE ON INR.  Dispense: 100 tablet; Refill: 0    3. Post Septic Embolism  - warfarin ANTICOAGULANT (COUMADIN/JANTOVEN) 5 MG tablet [Pharmacy Med Name: WARFARIN SODIUM 5 MG TABLET]; TAKE 1 TAB BY MOUTH AS DIRECTED. ADJUST DOSE BASE ON INR.  Dispense: 100 tablet; Refill: 0    4. Long term current use of anticoagulant therapy  - warfarin ANTICOAGULANT (COUMADIN/JANTOVEN) 5 MG tablet [Pharmacy Med Name: WARFARIN SODIUM 5 MG TABLET]; TAKE 1 TAB BY MOUTH AS DIRECTED. ADJUST DOSE BASE ON INR.  Dispense: 100 tablet; Refill: 0    If protocol passes may refill for 6 months if within 3 months of last provider visit (or a total of 9 months).

## 2021-06-05 NOTE — TELEPHONE ENCOUNTER
I called and left detailed message with results/recommendations on personal vm. I will also mail out lab results & potassium diet information to home address. Advised patient to rtc with any further questions or concerns.    According to appt desk patient is already scheduled for his ultrasound on 1/15/20.

## 2021-06-05 NOTE — TELEPHONE ENCOUNTER
ANTICOAGULATION  MANAGEMENT    Assessment     Today's INR result of 2.5 is Therapeutic (goal INR of 2.0-3.0)        Warfarin taken as previously instructed    No new diet changes affecting INR    No new medication/supplements affecting INR    Continues to tolerate warfarin with no reported s/s of bleeding or thromboembolism     Previous INR was Supratherapeutic    Plan:     Left a detailed message for Damien regarding INR result and instructed:     Warfarin Dosing Instructions:  Continue current warfarin dose 5 mg daily     Instructed patient to follow up no later than: 2-3 weeks    Education provided: target INR goal and significance of current INR result    Instructed to call the [facility] for any changes, questions or concerns. (#[phone])   ?   Leonor Whitfield RN    Subjective/Objective:      Damien RAMIREZ Hakan, a 59 y.o. male is on warfarin.     Damien reports:     Home warfarin dose: as updated on anticoagulation calendar per template     Missed doses: No     Medication changes:  No     S/S of bleeding or thromboembolism:  No     New Injury or illness:  No     Changes in diet or alcohol consumption:  No     Upcoming surgery, procedure or cardioversion:  No    Anticoagulation Episode Summary     Current INR goal:   2.0-3.0   TTR:   47.9 % (1 y)   Next INR check:   1/28/2020   INR from last check:   2.50 (1/7/2020)   Weekly max warfarin dose:      Target end date:      INR check location:      Preferred lab:      Send INR reminders to:   [facility]    Indications    History of DVT (deep vein thrombosis) [Z86.718]           Comments:            Anticoagulation Care Providers     Provider Role Specialty Phone number    Ronald Webb MD Referring Internal Medicine [phone]

## 2021-06-06 NOTE — TELEPHONE ENCOUNTER
ANTICOAGULATION  MANAGEMENT PROGRAM    Damien Mcclendon is overdue for INR check.     Left message to call and schedule INR appointment as soon as possible.      Rosa Maria Winkler RN

## 2021-06-06 NOTE — TELEPHONE ENCOUNTER
ANTICOAGULATION  MANAGEMENT    Assessment     Today's INR result of 2.40 is Therapeutic (goal INR of 2.0-3.0)        Warfarin taken as previously instructed    No new diet changes affecting INR    Potential interaction between Crestor and warfarin which may affect subsequent INRs    Continues to tolerate warfarin with no reported s/s of bleeding or thromboembolism     Previous INR was Therapeutic at 2.50 on 1/7/2020    Plan:     Spoke with Damien regarding INR result and instructed:     Warfarin Dosing Instructions:  (has 5mg tabs)   - Continue current warfarin dose 5 mg daily.    Instructed patient to follow up no later than:  2-3 wks.    Education provided: importance of consistent vitamin K intake and target INR goal and significance of current INR result    Damien verbalizes understanding and agrees to warfarin dosing plan.    Instructed to call the [facility] for any changes, questions or concerns. (#[phone])   ?   Rosa Maria Winkler RN    Subjective/Objective:      Damiengasper Mcclendon, a 59 y.o. male is on warfarin.     Damien reports:     Home warfarin dose: as updated on anticoagulation calendar per template     Missed doses: No     Medication changes:  Yes.  Will be starting generic Crestor.   - per [employer], Concurrent use of ROSUVASTATIN and WARFARIN may result in increase in international normalized ratio (INR) and increased risk of bleeding.        S/S of bleeding or thromboembolism:  No     New Injury or illness:  No     Changes in diet or alcohol consumption:  No     Upcoming surgery, procedure or cardioversion:  No    Anticoagulation Episode Summary     Current INR goal:   2.0-3.0   TTR:   49.7 % (1 y)   Next INR check:   3/4/2020   INR from last check:   2.40 (2/12/2020)   Weekly max warfarin dose:      Target end date:      INR check location:      Preferred lab:      Send INR reminders to:   [facility]    Indications    History of DVT (deep vein thrombosis) [Z86.718]            Comments:            Anticoagulation Care Providers     Provider Role Specialty Phone number    Ronald Webb MD Referring Internal Medicine [phone]

## 2021-06-06 NOTE — TELEPHONE ENCOUNTER
----- Message from Ronald Webb MD sent at 2/18/2020  5:05 PM CST -----  Please call pt:    Your blood looks like you could be deficient in vitamin B12 or folic acid.  I ordered those tests but they were not completed for some reason.  We will need to make sure that we get those done.  If you are not deficient in these vitamins, I will need you to meet with a blood specialist to determine why you have these changes to your blood cells.      Claudette please have them add the labs that were ordered last month.  If they are unable to add he will need to be drawn again and at no charge.  Please notify management.

## 2021-06-06 NOTE — TELEPHONE ENCOUNTER
Type of referral:   The referral came with first name and last name as same.   Also the [facility] has a different address.    Is the  address on chart correct?     What provider or facility are you being referred to?  [facility]  Do you have an appointment scheduled?  No  What is your question? See above  Okay to leave a detailed message: Lizz Flores did not ask for call back.

## 2021-06-06 NOTE — TELEPHONE ENCOUNTER
Left message with results/recommendations below per PCP. Advised patient to rtc with any further questions or concerns.     Advised patient to get lab work (folic acid & vitamin b12 levels) check today as scheduled per our phone conversation yesterday.

## 2021-06-06 NOTE — PROGRESS NOTES
Office Visit - Follow Up   Damien Mcclendon   59 y.o. male    Date of Visit: 2/12/2020    Chief Complaint   Patient presents with     Atrial Fibrillation     1 mo f/u - need INR recheck      Hyperlipidemia     ? if patient should be taking Crestor (too expensive)        Assessment and Plan   1. Atrial fibrillation with rapid ventricular response (H)  EKG was done today and confirms good rate control.  Asymptomatic.  Continue same regimen.  - Electrocardiogram Perform - Clinic    2. Essential hypertension, benign  Blood pressures much better with current regimen.  Continue same.    3. Dizziness  This dizziness and unsteadiness I think it is more neurologic than anything else.  We discussed Osmani's doing another Holter but he is not any better with better rate control and with the gait abnormalities I think it is more of a neurologic issue.  We will have him meet with neurology.  - Ambulatory referral to Neurology    4. Unsteady gait  As above.  - Ambulatory referral to Neurology    5. Tobacco abuse  Counseled patient that its imperative he give up the cigarettes.    6. Prediabetes  Continue close follow-up of his sugars.  - Basic Metabolic Panel    7. Macrocytosis  Recheck blood counts today.  Low threshold to refer to hematology.  - HM1(CBC and Differential)  - Morphology, Path Smear Review (MORP)  - HM1 (CBC with Diff)    8. Thrombocytopenia (H)  As above.    9. Mixed hyperlipidemia  Unclear why he stopped the rosuvastatin but I suspect it was well he did not have medication coverage.  We will get him back on this as he really needs to be on a statin for primary prevention.  - rosuvastatin (CRESTOR) 10 MG tablet; TAKE 1 TABLET BY MOUTH EVERYDAY AT BEDTIME  Dispense: 90 tablet; Refill: 3    10. History of DVT (deep vein thrombosis)    - INR        Return in about 5 months (around 7/12/2020).     History of Present Illness   This 59 y.o. old very complicated Barron with multi-medical problems comes in today for  "follow-up.  His rate was poorly controlled for his A. fib at our last visit.  I added back diltiazem as he had not been taking it due to cost concerns.  Today he tells me is not taking his Crestor.  Its unclear why.  Its unclear when he was last took it as I have not filled it since last year about this time.  He continues to have his \"dizziness\".  When asking more about this he tells me it is more and unsteadiness when he walks and he almost falls over at times when he is walking.  He did have an MRI because of some memory issues which showed atrophy and small vessel ischemic changes.  No evidence of CVA.  He continues to smoke and is not really ready to give that up.  He tells me he is only drinking a couple beers a week.  His most recent labs showed a new thrombocytopenia of unknown etiology.  I did do ultrasound of his abdomen which showed no splenomegaly but hepatic fatty liver.    Review of Systems: A comprehensive review of systems was negative except as noted.     Medications, Allergies and Problem List   Reviewed, reconciled and updated  Post Discharge Medication Reconciliation Status:      Physical Exam   General Appearance:   Pleasant gentleman.  Somewhat chronically ill appearing.  Heart is irregularly irregular.  He is unable to walk heel toe without fear of falling.  Very unsteady.    /78 (Patient Site: Right Arm, Patient Position: Sitting, Cuff Size: Adult Regular)   Pulse 72   Ht 6' (1.829 m)   Wt 182 lb (82.6 kg)   SpO2 96%   BMI 24.68 kg/m           Additional Information   Current Outpatient Medications   Medication Sig Dispense Refill     blood pressure test kit-medium Kit Check blood pressure and heart rate once daily. 1 each 0     celecoxib (CELEBREX) 200 MG capsule Take 1 capsule (200 mg total) by mouth daily. 30 capsule 2     diltiazem (CARDIZEM CD) 120 MG 24 hr capsule Take 1 capsule (120 mg total) by mouth daily. 30 capsule 12     lisinopril (PRINIVIL,ZESTRIL) 5 MG tablet Take 1 " tablet (5 mg total) by mouth daily. 90 tablet 0     metoprolol succinate (TOPROL-XL) 200 MG 24 hr tablet Take 1 tablet (200 mg total) by mouth daily. 90 tablet 3     rosuvastatin (CRESTOR) 10 MG tablet TAKE 1 TABLET BY MOUTH EVERYDAY AT BEDTIME 90 tablet 3     warfarin ANTICOAGULANT (COUMADIN/JANTOVEN) 5 MG tablet Take 1 tablet (5mg) by mouth daily, as directed.  Adjust dose based on INR results. 100 tablet 1     Current Facility-Administered Medications   Medication Dose Route Frequency Provider Last Rate Last Dose     lidocaine 2 % jelly (XYLOCAINE)   Topical PRN Nany Levine, CNP   1 application at 12/28/16 1531     Allergies   Allergen Reactions     Pravastatin      Memory changes, feels foggy      Social History     Tobacco Use     Smoking status: Current Every Day Smoker     Smokeless tobacco: Current User     Tobacco comment: stopped x 3 months ago   Substance Use Topics     Alcohol use: Yes     Alcohol/week: 2.0 standard drinks     Types: 2 Cans of beer per week     Comment: drinks 1-2 times per week.     Drug use: No       Review and/or order of clinical lab tests:  Review and/or order of radiology tests:  Review and/or order of medicine tests:  Discussion of test results with performing physician:  Decision to obtain old records and/or obtain history from someone other than the patient:  Review and summarization of old records and/or obtaining history from someone other than the patient and.or discussion of case with another health care provider:  Independent visualization of image, tracing or specimen itself:    Time: not applicable     Ronald Webb MD

## 2021-06-06 NOTE — TELEPHONE ENCOUNTER
Orders being requested:   4 Day Hold orders for Warfarin  Bridging orders if needed  Reason service is needed/diagnosis:   Colonoscopy on 3/11/2020  When are orders needed by: Asap  Where to send Orders: Phone:  Verbal orders to Raz with [facility], [phone], Ext: [phone]  Okay to leave detailed message?  Yes    Please leave information:  Provider name  Patient name and   Hold Medication name  Bridge medication name

## 2021-06-06 NOTE — TELEPHONE ENCOUNTER
Refill Approved    Rx renewed per Medication Renewal Policy. Medication was last renewed on 11/2/19.    Homa Cho, [facility] Triage/Med Refill 2/24/2020     Requested Prescriptions   Pending Prescriptions Disp Refills     lisinopriL (PRINIVIL,ZESTRIL) 5 MG tablet [Pharmacy Med Name: LISINOPRIL 5 MG TABLET] 30 tablet 2     Sig: TAKE 1 TABLET BY MOUTH EVERY DAY       [facility] [employer] Refill Protocol Passed - 2/21/2020  7:32 AM        Passed - PCP or prescribing provider visit in past 12 months       Last office visit with prescriber/PCP: 2/12/2020 Ronald Webb MD OR same dept: 2/12/2020 Ronald Webb MD OR same specialty: 2/12/2020 Ronald Webb MD  Last physical: 1/7/2020 Last MTM visit: Visit date not found   Next visit within 3 mo: Visit date not found  Next physical within 3 mo: Visit date not found  Prescriber OR PCP: Ronald Webb MD  Last diagnosis associated with med order: 1. Type 2 diabetes mellitus (H)  - lisinopriL (PRINIVIL,ZESTRIL) 5 MG tablet [Pharmacy Med Name: LISINOPRIL 5 MG TABLET]; TAKE 1 TABLET BY MOUTH EVERY DAY  Dispense: 30 tablet; Refill: 2    If protocol passes may refill for 12 months if within 3 months of last provider visit (or a total of 15 months).             Passed - Serum Potassium in past 12 months     Lab Results   Component Value Date    Potassium 4.7 02/12/2020             Passed - Blood pressure filed in past 12 months     BP Readings from Last 1 Encounters:   02/12/20 112/78             Passed - Serum Creatinine in past 12 months     Creatinine   Date Value Ref Range Status   02/12/2020 0.82 0.70 - 1.30 mg/dL Final

## 2021-06-07 NOTE — TELEPHONE ENCOUNTER
RN cannot approve Refill Request    RN can NOT refill this medication med is not covered by policy/route to provider. Last office visit: 2/12/2020 Ronald Webb MD Last Physical: 1/7/2020 Last MTM visit: Visit date not found Last visit same specialty: 2/12/2020 Ronald Webb MD.  Next visit within 3 mo: Visit date not found  Next physical within 3 mo: Visit date not found      Ricarda Landeros, [facility] Triage/Med Refill 3/30/2020    Requested Prescriptions   Pending Prescriptions Disp Refills     celecoxib (CELEBREX) 200 MG capsule [Pharmacy Med Name: CELECOXIB 200 MG CAPSULE] 30 capsule 2     Sig: TAKE 1 CAPSULE BY MOUTH EVERY DAY       There is no refill protocol information for this order

## 2021-06-07 NOTE — TELEPHONE ENCOUNTER
ANTICOAGULATION  MANAGEMENT PROGRAM    Damien Mcclendon is overdue for INR check.     Spoke with Damine - he reported getting an INR on 3/11/20 thru JAMAR Winkler RN

## 2021-06-08 NOTE — TELEPHONE ENCOUNTER
ANTICOAGULATION  MANAGEMENT PROGRAM    Damien Mcclendon is overdue for INR check.     Spoke with Damien and scheduled INR appointment on 5/26/20 @ [facility].      Rosa Maria Winkler RN

## 2021-06-08 NOTE — PROGRESS NOTES
Date of Service:1/4/2017    Chief Complaint: No chief complaint on file.      History:    Jerald presents to clinic for reevaluation of his lower legs.  He left the 2-layer wrap on his left leg for the week, but reports that it was very itchy.  To his right leg, he is applying clobetasol daily and the rash is much better.    Current Outpatient Prescriptions   Medication Sig Dispense Refill     atenolol (TENORMIN) 50 MG tablet TAKE 1 TABLET (50 MG TOTAL) BY MOUTH DAILY. 90 tablet 1     clobetasol (TEMOVATE) 0.05 % cream Apply topically daily for 7 days. 60 g 1     ibuprofen (ADVIL,MOTRIN) 200 MG tablet Take 200 mg by mouth every 6 (six) hours as needed for pain.       lisinopril (PRINIVIL,ZESTRIL) 5 MG tablet TAKE 1 TABLET (5 MG TOTAL) BY MOUTH DAILY. 90 tablet 1     nicotine polacrilex (NICORETTE) 4 MG gum Apply 1 each (4 mg total) to the mouth or throat as needed for smoking cessation. 100 each 3     varenicline (CHANTIX STARTING MONTH BOX) 0.5 mg (11)- 1 mg (42) tablet Give with meals and with a full glass of water. 53 tablet 0     warfarin (COUMADIN) 5 MG tablet TAKE 1 AND 1/2 TABLETS BY MOUTH MONDAY & THURSDAY AND TAKE 1 TABLET ON ALL OTHER DAYS 120 tablet 0     Current Facility-Administered Medications   Medication Dose Route Frequency Provider Last Rate Last Dose     lidocaine 2 % jelly (XYLOCAINE)   Topical PRN Nany Levine, CNP   1 application at 12/28/16 1531       No Known Allergies    Physical Exam:    Vitals:    01/04/17 0830   BP: 98/64   Pulse: 76   Resp: 16   Temp: 98.1  F (36.7  C)    There is no height or weight on file to calculate BMI.    General:  56 y.o. male in no apparent distress.    Psychiatric:  Alert and oriented x 3.  Cooperative.   Integumentary:  Skin is uniformly warm, dry and pink.  Lower extremity edema: none on right, slightly firm on left.  Ulcerations:  none      Vasc Edema 12/28/2016 1/4/2017   Right just above MTP 23.8 23   Right Ankle 24.0 25   Right Widest Calf 35.0 34    Right Thigh Up 10cm 40 39.5   Left - just above MTP 22.8 23   Left Ankle 26.3 25   Left Widest Calf 37.9 36.5   Left Thigh Up 10cm 40.5 43     Assessment:  1. Chronic Cutaneous Ulcer Venous Stasis     2. Nicotine Dependence     3. History of DVT (deep vein thrombosis)     4. Venous dermatitis         A new wound was identified today: no.    Plan:  1.   Venous Insufficieny Ulceration, resolved    2.    Dermatitis, improved     3.   Treatment:   He will apply the clobetasol daily to both legs and wear a double layer of Tubigrip on his legs.    4.   Patient will follow up with me in one week  for reevaluation.      Nany Levine, APRN, CNP,  [facility]  [phone]

## 2021-06-08 NOTE — PROGRESS NOTES
Date of Service:1/18/2017    Chief Complaint:   Chief Complaint   Patient presents with     Follow-up     bilat Edema with weeping       History:    Jerald presents to clinic for reevaluation of his lower leg ulcers.  He is tolerating his 2-layer wraps without difficulty.    Current Outpatient Prescriptions   Medication Sig Dispense Refill     atenolol (TENORMIN) 50 MG tablet TAKE 1 TABLET (50 MG TOTAL) BY MOUTH DAILY. 90 tablet 1     ibuprofen (ADVIL,MOTRIN) 200 MG tablet Take 200 mg by mouth every 6 (six) hours as needed for pain.       lisinopril (PRINIVIL,ZESTRIL) 5 MG tablet TAKE 1 TABLET (5 MG TOTAL) BY MOUTH DAILY. 90 tablet 1     nicotine polacrilex (NICORETTE) 4 MG gum Apply 1 each (4 mg total) to the mouth or throat as needed for smoking cessation. 100 each 3     varenicline (CHANTIX STARTING MONTH BOX) 0.5 mg (11)- 1 mg (42) tablet Give with meals and with a full glass of water. 53 tablet 0     warfarin (COUMADIN) 5 MG tablet TAKE 1 AND 1/2 TABLETS BY MOUTH MONDAY & THURSDAY AND TAKE 1 TABLET ON ALL OTHER DAYS 120 tablet 0     Current Facility-Administered Medications   Medication Dose Route Frequency Provider Last Rate Last Dose     lidocaine 2 % jelly (XYLOCAINE)   Topical PRN Nany Levine, CNP   1 application at 12/28/16 1531       No Known Allergies    Physical Exam:    Vitals:    01/18/17 1100   BP: 108/80   Pulse: 76   Resp: 18   Temp: 98.9  F (37.2  C)    There is no height or weight on file to calculate BMI.    General:  56 y.o. male in no apparent distress.    Psychiatric:  Alert and oriented x 3.  Cooperative.   Integumentary:  Skin is uniformly warm, dry and pink.  Lower extremity edema: none  Ulcerations:  none .     Vasc Edema 12/28/2016 1/4/2017 1/11/2017 1/18/2017   Right just above MTP 23.8 23 23 23   Right Ankle 24.0 25 23.3 23.5   Right Widest Calf 35.0 34 34.5 33   Right Thigh Up 10cm 40 39.5 - -   Left - just above MTP 22.8 23 23 22.5   Left Ankle 26.3 25 24.7 24.3   Left Widest  Calf 37.9 36.5 36.2 34.7   Left Thigh Up 10cm 40.5 43 - -     Assessment:  1. Venous hypertension, chronic, bilateral  Compression stockings   2. Venous stasis dermatitis of both lower extremities         A new wound was identified today: no.    Plan:  1.   Venous insufficiency, stable.  A prescription for compression stockings was provided.  He will wear these daily.    2.  Venous dermatitis, improved.  He will stop clobetasol and start Aquaphor daily.     2.   Treatment: He will obtain compression stockings and wear them daily.    4.   Patient will follow up with me in one month  for reevaluation.      Nany Levine, APRN, CNP,  [facility]  [phone]

## 2021-06-08 NOTE — TELEPHONE ENCOUNTER
ANTICOAGULATION  MANAGEMENT PROGRAM    Damien Mcclendon is overdue for INR check.     First reminder letter sent.       Leonor Whitfield RN

## 2021-06-08 NOTE — PROGRESS NOTES
Date of Service:1/11/2017    Chief Complaint:   Chief Complaint   Patient presents with     Follow-up     bilat LE edema with ulcers       History:    Jerald presents to clinic for reevaluation of his lower legs.  He is using clobetasol to his left leg and it is helping the dermatitis a lot.  He is wearing his double layer of tubigrip for compression.    Current Outpatient Prescriptions   Medication Sig Dispense Refill     atenolol (TENORMIN) 50 MG tablet TAKE 1 TABLET (50 MG TOTAL) BY MOUTH DAILY. 90 tablet 1     ibuprofen (ADVIL,MOTRIN) 200 MG tablet Take 200 mg by mouth every 6 (six) hours as needed for pain.       lisinopril (PRINIVIL,ZESTRIL) 5 MG tablet TAKE 1 TABLET (5 MG TOTAL) BY MOUTH DAILY. 90 tablet 1     nicotine polacrilex (NICORETTE) 4 MG gum Apply 1 each (4 mg total) to the mouth or throat as needed for smoking cessation. 100 each 3     varenicline (CHANTIX STARTING MONTH BOX) 0.5 mg (11)- 1 mg (42) tablet Give with meals and with a full glass of water. 53 tablet 0     warfarin (COUMADIN) 5 MG tablet TAKE 1 AND 1/2 TABLETS BY MOUTH MONDAY & THURSDAY AND TAKE 1 TABLET ON ALL OTHER DAYS 120 tablet 0     Current Facility-Administered Medications   Medication Dose Route Frequency Provider Last Rate Last Dose     lidocaine 2 % jelly (XYLOCAINE)   Topical PRN Nany Levine, CNP   1 application at 12/28/16 1531       No Known Allergies    Physical Exam:    Vitals:    01/11/17 1527   BP: 120/80   Pulse: 64   Resp: 18   Temp: 98.9  F (37.2  C)    There is no height or weight on file to calculate BMI.    General:  56 y.o. male in no apparent distress.    Psychiatric:  Alert and oriented x 3.  Cooperative.   Integumentary:  Skin is uniformly warm, dry and pink.  Lower extremity edema: firm  Ulcerations:  There is no carmencita wound erythema, induration, maceration, denudement, fluctuance or warmth surrounding the ulcer(s).     Vasc Edema 12/28/2016 1/4/2017 1/11/2017   Right just above MTP 23.8 23 23   Right  Ankle 24.0 25 23.3   Right Widest Calf 35.0 34 34.5   Right Thigh Up 10cm 40 39.5 -   Left - just above MTP 22.8 23 23   Left Ankle 26.3 25 24.7   Left Widest Calf 37.9 36.5 36.2   Left Thigh Up 10cm 40.5 43 -       Assessment:  1. Chronic Cutaneous Ulcer Venous Stasis     2. Venous stasis dermatitis of both lower extremities     3. History of DVT (deep vein thrombosis)         A new wound was identified today: no.    Plan:  1.  Venous insufficiency, edema has not improved.      2.  Venous dermatitis, improved.    3..  Treatment:   A 2-layer wrap will be applied to both legs to reduce the edema.  At his next visit, he will be measured for compression stockings.    4.   Patient will follow up with me in one week  for reevaluation.      Nany Levine, APRN, CNP,  [facility]  [phone]

## 2021-06-08 NOTE — PROGRESS NOTES
Office Visit - Follow Up   Damien Mcclendon   56 y.o. male    Date of Visit: 1/26/2017    Chief Complaint   Patient presents with     Follow-up     stopped smoking     INR Check     Injections     Hep B and td        Assessment and Plan   1. Nicotine Dependence  He is now off the Celexa I congratulated him at length.  We'll continue the Chantix for another couple months and I'll see him after he is off of it.  If he continues to smoke her start smoking on the Chantix I have urged him to just discontinue it.    2. History of DVT (deep vein thrombosis)  INR today.  - INR    3. Chronic Cutaneous Ulcer Venous Stasis  He notes good improvement of his foot ulcer.  He'll continue to follow with the vascular clinic.    4. Atrial fibrillation, unspecified type  Rate is a little fast today.  He did take his atenolol.  I suspect this is due to deconditioning and walking over here.    5. A-fib    - INR        Return in about 3 months (around 4/26/2017) for Recheck.     History of Present Illness   This 56 y.o. old patient comes in for follow-up of his tobacco abuse and other issues.  He went on Chantix about 3 weeks ago.  He quit smoking 2 days ago on Saturday.  He is tolerating the Chantix without difficulty although he does note that his wife told him that he woke up in the middle of the night and told her that he wanted a divorce.  He does not recall this.  He believes it may have been a dream or she was dreaming.  He feels well.  No bleeding on his Coumadin.  No other concerns for me today.     Review of Systems: A comprehensive review of systems was negative except as noted.     Medications, Allergies and Problem List   Reviewed and updated     Physical Exam   General Appearance:   Pleasant gentleman.  Heart rate is a little fast today but he reports walking over here quite briskly.  Heart rate on my check is irregular and in low 90s.    Visit Vitals     /64 (Patient Site: Right Arm, Patient Position: Sitting, Cuff  Size: Adult Large)     Pulse (!) 110     Ht 6' (1.829 m)     Wt 182 lb (82.6 kg)     SpO2 98%     BMI 24.68 kg/m2            Additional Information   Current Outpatient Prescriptions   Medication Sig Dispense Refill     atenolol (TENORMIN) 50 MG tablet TAKE 1 TABLET (50 MG TOTAL) BY MOUTH DAILY. 90 tablet 1     ibuprofen (ADVIL,MOTRIN) 200 MG tablet Take 200 mg by mouth every 6 (six) hours as needed for pain.       lisinopril (PRINIVIL,ZESTRIL) 5 MG tablet TAKE 1 TABLET (5 MG TOTAL) BY MOUTH DAILY. 90 tablet 1     warfarin (COUMADIN) 5 MG tablet TAKE 1 AND 1/2 TABLETS BY MOUTH MONDAY & THURSDAY AND TAKE 1 TABLET ON ALL OTHER DAYS 120 tablet 0     varenicline (CHANTIX CONTINUING MONTH FRANCISCO) 1 mg tablet Take 1 tablet (1 mg total) by mouth 2 (two) times a day. Take with full glass of water. 60 tablet 1     Current Facility-Administered Medications   Medication Dose Route Frequency Provider Last Rate Last Dose     lidocaine 2 % jelly (XYLOCAINE)   Topical PRN Nany Levine, CNP   1 application at 12/28/16 1531     No Known Allergies  Social History   Substance Use Topics     Smoking status: Former Smoker     Smokeless tobacco: None     Alcohol use 1.2 oz/week     2 Cans of beer per week      Comment: drinks 1-2 times per week.       Review and/or order of clinical lab tests:  Review and/or order of radiology tests:  Review and/or order of medicine tests:  Discussion of test results with performing physician:  Decision to obtain old records and/or obtain history from someone other than the patient:  Review and summarization of old records and/or obtaining history from someone other than the patient and.or discussion of case with another health care provider:  Independent visualization of image, tracing or specimen itself:    Time: not applicable     Ronald Webb MD

## 2021-06-09 NOTE — TELEPHONE ENCOUNTER
ANTICOAGULATION MANAGEMENT PROGRAM--Non-Compliance Chart Review    Damien Mcclendon is overdue for INR follow up.      INR Results:   Lab Results   Component Value Date    INR 2.40 (!) 03/11/2020    INR 2.40 (H) 02/12/2020    INR 2.50 (H) 01/07/2020         Chart reviewed, continue non-compliance protocol       Leonor Whitfield RN

## 2021-06-09 NOTE — PROGRESS NOTES
My Clinic Care Coordination Wellness Plan  This Graduation Wellness Plan provides private information in regards to the work I have done with my Care Team from my Primary Care Clinic.  This document provides insight on the goals I have accomplished.  My Care Team congratulates me on my journey to maintain wellness.  This document will help guide me on my journey to maintain a healthy lifestyle.  I will use this to help me overcome any barriers I may encounter.  If I should have any questions or concerns, I will continue to contact the members of my Care Team or contact my Primary Care Clinic for assistance.         [facility] [address]  [address]  [address] [place]  [address]  [phone]    My Preferred Method of Contact:  Phone: [phone]    My Primary/Preferred Language:  English    Preferred Learning Style:  Reading information and Face to face discussion    Emergency Contact: Extended Emergency Contact Information  Primary Emergency Contact: Shawna Mcclendon  Address: [address]           [place] [address] [facility]  Home Phone: [phone]  Relation: Spouse     PCP:  Ronald Webb MD     Accomplishments:  Goals       COMPLETED: I better understand my insurance coverage benefits and  financial matters. (pt-stated)            By meeting with [facility]  and the Financial Resource Guide to discuss my living, transportation and work situation I have a better grasp on my finances. I also am getting food benefits from the [facility].           COMPLETED: I found a job (pt-stated)            By putting together a good resume with the [facility] , exploring job placement programs, filling out applications on line and going to interviews I was able to find a new job.               Advanced Directive/Living Will: The patient was given information regarding Adanced Directives/Living Will    Clinical Emergency  Plan:      All [facility] clinic patients have access to a Nurse 24 hours a day, 7 days a week.  If you have questions or want advice from a Nurse, please know [facility] is here for you.  You can call your clinic and they will connect you or you can call [facility] at [phone].  [facility] also has Walk In Care clinics in multiple locations.  Call the number listed above for more information about our Walk In Care clinics or visit the [facility] website at [email or web address].           High Blood Pressure (Hypertension)  You have been diagnosed with high blood pressure (also called hypertension). This means the force of blood against your artery walls is too strong. It also means your heart is working hard to move blood. High blood pressure usually has no symptoms, but over time, it can damage your heart, blood vessels, eyes, kidneys, and other organs. With help from your doctor, you can manage your blood pressure and protect your health.  Taking medications    Learn to take your own blood pressure. Keep a record of your results. Ask your doctor which readings mean that you need medical attention.    Take your blood pressure medication exactly as directed. Don t skip doses. Missing doses can cause your blood pressure to get out of control.    Avoid medications that contain heart stimulants, including over-the-counter drugs. Check for warnings about high blood pressure on the label.    Check with your doctor before taking a decongestant. Some decongestants can worsen high blood pressure.  Lifestyle changes    Maintain a healthy weight. Get help to lose any extra pounds.    Cut back on salt.    Limit canned, dried, packaged, and fast foods.    Don t add salt to your food at the table.    Season foods with herbs instead of salt when you cook.    Follow the DASH (Dietary Approaches to Stop Hypertension) eating plan. This plan recommends vegetables, fruits, whole gains, and other heart healthy foods.    Begin  an exercise program. Ask your doctor how to get started. The American Heart Association recommends aerobic exercise 3 to 4 times a week for an average of 40 minutes at a time, with your doctor's approval. Simple activities like walking or gardening can help.    Break the smoking habit. Enroll in a stop-smoking program to improve your chances of success. Ask your health care provider about programs and medications to help you stop smoking.    Limit drinks that contain caffeine (coffee, black or green tea, cola) to 2 per day.    Never take stimulants such as amphetamines or cocaine; these drugs can be deadly for someone with high blood pressure.    Control your stress. Learn stress-management techniques.    Limit alcohol to no more than 1 drink a day for women and 2 drinks a day for men.  Follow-up care  Make a follow-up appointment as directed by our staff.     When to seek medical care  Call your doctor immediately if you have any of the following:    Chest pain or shortness of breath (call 911)    Moderate to severe headache    Weakness in the muscles of your face, arms, or legs    Trouble speaking    Extreme drowsiness    Confusion    Fainting or dizziness    Pulsating or rushing sound in your ears    Unexplained nosebleed    Weakness, tingling, or numbness of your face, arms, or legs    Change in vision    Blood pressure measured at home that is greater than 180/110

## 2021-06-09 NOTE — TELEPHONE ENCOUNTER
RN cannot approve Refill Request    RN can NOT refill this medication med is not covered by policy/route to provider. Last office visit: 2/12/2020 Ronald Webb MD Last Physical: 1/7/2020 Last MTM visit: Visit date not found Last visit same specialty: 2/12/2020 Ronald Webb MD.  Next visit within 3 mo: Visit date not found  Next physical within 3 mo: Visit date not found      Muna Patel, Care Connection Triage/Med Refill 7/7/2020    Requested Prescriptions   Pending Prescriptions Disp Refills     celecoxib (CELEBREX) 200 MG capsule [Pharmacy Med Name: CELECOXIB 200 MG CAPSULE] 30 capsule 2     Sig: TAKE 1 CAPSULE BY MOUTH EVERY DAY       There is no refill protocol information for this order

## 2021-06-09 NOTE — PROGRESS NOTES
Date of Service:2/23/2017    Chief Complaint:   Chief Complaint   Patient presents with     Follow-up     bilat LE edema with venous dermatitis       History:    Jerald presents to clinic for reevaluation of his lower leg dermatitis.  He used the clobetasol for two weeks and his dermatitis resolved.  However, it returned about 3 weeks later.  He was under the understanding that he could not use it again.  He has not been using Aquaphor.  He is wearing compression stockings and they are about one month old.     Current Outpatient Prescriptions   Medication Sig Dispense Refill     atenolol (TENORMIN) 50 MG tablet TAKE 1 TABLET (50 MG TOTAL) BY MOUTH DAILY. 90 tablet 1     ibuprofen (ADVIL,MOTRIN) 200 MG tablet Take 200 mg by mouth every 6 (six) hours as needed for pain.       lisinopril (PRINIVIL,ZESTRIL) 5 MG tablet TAKE 1 TABLET (5 MG TOTAL) BY MOUTH DAILY. 90 tablet 1     varenicline (CHANTIX CONTINUING MONTH FRANCISCO) 1 mg tablet Take 1 tablet (1 mg total) by mouth 2 (two) times a day. Take with full glass of water. 60 tablet 1     warfarin (COUMADIN) 5 MG tablet TAKE 1 AND 1/2 TABLETS BY MOUTH MONDAY & THURSDAY AND TAKE 1 TABLET ON ALL OTHER DAYS 120 tablet 0     Current Facility-Administered Medications   Medication Dose Route Frequency Provider Last Rate Last Dose     lidocaine 2 % jelly (XYLOCAINE)   Topical PRN Nany Levine, CNP   1 application at 12/28/16 1531       No Known Allergies    Physical Exam:    Vitals:    02/23/17 1515   BP: 108/64   Pulse: 72   Resp: 18   Temp: 99.4  F (37.4  C)    There is no height or weight on file to calculate BMI.    General:  56 y.o. male in no apparent distress.    Psychiatric:  Alert and oriented x 3.  Cooperative.   Integumentary:  Skin is uniformly warm, dry and pink.  Lower extremity edema: +2 on left leg, none on right  Ulcerations:  None.  He has dermatitis on he left leg.       Vasc Edema 12/28/2016 1/4/2017 1/11/2017 1/18/2017 2/23/2017   Right just above MTP 23.8  23 23 23 23.7   Right Ankle 24.0 25 23.3 23.5 24.3   Right Widest Calf 35.0 34 34.5 33 35   Right Thigh Up 10cm 40 39.5 - - -   Left - just above MTP 22.8 23 23 22.5 23.3   Left Ankle 26.3 25 24.7 24.3 25.6   Left Widest Calf 37.9 36.5 36.2 34.7 37.9   Left Thigh Up 10cm 40.5 43 - - -       Assessment:  1. Venous stasis dermatitis of both lower extremities     2. Venous hypertension, chronic, bilateral     3. Chronic Cutaneous Ulcer Venous Stasis     4. History of DVT (deep vein thrombosis)     5. Nicotine Dependence         A new wound was identified today: no.    Plan:  1.   Venous insufficiency, stable    2.    Dermatitis, reoccurred.       3.   Treatment:   He will start using clobetasol daily until it is resolved, but not longer than 2 weeks.  He was instructed to use it on and off as it returns.  He will also use Aquaphor as needed.    4.   Tobacco abuse, he is on Chantix and reducing the number of cigarettes that he smokes.     5.   Patient will follow up with me in 3 months  for reevaluation.      Nany Levine, APRN, CNP,  [facility]  [phone]

## 2021-06-09 NOTE — PROGRESS NOTES
High Blood Pressure (Hypertension)  You have been diagnosed with high blood pressure (also called hypertension). This means the force of blood against your artery walls is too strong. It also means your heart is working hard to move blood. High blood pressure usually has no symptoms, but over time, it can damage your heart, blood vessels, eyes, kidneys, and other organs. With help from your doctor, you can manage your blood pressure and protect your health.  Taking medications    Learn to take your own blood pressure. Keep a record of your results. Ask your doctor which readings mean that you need medical attention.    Take your blood pressure medication exactly as directed. Don t skip doses. Missing doses can cause your blood pressure to get out of control.    Avoid medications that contain heart stimulants, including over-the-counter drugs. Check for warnings about high blood pressure on the label.    Check with your doctor before taking a decongestant. Some decongestants can worsen high blood pressure.  Lifestyle changes    Maintain a healthy weight. Get help to lose any extra pounds.    Cut back on salt.  o Limit canned, dried, packaged, and fast foods.  o Don t add salt to your food at the table.  o Season foods with herbs instead of salt when you cook.    Follow the DASH (Dietary Approaches to Stop Hypertension) eating plan. This plan recommends vegetables, fruits, whole gains, and other heart healthy foods.    Begin an exercise program. Ask your doctor how to get started. The American Heart Association recommends aerobic exercise 3 to 4 times a week for an average of 40 minutes at a time, with your doctor's approval. Simple activities like walking or gardening can help.    Break the smoking habit. Enroll in a stop-smoking program to improve your chances of success. Ask your health care provider about programs and medications to help you stop smoking.    Limit drinks that contain caffeine (coffee, black or  green tea, cola) to 2 per day.    Never take stimulants such as amphetamines or cocaine; these drugs can be deadly for someone with high blood pressure.    Control your stress. Learn stress-management techniques.    Limit alcohol to no more than 1 drink a day for women and 2 drinks a day for men.  Follow-up care  Make a follow-up appointment as directed by our staff.     When to seek medical care  Call your doctor immediately if you have any of the following:    Chest pain or shortness of breath (call 911)    Moderate to severe headache    Weakness in the muscles of your face, arms, or legs    Trouble speaking    Extreme drowsiness    Confusion    Fainting or dizziness    Pulsating or rushing sound in your ears    Unexplained nosebleed    Weakness, tingling, or numbness of your face, arms, or legs    Change in vision    Blood pressure measured at home that is greater than 180/110

## 2021-06-10 NOTE — TELEPHONE ENCOUNTER
----- Message from Ronald Webb MD sent at 8/5/2020 12:33 PM CDT -----  Please call pt and send letter:    Damien, your kidney function has declined considerably since we checked it last.  Have you been taking any over-the-counter anti-inflammatory medications such as Advil or Aleve?  Have you been staying hydrated?  I need you to stop the Celebrex at this time and I need to recheck your kidney function in a couple weeks.    Claudette please schedule a lab appointment in 2 weeks and place order for BMP.  Diagnosis is acute kidney injury.  You.

## 2021-06-10 NOTE — TELEPHONE ENCOUNTER
"ANTICOAGULATION  MANAGEMENT    Damien Mcclendon is on warfarin and had a point of care INR result > 5.5 or an \"error message\" on point of care meter today.    Morning INR; STAT venous INR processing requested from lab     Spoke with Damien via phone while at the lab and reviewed triage questions for potential signs and symptoms of bleeding.    - has not taken his warfarin yet - takes in the evening.     Patient Response     Have you had any bleeding in the last week?   No   Have you passed any red, black, or tarry stools in last week?   No   Have you vomited/spit up any red or coffee ground material in last week?   No   Have you had any new, severe abdominal pain or bloating develop in last week?   No   Have you fallen or had any injuries in last week?   No   Do you currently have a severe, sudden onset headache?   No   Do you currently have any severe sudden changes in your vision?   No   Do you currently have any new onset numbness, weakness/paralysis?   No     Assessment/Plan:     Damien's responses were negative for signs and symptoms of bleeding; may discharge from clinic    Damien instructed to:       Hold warfarin today until venous INR result returns and receives follow up call from [facility]    Seek medical attention for new signs and symptoms of bleeding or a fall with injury.           "

## 2021-06-10 NOTE — TELEPHONE ENCOUNTER
ANTICOAGULATION  MANAGEMENT    Assessment     Today's INR result of 3.10 is Supratherapeutic (goal INR of 2.0-3.0)        Warfarin recently held as instructed which may be affecting INR    - HELD warfarin doses on 8/4-5.    No new diet changes affecting INR    No new medication/supplements affecting INR    Continues to tolerate warfarin with no reported s/s of bleeding or thromboembolism     Previous INR was Supratherapeutic at 5.05 on 8/4/20.    Plan:     Left detailed message for  regarding INR result and instructed:     Warfarin Dosing Instructions:    - advised to take his usual 5mg warfarin dose tonight.   - Continue current warfarin dose 2.5 mg daily on Tues/Fri; and 5 mg daily rest of week.    Instructed patient to follow up no later than:  1-2 wks.   - INR scheduled on 8/19/20 @ [facility] along with other labs ([facility])    Education provided: target INR goal and significance of current INR result    Instructed to call the [facility] for any changes, questions or concerns. (#[phone])   ?   Rosa Maria Winkler RN    Subjective/Objective:      Damien Mcclendon, a 60 y.o. male is on warfarin.     Damien reports:     Home warfarin dose: template incorrect; verbally confirmed home dose with Damien and updated on anticoagulation calendar     Missed doses: Yes:  Reported holding warfarin on 8/4-5, as instructed.     Medication changes:  No     S/S of bleeding or thromboembolism:  No     New Injury or illness:  No     Changes in diet or alcohol consumption:  No     Upcoming surgery, procedure or cardioversion:  No    Anticoagulation Episode Summary     Current INR goal:   2.0-3.0   TTR:   70.0 % (7.3 mo)   Next INR check:   8/27/2020   INR from last check:   3.10! (8/13/2020)   Weekly max warfarin dose:      Target end date:      INR check location:      Preferred lab:      Send INR reminders to:   [facility]    Indications    History of DVT (deep vein thrombosis) [Z86.718]           Comments:             Anticoagulation Care Providers     Provider Role Specialty Phone number    Ronald Webb MD Referring Internal Medicine [phone]

## 2021-06-10 NOTE — PROGRESS NOTES
CHW reviewed RN Assessment    CHW delegation: Mail [employer] application. Since CHW is working remotely, CHW will discuss with CCC RN that this will have to be mailed when CCC RN is in the clinic next or by clinic staff.    Next outreach due: 8/21/2020

## 2021-06-10 NOTE — PROGRESS NOTES
Program: Health Insurance and philippe care   [facility]: [facility] #:   PMI #:   Date Applied:   Renewal Month:       Outreach:   8/28/20: FRW called pt and left VM to ensure he received the mnsure and philippe care forms. If he did not receive them he can call [phone]. FRW will follow up in 1-2 weeks.   8/13/20: FRW called pt to complete mnsure application. Online application could not be completed as pt has no credit and unable to pin individual to create an account. FRW completed the paper application and philippe care form. This will be mailed to pt to sign and [facility], FRW will check in in 2 weeks.  8/11/20: FRW called pt and scheduled visit for 8/13 at 11am to apply for health insurance.   FYI- might need philippe care    Health Insurance:      Referral/Screening:   Patient and wife currently do not have medical insurance. He was furloughed from his job at [employer] in March. He is now back working at the [email or web address] 16 hours a week at $17 an hour (approximately $1088 a month). He said there is currently no other income coming into the home. He has some money in the bank, the stimulus money he received from the Federal Government. Patient stated he also has medical debt from [facility] and is unable to currently pay back.     Household size: 2  Income: $1,088/m  Medical bills

## 2021-06-10 NOTE — PROGRESS NOTES
Clinic Care Coordination Contact  Community Health Worker Initial Outreach    CHW Initial Information Gathering:  Referral Source: PCP  Preferred Hospital: [facility]  [phone]  Current living arrangement:: I live in a private home with spouse  Type of residence:: Private home - stairs  Informal Support system:: Spouse  Transportation means:: Regular car  CHW Additional Questions  MyChart active?: No  Patient agreeable to assistance with activating MyChart?: No    Patient accepts CC: Yes. Patient scheduled for assessment with CCC RN on 8/10/2020 at 8am. Patient noted desire to discuss assistance with getting insurance for him and his wife. Due to COVID he is only working part-time due to furlough..

## 2021-06-10 NOTE — TELEPHONE ENCOUNTER
Anticoagulation clinic notificiation    Dr. Webb,    Your patient, Damien Mcclendon,  is past due for an INR check  The patient s last INR was 2.4 on 3/11/2020 and was due to come back on 5/5/2020.    Damien received two phone calls and one letter over the last several weeks in attempt to arrange a follow up appointment.  Damien will be sent another letter today.     No action is required from you unless you have additional information or if you would like to reach out personally to Damien.    Please don t hesitate to contact the Anticoagulation Management Program at [phone] if you have any questions or concerns.     Sincerely,     [facility] Anticoagulation Management Program

## 2021-06-10 NOTE — PROGRESS NOTES
Office Visit - Follow Up   Damien Mcclendon   60 y.o. male    Date of Visit: 8/4/2020    Chief Complaint   Patient presents with     Diabetes     f/u - fasting today         Assessment and Plan   1. Chronic pulmonary embolism, unspecified pulmonary embolism type, unspecified whether acute cor pulmonale present (H)  He needs to get his regular INRs.  I am hopeful that our care management team can help work out some sort of financial agreement or get him on some sort of insurance so that he can continue this.  It is imperative he not avoid getting care.  - Ambulatory referral to Care Management (Primary Care)    2. Tobacco abuse  Urged cessation and he is not ready at this time.    3. Prediabetes  We will check labs next time I see him.    4. Mixed hyperlipidemia  He is fasting today and I will do his lipid profile and med monitoring labs today.  - Comprehensive Metabolic Panel  - Lipid Cascade    5. Essential hypertension, benign  Blood pressure is not an issue.  Blood pressure is quite good.    6. Chronic Cutaneous Ulcer Venous Stasis  He tells me no ulcer currently.    7. History of DVT (deep vein thrombosis)    - INR        Return in about 6 months (around 2/4/2021) for Annual physical.     History of Present Illness   This 60 y.o. old Facundo comes in for follow-up.  He is without insurance currently.  He feels pretty good.  Continues to smoke.  Due for an INR.  Overdue actually.  He is on this for recurrent DVT and PE.  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  Difficult situation.  He would like to speak with a  regarding potential options.  Wife is not working either.  He continues to drink just 1 or 2 beers per day he tells me.    Review of Systems: A comprehensive review of systems was negative except as noted.     Medications, Allergies and Problem List   Reviewed, reconciled and updated  Post Discharge Medication Reconciliation Status:      Physical Exam   General  Appearance:   Pleasant gentleman.  Heart is regular.  Good spirits.  Further exam deferred.    /62   Pulse (!) 56   Temp 98.3  F (36.8  C)   Ht 6' (1.829 m)   Wt 183 lb (83 kg)   SpO2 95%   BMI 24.82 kg/m           Additional Information   Current Outpatient Medications   Medication Sig Dispense Refill     blood pressure test kit-medium Kit Check blood pressure and heart rate once daily. 1 each 0     celecoxib (CELEBREX) 200 MG capsule TAKE 1 CAPSULE BY MOUTH EVERY DAY 30 capsule 2     diltiazem (CARDIZEM CD) 120 MG 24 hr capsule Take 1 capsule (120 mg total) by mouth daily. 30 capsule 12     lisinopriL (PRINIVIL,ZESTRIL) 5 MG tablet TAKE 1 TABLET BY MOUTH EVERY DAY 90 tablet 3     metoprolol succinate (TOPROL-XL) 200 MG 24 hr tablet Take 1 tablet (200 mg total) by mouth daily. 90 tablet 3     rosuvastatin (CRESTOR) 10 MG tablet TAKE 1 TABLET BY MOUTH EVERYDAY AT BEDTIME 90 tablet 3     warfarin ANTICOAGULANT (COUMADIN/JANTOVEN) 5 MG tablet Take 1 tablet (5mg) by mouth daily, as directed.  Adjust dose based on INR results. 100 tablet 1     Current Facility-Administered Medications   Medication Dose Route Frequency Provider Last Rate Last Dose     lidocaine 2 % jelly (XYLOCAINE)   Topical PRN Nany Levine, CNP   1 application at 12/28/16 1531     Allergies   Allergen Reactions     Pravastatin      Memory changes, feels foggy      Social History     Tobacco Use     Smoking status: Current Every Day Smoker     Smokeless tobacco: Current User     Tobacco comment: stopped x 3 months ago   Substance Use Topics     Alcohol use: Yes     Alcohol/week: 2.0 standard drinks     Types: 2 Cans of beer per week     Comment: drinks 1-2 times per week.     Drug use: No       Review and/or order of clinical lab tests:  Review and/or order of radiology tests:  Review and/or order of medicine tests:  Discussion of test results with performing physician:  Decision to obtain old records and/or obtain history from  someone other than the patient:  Review and summarization of old records and/or obtaining history from someone other than the patient and.or discussion of case with another health care provider:  Independent visualization of image, tracing or specimen itself:    Time: not applicable     Ronald Webb MD

## 2021-06-10 NOTE — PROGRESS NOTES
Program: Health Insurance and philippe care   [facility]: [facility] #:   PMI #:   Date Applied:   Renewal Month:       Outreach:   8/13/20: FRW called pt to complete mnsure application. Online application could not be completed as pt has no credit and unable to pin individual to create an account. FRW completed the paper application and philippe care form. This will be mailed to pt to sign and return to [facility], FRW will check in in 2 weeks.  8/11/20: FRW called pt and scheduled visit for 8/13 at 11am to apply for health insurance.   [facility]    Health Insurance:      Referral/Screening:   Patient and wife currently do not have medical insurance. He was furloughed from his job at [employer] in March. He is now back working at the [employer] 16 hours a week at $17 an hour (approximately $1088 a month). He said there is currently no other income coming into the home. He has some money in the bank, the stimulus money he received from the Federal Government. Patient stated he also has medical debt from [facility] and is unable to currently pay back.     Household size: 2  Income: $1,088/m  Medical bills

## 2021-06-10 NOTE — TELEPHONE ENCOUNTER
RN cannot approve Refill Request    RN can NOT refill this medication med is not covered by policy/route to provider. Last office visit: 8/4/2020 Ronald Webb MD Last Physical: 1/7/2020 Last MTM visit: Visit date not found Last visit same specialty: 8/4/2020 Ronald Webb MD.  Next visit within 3 mo: Visit date not found  Next physical within 3 mo: Visit date not found      Samantha Sanchez, Care Connection Triage/Med Refill 8/11/2020    Requested Prescriptions   Pending Prescriptions Disp Refills     warfarin ANTICOAGULANT (COUMADIN/JANTOVEN) 5 MG tablet [Pharmacy Med Name: WARFARIN SODIUM 5 MG TABLET] 30 tablet 6     Sig: TAKE 1 TABLET (5MG) BY MOUTH DAILY, AS DIRECTED. ADJUST DOSE BASED ON INR RESULTS.       Warfarin Refill Protocol  Failed - 8/11/2020  6:17 PM        Failed -  Route to appropriate pool/provider     Last Anticoagulation Summary:   Anticoagulation Episode Summary     Current INR goal:   2.0-3.0   TTR:   72.4 % (7 mo)   Next INR check:   8/14/2020   INR from last check:   5.05! (8/4/2020)   Weekly max warfarin dose:      Target end date:      INR check location:      Preferred lab:      Send INR reminders to:   [facility]    Indications    History of DVT (deep vein thrombosis) [Z86.718]           Comments:            Anticoagulation Care Providers     Provider Role Specialty Phone number    Ronald Webb MD Referring Internal Medicine [phone]                Passed - Provider visit in last year     Last office visit with prescriber/PCP: 8/4/2020 Ronald Webb MD OR same dept: 8/4/2020 Ronald Webb MD OR same specialty: 8/4/2020 Ronald Webb MD  Last physical: 1/7/2020 Last MTM visit: Visit date not found    Next appt within 3 mo: Visit date not found Next physical within 3 mo: Visit date not found  Prescriber OR PCP: Ronald Webb MD  Last diagnosis associated with med order: 1. History of DVT (deep vein thrombosis)  - warfarin ANTICOAGULANT  (COUMADIN/JANTOVEN) 5 MG tablet [Pharmacy Med Name: WARFARIN SODIUM 5 MG TABLET]; Take 1 tablet (5mg) by mouth daily, as directed.  Adjust dose based on INR results.  Dispense: 30 tablet; Refill: 6    2. Atrial fibrillation (H)  - warfarin ANTICOAGULANT (COUMADIN/JANTOVEN) 5 MG tablet [Pharmacy Med Name: WARFARIN SODIUM 5 MG TABLET]; Take 1 tablet (5mg) by mouth daily, as directed.  Adjust dose based on INR results.  Dispense: 30 tablet; Refill: 6    3. Post Septic Embolism  - warfarin ANTICOAGULANT (COUMADIN/JANTOVEN) 5 MG tablet [Pharmacy Med Name: WARFARIN SODIUM 5 MG TABLET]; Take 1 tablet (5mg) by mouth daily, as directed.  Adjust dose based on INR results.  Dispense: 30 tablet; Refill: 6    4. Long term current use of anticoagulant therapy  - warfarin ANTICOAGULANT (COUMADIN/JANTOVEN) 5 MG tablet [Pharmacy Med Name: WARFARIN SODIUM 5 MG TABLET]; Take 1 tablet (5mg) by mouth daily, as directed.  Adjust dose based on INR results.  Dispense: 30 tablet; Refill: 6    If protocol passes may refill for 6 months if within 3 months of last provider visit (or a total of 9 months).

## 2021-06-10 NOTE — TELEPHONE ENCOUNTER
Pt notified lab results/recommendaitons and verbalized understanding. He reports that he is not using any otc antiinflammatory at this time. Patient will hold celebrex and drink more water.     Lab appt scheduled for recheck on 8/19/20. Future lab orders enter in epic to be co-sign.

## 2021-06-10 NOTE — TELEPHONE ENCOUNTER
ANTICOAGULATION  MANAGEMENT    Assessment     Today's INR result of 5.05 is Supratherapeutic (goal INR of 2.0-3.0)        Warfarin taken as previously instructed    No new diet changes affecting INR    No new medication/supplements affecting INR    Continues to tolerate warfarin with no reported s/s of bleeding or thromboembolism     Previous INR was Therapeutic at 2.40 on 3/24/20.  (her reported not coming in for INR checks, due to Covid-19, he has been furloughed and currently not working.  Having financial difficulties with no insurance coverage at this time).    Plan:     Spoke with Damien regarding INR result and instructed:     Warfarin Dosing Instructions:    - advised to HOLD warfarin for 2 days, 8/4-5.   - then change warfarin dose to 2.5 mg daily on Tues/Fri; and 5 mg daily rest of week.   - (14.3 % change)    Instructed patient to follow up no later than:  7-10 days.   - INR scheduled on8/13/20 @ [facility].    Education provided: importance of consistent vitamin K intake, impact of vitamin K foods on INR, target INR goal and significance of current INR result, monitoring for bleeding signs and symptoms and when to seek medical attention/emergency care    Damien verbalizes understanding and agrees to warfarin dosing plan.    Instructed to call the [facility] for any changes, questions or concerns. (#[phone])   ?   Rosa Maria Winkler RN    Subjective/Objective:      Damien RAMIREZ Hakan, a 60 y.o. male is on warfarin.     Damien reports:     Home warfarin dose: verbally confirmed home dose with Damien and updated on anticoagulation calendar     Missed doses: No     Medication changes:  No     S/S of bleeding or thromboembolism:  No     New Injury or illness:  No     Changes in diet or alcohol consumption:  No     Upcoming surgery, procedure or cardioversion:  No    Anticoagulation Episode Summary     Current INR goal:   2.0-3.0   TTR:   70.1 % (7.3 mo)   Next INR check:   8/14/2020   INR from last check:   5.05!  (8/4/2020)   Weekly max warfarin dose:      Target end date:      INR check location:      Preferred lab:      Send INR reminders to:   [facility]    Indications    History of DVT (deep vein thrombosis) [Z86.718]           Comments:            Anticoagulation Care Providers     Provider Role Specialty Phone number    Ronald Webb MD Referring Internal Medicine [phone]

## 2021-06-10 NOTE — TELEPHONE ENCOUNTER
Who is calling:  Damien  Reason for Call:  Damien returning call to Corrie  Date of last appointment with primary care: 8/4/2020  Okay to leave a detailed message: Yes

## 2021-06-10 NOTE — TELEPHONE ENCOUNTER
INITIAL INR > 5.5 NOTIFICATION- Anticoagulation Management Program    Damien Mcclendon had an initial point of care INR of 7.1.    Venous confirmation of INR required per protocol. STAT venous sample drawn and being sent out for confirmation.    Anticoagulation template scanned into EHR. Patient phone call with Anticoagulation Management Program (ACM) being arranged for patient triage prior to discharge.     Tessa Carty, [facility]

## 2021-06-10 NOTE — PROGRESS NOTES
Date of Service:5/18/2017    Chief Complaint:   Chief Complaint   Patient presents with     Follow-up     bilat LE edema with venous dermatitis       History:    Jerald presents to clinic for reevaluation of his lower leg edema.  He is wearing his compression stockings daily.  They are about 3 months old.  Therefore, he is in need of a new pair.  He has forgotten to apply sarna to his left leg for a couple of days so his leg has been more itchy.  Consequently, he has some excoriations on the left leg.  He has not had any difficulties with weeping skin or infections.     Current Outpatient Prescriptions   Medication Sig Dispense Refill     atenolol (TENORMIN) 50 MG tablet TAKE 1 TABLET (50 MG TOTAL) BY MOUTH DAILY. 90 tablet 1     ibuprofen (ADVIL,MOTRIN) 200 MG tablet Take 200 mg by mouth every 6 (six) hours as needed for pain.       lisinopril (PRINIVIL,ZESTRIL) 5 MG tablet TAKE 1 TABLET (5 MG TOTAL) BY MOUTH DAILY. 90 tablet 1     warfarin (COUMADIN) 5 MG tablet TAKE 1 AND 1/2 TABLETS BY MOUTH MONDAY & THURSDAY AND TAKE 1 TABLET ON ALL OTHER DAYS 120 tablet 0     Current Facility-Administered Medications   Medication Dose Route Frequency Provider Last Rate Last Dose     lidocaine 2 % jelly (XYLOCAINE)   Topical PRN Nany Levine, CNP   1 application at 12/28/16 1531       No Known Allergies    Physical Exam:    Vitals:    05/18/17 1527   BP: 118/78   Pulse: 84   Resp: 18   Temp: 99.1  F (37.3  C)    There is no height or weight on file to calculate BMI.    General:  57 y.o. male in no apparent distress.    Psychiatric:  Alert and oriented x 3.  Cooperative.   Integumentary:  Skin is uniformly warm, dry and pink.  Lower extremity edema: minimal on left leg  Ulcerations:  none    Vasc Edema 1/4/2017 1/11/2017 1/18/2017 2/23/2017 5/18/2017   Right just above MTP 23 23 23 23.7 22.8   Right Ankle 25 23.3 23.5 24.3 24.7   Right Widest Calf 34 34.5 33 35 35.3   Right Thigh Up 10cm 39.5 - - - -   Left - just above  MTP 23 23 22.5 23.3 23.   Left Ankle 25 24.7 24.3 25.6 26.2   Left Widest Calf 36.5 36.2 34.7 37.9 37.8   Left Thigh Up 10cm 43 - - - -       Assessment:  1. Venous stasis dermatitis of both lower extremities     2. Chronic Cutaneous Ulcer Venous Stasis     3. Venous hypertension, chronic, bilateral         A new wound was identified today: no.    Plan:  1.  Venous insufficiency, stable.  He is in need of a new pair of compression stockings.    2.  Treatment:   Provided purchasing information for his compression stockings.    3.   Patient will follow up with me in three months  for reevaluation.      Nany Levine, APRN, CNP,  [facility]  [phone]

## 2021-06-10 NOTE — PROGRESS NOTES
Office Visit - Follow Up   Damien Mcclendon   56 y.o. male    Date of Visit: 5/1/2017    Chief Complaint   Patient presents with     Follow-up     3 mo f/u - not fasting      Hip Pain     increased lt hip pain - had surgery x 15 years        Assessment and Plan   1. Left hip pain  I suspect he could have loosening components.  Refer to Dr. Mabry for evaluation and management.  - Ambulatory referral to Orthopedic Surgery    2. Nicotine Dependence  Congratulated him on his cessation.  I urged him to keep it up.    3. Pulmonary embolism    - INR    4. Atrial fibrillation, unspecified type  Rate controlled.  - INR    5. Chronic Cutaneous Ulcer Venous Stasis  He is using his cream per the wound clinic.  He states it looks good per    6. Mixed hyperlipidemia  Return fasting next visit.    7. Hyperglycemia  No symptoms of hyperglycemia.  Recheck A1c given history.  - Comprehensive Metabolic Panel  - Glycosylated Hemoglobin A1c    8. History of DVT (deep vein thrombosis)    - INR        Return in about 3 months (around 8/1/2017) for Recheck.     History of Present Illness   This 56 y.o. old Pierre comes in today for follow-up of his multiple medical problems.  He reports his been doing relatively well although he has noted left hip pain.  Seems to be like the pain he had before his hip arthroplasty.  He otherwise denies concerns for me.  He did quit smoking he states.  He dislikes taking the Chantix due to the nausea caused but he was successful fortunately.  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  He is gained some weight probably due to smoking cessation he reports.  He is due for an INR today.    Review of Systems: A comprehensive review of systems was negative except as noted.     Medications, Allergies and Problem List   Reviewed and updated     Physical Exam   General Appearance:   Pleasant gentleman.  Appears well.  Weight is up.  He does limp a little bit.    BP  124/78 (Patient Site: Right Arm, Patient Position: Sitting, Cuff Size: Adult Large)  Pulse 90  Ht 6' (1.829 m)  Wt 190 lb (86.2 kg)  SpO2 98%  BMI 25.77 kg/m2         Additional Information   Current Outpatient Prescriptions   Medication Sig Dispense Refill     atenolol (TENORMIN) 50 MG tablet TAKE 1 TABLET (50 MG TOTAL) BY MOUTH DAILY. 90 tablet 1     ibuprofen (ADVIL,MOTRIN) 200 MG tablet Take 200 mg by mouth every 6 (six) hours as needed for pain.       lisinopril (PRINIVIL,ZESTRIL) 5 MG tablet TAKE 1 TABLET (5 MG TOTAL) BY MOUTH DAILY. 90 tablet 1     warfarin (COUMADIN) 5 MG tablet TAKE 1 AND 1/2 TABLETS BY MOUTH MONDAY & THURSDAY AND TAKE 1 TABLET ON ALL OTHER DAYS 120 tablet 0     Current Facility-Administered Medications   Medication Dose Route Frequency Provider Last Rate Last Dose     lidocaine 2 % jelly (XYLOCAINE)   Topical PRN Nany Levine, CNP   1 application at 12/28/16 1531     No Known Allergies  Social History   Substance Use Topics     Smoking status: Former Smoker     Smokeless tobacco: None      Comment: stopped x 3 months ago     Alcohol use 1.2 oz/week     2 Cans of beer per week      Comment: drinks 1-2 times per week.       Review and/or order of clinical lab tests:  Review and/or order of radiology tests:  Review and/or order of medicine tests:  Discussion of test results with performing physician:  Decision to obtain old records and/or obtain history from someone other than the patient:  Review and summarization of old records and/or obtaining history from someone other than the patient and.or discussion of case with another health care provider:  Independent visualization of image, tracing or specimen itself:    Time: total time spent with the patient was 25 minutes of which >50% was spent in counseling and coordination of care     Ronald Webb MD

## 2021-06-10 NOTE — TELEPHONE ENCOUNTER
ANTICOAGULATION  MANAGEMENT    Assessment     Today's INR result of 2.50 is Therapeutic (goal INR of 2.0-3.0)        Warfarin taken as previously instructed    No new diet changes affecting INR    No new medication/supplements affecting INR    Continues to tolerate warfarin with no reported s/s of bleeding or thromboembolism     Previous INR was Supratherapeutic at 3.10 on 8/13/20.    Plan:     Left detailed message for Damien regarding INR result and instructed:    1.  Recommended to check INR again in 2 wks, to ensure INR stability.    Warfarin Dosing Instructions:    - Continue current warfarin dose 2.5 mg daily on Tues/Fri; and 5 mg daily rest of week.    Instructed patient to follow up no later than:  2 wks.    Education provided: target INR goal and significance of current INR result    Instructed to call the [facility] for any changes, questions or concerns. (#[phone])   ?   Rosa Maria Winkler RN    Subjective/Objective:      Damien Mcclendon, a 60 y.o. male is on warfarin.     Damien reports:     Home warfarin dose: as updated on anticoagulation calendar per template     Missed doses: No     Medication changes:  No     S/S of bleeding or thromboembolism:  No     New Injury or illness:  No     Changes in diet or alcohol consumption:  No     Upcoming surgery, procedure or cardioversion:  No    Anticoagulation Episode Summary     Current INR goal:   2.0-3.0   TTR:   72.3 % (7.3 mo)   Next INR check:   9/2/2020   INR from last check:   2.50 (8/19/2020)   Weekly max warfarin dose:      Target end date:      INR check location:      Preferred lab:      Send INR reminders to:   [facility]    Indications    History of DVT (deep vein thrombosis) [Z86.718]           Comments:            Anticoagulation Care Providers     Provider Role Specialty Phone number    Ronald Webb MD Referring Internal Medicine [phone]

## 2021-06-10 NOTE — TELEPHONE ENCOUNTER
Damien called back.     - vebalized back correct warfarin dose.     - INR schedule don 8/19/20 @ [facility].

## 2021-06-11 NOTE — PROGRESS NOTES
Damien came in today for his 3 rd Hep B shot  Ellen Sánchez CMA ([facility])

## 2021-06-11 NOTE — PROGRESS NOTES
Clinic Care Coordination Contact  [facility]/Voicemail       Clinical Data: Care Coordinator Outreach  Outreach attempted x 1.  Left message on patient's voicemail with call back information and requested return call.  Plan:  Care Coordinator will try to reach patient again in 3-5 business days.  Next outreach due: 9/8/2020

## 2021-06-11 NOTE — TELEPHONE ENCOUNTER
ANTICOAGULATION  MANAGEMENT    Assessment     Today's INR result of 2.60 is Therapeutic (goal INR of 2.0-3.0)        Warfarin taken as previously instructed    No new diet changes affecting INR    No new medication/supplements affecting INR    Continues to tolerate warfarin with no reported s/s of bleeding or thromboembolism     Previous INR was Therapeutic at 2.50 on 8/19/20.    Plan:     Left detailed message for Damien regarding INR result and instructed:     Warfarin Dosing Instructions:  (has 5mg tabs)   - Continue current warfarin dose 2.5 mg daily on Tues/Fri; and 5 mg daily rest of week.    Instructed patient to follow up no later than:  4 wks.    Education provided: importance of consistent vitamin K intake and target INR goal and significance of current INR result    Instructed to call the [facility] for any changes, questions or concerns. (#[phone])   ?   Rosa Maria Winkler RN    Subjective/Objective:      Damien JAMES Mcclendon, a 60 y.o. male is on warfarin.     Damien reports:     Home warfarin dose: as updated on anticoagulation calendar per template     Missed doses: No     Medication changes:  No     S/S of bleeding or thromboembolism:  No     New Injury or illness:  No     Changes in diet or alcohol consumption:  No     Upcoming surgery, procedure or cardioversion:  No    Anticoagulation Episode Summary     Current INR goal:   2.0-3.0   TTR:   78.7 % (7.3 mo)   Next INR check:   9/30/2020   INR from last check:   2.60 (9/2/2020)   Weekly max warfarin dose:      Target end date:      INR check location:      Preferred lab:      Send INR reminders to:   [facility]    Indications    History of DVT (deep vein thrombosis) [Z86.718]           Comments:            Anticoagulation Care Providers     Provider Role Specialty Phone number    Ronald Webb MD Referring Internal Medicine [phone]

## 2021-06-11 NOTE — PROGRESS NOTES
Clinic Care Coordination Contact  [facility]/Voicemail       Clinical Data: Care Coordinator Outreach  Outreach attempted x 2.  Left message on patient's voicemail with call back information and requested return call.  Plan: Care Coordinator will send unable to contact letter with care coordinator contact information via mail. Care Coordinator will try to reach patient again in 10 business days.  Next CHW outreach due: 9/23/2020

## 2021-06-11 NOTE — PROGRESS NOTES
Program: Health Insurance and philippe care   County: [place] #:   PMI #:   Date Applied:   Renewal Month:       Outreach:   9/8/20: Outreach attempted x 2. Left message on voicemail indicating last outreach attempt.   Plan: FRW will send an unreachable letter and remove from panel. Patient can be referred back to FRW.     8/28/20: FRW called pt and left VM to ensure he received the mnsure and philippe care forms. If he did not receive them he can call [phone]. FRW will follow up in 1-2 weeks.   8/13/20: FRW called pt to complete mnsure application. Online application could not be completed as pt has no credit and unable to pin individual to create an account. FRW completed the paper application and philippe care form. This will be mailed to pt to sign and return to [facility], FRW will check in in 2 weeks.      Health Insurance:      Referral/Screening:   Patient and wife currently do not have medical insurance. He was furloughed from his job at [employer] in March. He is now back working at the [employer] 16 hours a week at $17 an hour (approximately $1088 a month). He said there is currently no other income coming into the home. He has some money in the bank, the stimulus money he received from the [employer]. Patient stated he also has medical debt from [facility] and is unable to currently pay back.     Household size: 2  Income: $1,088/m  Medical bills

## 2021-06-12 NOTE — TELEPHONE ENCOUNTER
Anticoagulation Annual Referral Renewal Review    Damien Mcclendon's chart reviewed for annual renewal of referral to anticoagulation monitoring.        Criteria for anticoagulation nurse and/or pharmacist renewal met   Warfarin indication: Atrial Fibrillation, DVT and PE Yes, per indication   Current with INR monitoring/compliant Yes Yes   Date of last office visit 08/04/2020 Yes, had office visit within last year   Time in Therapeutic Range (TTR) 66.2 % Yes, TTR > 60%       Damien Mcclendon met all criteria for anticoagulation management program initiated renewal.  New INR standing orders and anticoagulation referral renewal placed.      Rosa Maria Winkler RN  12:38 PM

## 2021-06-12 NOTE — TELEPHONE ENCOUNTER
Anticoagulation Management    Unable to reach Damien today.    Today's INR result of 4.30 is Supratherapeutic (goal INR of 2.0-3.0).     Follow up required to assess for changes .    Left message to hold warfarin tonight.       ACN to follow up - ON 10/9.    Rosa Maria Winkler RN

## 2021-06-12 NOTE — PROGRESS NOTES
Date of Service:8/23/2017    Chief Complaint:   Chief Complaint   Patient presents with     Follow-up       History:    Jerald presents to clinic for reevaluation of his Venous insufficiency.  He is wearing his compression stockings daily.  He has not purchased Sarna for his left leg.  He is using clobetasol every 3-4 days for his itchy skin, which helps.  He has not had any difficulties with weeping skin or infections.  He continues to have areas of dry itchy skin.     Current Outpatient Prescriptions   Medication Sig Dispense Refill     atenolol (TENORMIN) 50 MG tablet TAKE 1 TABLET (50 MG TOTAL) BY MOUTH DAILY. 90 tablet 1     ibuprofen (ADVIL,MOTRIN) 200 MG tablet Take 200 mg by mouth every 6 (six) hours as needed for pain.       lisinopril (PRINIVIL,ZESTRIL) 5 MG tablet TAKE 1 TABLET (5 MG TOTAL) BY MOUTH DAILY. 90 tablet 1     warfarin (COUMADIN) 5 MG tablet TAKE 1 AND 1/2 TABLETS BY MOUTH MONDAY & THURSDAY AND TAKE 1 TABLET ON ALL OTHER DAYS 120 tablet 0     Current Facility-Administered Medications   Medication Dose Route Frequency Provider Last Rate Last Dose     lidocaine 2 % jelly (XYLOCAINE)   Topical PRN Nany Levine, CNP   1 application at 12/28/16 1531       No Known Allergies    Physical Exam:    Vitals:    08/23/17 1519   BP: 115/76   Pulse: 84   Resp: 16   Temp: 98.9  F (37.2  C)    There is no height or weight on file to calculate BMI.    General:  57 y.o. male in no apparent distress.    Psychiatric:  Alert and oriented x 3.  Cooperative.   Integumentary:  Skin is uniformly warm, dry and pink.  Lower extremity edema: minimal on left leg  Ulcerations:  none    Vasc Edema 1/11/2017 1/18/2017 2/23/2017 5/18/2017 8/23/2017   Right just above MTP 23 23 23.7 22.8 24.3   Right Ankle 23.3 23.5 24.3 24.7 25.2   Right Widest Calf 34.5 33 35 35.3 33.5   Right Thigh Up 10cm - - - - -   Left - just above MTP 23 22.5 23.3 23. 23.5   Left Ankle 24.7 24.3 25.6 26.2 27.5   Left Widest Calf 36.2 34.7 37.9  37.8 36.8   Left Thigh Up 10cm - - - - -       Assessment:  1. Venous stasis dermatitis of both lower extremities     2. History of DVT (deep vein thrombosis)     3. Venous stasis dermatitis of left lower extremity     4. Fungal infection of skin         A new wound was identified today: no.    Plan:  1.  Venous insufficiency, stable.  He is in need of a new pair of compression stockings.    2.  Fungal infection.  He will purchase and apply miconazole powder or cream daily x2 weeks and apply in the morning.    3.  Dermatitis, he is not using any moisturizer at this time.  Written information provided on recommended products.    4.  Treatment:  Recommended Sarna for his itchy skin instead of the clobetasol.  If needed, he could use the clobetasol no more than every 3-4 days.   He will also start using a moisturizer every evening.     5.   Patient will follow up with me in one month  for reevaluation of his fungal infection, dermatitis, and Venous insufficiency.  He will need to purchase a new pair of compression stockings soon.      Nany Levine, APRN, CNP,  [facility]  [phone]

## 2021-06-12 NOTE — TELEPHONE ENCOUNTER
"ANTICOAGULATION  MANAGEMENT    Damien Mcclendon is on warfarin and had a point of care INR result > 5.5 or an \"error message\" on point of care meter today.    Afternoon INR; STAT venous INR processing and STAT  requested from lab    Spoke with Damien via phone while at the lab and reviewed triage questions for potential signs and symptoms of bleeding.      Patient Response     Have you had any bleeding in the last week?   No   Have you passed any red, black, or tarry stools in last week?   No   Have you vomited/spit up any red or coffee ground material in last week?   No   Have you had any new, severe abdominal pain or bloating develop in last week?   No   Have you fallen or had any injuries in last week?   No   Do you currently have a severe, sudden onset headache?   No   Do you currently have any severe sudden changes in your vision?   No   Do you currently have any new onset numbness, weakness/paralysis?   No     Assessment/Plan:     Damien's responses were negative for signs and symptoms of bleeding; may discharge from clinic    Damien instructed to:       Hold warfarin today until venous INR result returns and receives follow up call from M    Seek medical attention for new signs and symptoms of bleeding or a fall with injury.       "

## 2021-06-12 NOTE — TELEPHONE ENCOUNTER
INITIAL INR > 5.5 NOTIFICATION- Anticoagulation Management Program    Damien Mcclendon had an initial point of care INR of 6.7     Venous confirmation of INR required per protocol. STAT venous sample drawn and being sent out for confirmation.    Anticoagulation template scanned into EHR. Patient phone call with Anticoagulation Management Program (ACM) being arranged for patient triage prior to discharge. Dr Kely Reddy MLT

## 2021-06-12 NOTE — TELEPHONE ENCOUNTER
ANTICOAGULATION  MANAGEMENT PROGRAM    Damien Mcclendon is overdue for INR check.     Spoke with Damien and scheduled INR appointment on 10/8/20 @ [facility].      Rosa Maria Winkler RN

## 2021-06-12 NOTE — PROGRESS NOTES
Clinic Care Coordination Contact  [facility]/Voicemail       Clinical Data: Care Coordinator Outreach  Outreach attempted x 2.  Left message on patient's voicemail with call back information and requested return call.  Plan: Care Coordinator will send disenrollment letter with care coordinator contact information via mail. Care Coordinator will do no further outreaches at this time.

## 2021-06-12 NOTE — TELEPHONE ENCOUNTER
Anticoagulation Management    Unable to reach Damien today.    Today's INR result of 4.20 is Supratherapeutic (goal INR of 2.0-3.0).     Follow up required to discuss out of range INR .    Left message to hold warfarin tonight.    ACN to follow up - ACN will f/u on 10/30.    Rosa Maria Winkler RN

## 2021-06-13 NOTE — PROGRESS NOTES
Office Visit - Follow Up   Damien Mcclendon   57 y.o. male    Date of Visit: 11/1/2017    Chief Complaint   Patient presents with     Hyperlipidemia     3 mo f/u - not fasting      Rash     ongoing left arm rash - not currently using any topical cream/ointment         Assessment and Plan   1. Hip pain  Trial of Celebrex rather than naproxen or Aleve or ibuprofen.  I discussed with him that he all of these medications can cause issues with his warfarin.    2. Dermatitis  He has clobetasol cream at home.  He will put that on his arm for no more than a week.  I have urged him to stop scratching.    3. Atrial fibrillation with rapid ventricular response  His heart rate is too fast.  Blood pressures on the lower side.  Check Holter and see if he is chronically elevated.  He did say he ran over here today.  May need additional rate lowering medication.  - HM2(CBC w/o Differential)  - Comprehensive Metabolic Panel  - Holter Monitor; Future    4. Chronic Cutaneous Ulcer Venous Stasis  Continue to follow with the wound clinic.    5. T2DM (type 2 diabetes mellitus)  He is not checking sugars.  A1c for monitoring.  - Glycosylated Hemoglobin A1c    6. Pelvic mass  I am not sure what to make of that lump over the pelvic bone.  Check x-ray.  See me back soon.  - XR Pelvis W 2 Vw Hips Bilateral; Future    7. Atrial fibrillation, unspecified type  As above.  - INR    8. History of DVT (deep vein thrombosis)    - INR    9. Pulmonary embolism    - INR    10. Need for immunization against influenza    - Influenza, Seasonal Quad, Preservative Free 36+ Months        Return in about 4 weeks (around 11/29/2017).     History of Present Illness   This 57 y.o. old patient comes in today for follow-up of his multiple medical problems.  It has been sometime since I saw him last.  He continues to be off cigarettes.  He works hard.  He is getting his wages garnished unfortunately he states.  He had been turned in a collection agency many years  ago and they finally tracked down apparently.  He might be declaring bankruptcy.  He does occasionally note palpitations.  No chest pains.  His hip pains continue to be a problem.  The orthopedist told him to take Aleve or ibuprofen.  Unfortunately patient is on Coumadin and should not be taking these medications and I discussed that with him at length today.  He notes a swelling/painful area in his low back.  Wonders if that is from the failing hip arthroplasty.  He also notes a very pruritic left arm rash.  He is not putting anything on it.  Apparently his legs have been healing up okay.    Review of Systems: A comprehensive review of systems was negative except as noted.     Medications, Allergies and Problem List   Reviewed and updated     Physical Exam   General Appearance:   Pleasant gentleman.  He smells of cigarette smoke.  He has excoriation and dermatitis over his left upper forearm.  Heart is irregularly regular and tachycardic.  He has a bony enlargement/deformity over the left pubic bone posteriorly.    /84 (Patient Site: Right Arm, Patient Position: Sitting, Cuff Size: Adult Regular)  Pulse (!) 110  Ht 6' (1.829 m)  Wt 183 lb (83 kg)  SpO2 99%  BMI 24.82 kg/m2         Additional Information   Current Outpatient Prescriptions   Medication Sig Dispense Refill     atenolol (TENORMIN) 50 MG tablet TAKE 1 TABLET (50 MG TOTAL) BY MOUTH DAILY. 90 tablet 1     lisinopril (PRINIVIL,ZESTRIL) 5 MG tablet TAKE 1 TABLET (5 MG TOTAL) BY MOUTH DAILY. 90 tablet 1     warfarin (COUMADIN) 5 MG tablet Take one tablet daily by mouth, as directed.  Dose adjusted based on INR results. 100 tablet 0     celecoxib (CELEBREX) 200 MG capsule Take 1 capsule (200 mg total) by mouth daily. 30 capsule 2     Current Facility-Administered Medications   Medication Dose Route Frequency Provider Last Rate Last Dose     lidocaine 2 % jelly (XYLOCAINE)   Topical PRN Nany Levine CNP   1 application at 12/28/16 7062      No Known Allergies  Social History   Substance Use Topics     Smoking status: Former Smoker     Smokeless tobacco: Never Used      Comment: stopped x 3 months ago     Alcohol use 1.2 oz/week     2 Cans of beer per week      Comment: drinks 1-2 times per week.       Review and/or order of clinical lab tests:  Review and/or order of radiology tests:  Review and/or order of medicine tests:  Discussion of test results with performing physician:  Decision to obtain old records and/or obtain history from someone other than the patient:  Review and summarization of old records and/or obtaining history from someone other than the patient and.or discussion of case with another health care provider:  Independent visualization of image, tracing or specimen itself:    Time: total time spent with the patient was 25 minutes of which >50% was spent in counseling and coordination of care     Ronald Webb MD

## 2021-06-13 NOTE — PROGRESS NOTES
Date of Service:9/20/2017    Chief Complaint:   Chief Complaint   Patient presents with     Follow-up       History:    Jerald presents to clinic for reevaluation of his Venous insufficiency.  He is wearing his compression stockings daily.  He was unable to find an antifungal or athletes foot powder anywhere, so he has not applied any.  He has not purchased Sarna for his left leg.  He is using Eucerin every 3-4 days for his itchy skin, which helps.  He has not had any difficulties with weeping skin or infections.       Current Outpatient Prescriptions   Medication Sig Dispense Refill     atenolol (TENORMIN) 50 MG tablet TAKE 1 TABLET (50 MG TOTAL) BY MOUTH DAILY. 90 tablet 1     ibuprofen (ADVIL,MOTRIN) 200 MG tablet Take 200 mg by mouth every 6 (six) hours as needed for pain.       lisinopril (PRINIVIL,ZESTRIL) 5 MG tablet TAKE 1 TABLET (5 MG TOTAL) BY MOUTH DAILY. 90 tablet 1     warfarin (COUMADIN) 5 MG tablet Take one tablet daily by mouth, as directed.  Dose adjusted based on INR results. 100 tablet 0     Current Facility-Administered Medications   Medication Dose Route Frequency Provider Last Rate Last Dose     lidocaine 2 % jelly (XYLOCAINE)   Topical PRN Nany Levine, CNP   1 application at 12/28/16 1531       No Known Allergies    Physical Exam:    Vitals:    09/20/17 1605   BP:    Pulse: (!) 108   Resp:    Temp:     There is no height or weight on file to calculate BMI.    General:  57 y.o. male in no apparent distress.    Psychiatric:  Alert and oriented x 3.  Cooperative.   Integumentary:  Skin is uniformly warm, dry and pink.  Lower extremity edema: minimal on left leg  Ulcerations:  none    Vasc Edema 1/18/2017 2/23/2017 5/18/2017 8/23/2017 9/20/2017   Right just above MTP 23 23.7 22.8 24.3 23.6   Right Ankle 23.5 24.3 24.7 25.2 23.7   Right Widest Calf 33 35 35.3 33.5 37.6   Right Thigh Up 10cm - - - - -   Left - just above MTP 22.5 23.3 23. 23.5 22.5   Left Ankle 24.3 25.6 26.2 27.5 24.5   Left  Widest Calf 34.7 37.9 37.8 36.8 34.5   Left Thigh Up 10cm - - - - -       Assessment:  1. Fungal infection of skin     2. History of DVT (deep vein thrombosis)     3. Nicotine Dependence         A new wound was identified today: no.    Plan:  1.  Venous insufficiency, stable, but edema slightly worse.  At his last visit, he was in need of a new pair of compression stockings, but he has not obtained any    2.  Fungal infection. He was unable to locate any antifungal powder.  A prescription was written.    3.  Dermatitis, stable    4.  Treatment:  Recommended Sarna for his itchy skin.  He will start using nystatin powder for the next two weeks.  He will purchase a new pair of compression stockings and start wearing them     5.   Patient will follow up with me in one month  for reevaluation of his fungal infection, dermatitis, and Venous insufficiency.  If his swelling does not improve, I will consider applying a 2-layer wrap for a few days.    Nany Levine, APRN, CNP,  [facility]  [phone]

## 2021-06-13 NOTE — TELEPHONE ENCOUNTER
Anticoagulation Management    Unable to reach Damien today.    Today's INR result of 1.80 is Subtherapeutic (goal INR of 2.0-3.0).     Follow up required to confirm doses taken and assess for changes (no template).    Left message to take a booster dose of warfarin,  5 mg tonight.    - will increase his wkly warfarin dose with 5mg on Mon/Thurs and 2.5mg all other days. (10% change)      ACN to follow up - Fri. 11/20.    Rosa Maria Winkler RN

## 2021-06-13 NOTE — TELEPHONE ENCOUNTER
ANTICOAGULATION  MANAGEMENT    Assessment     Today's INR result of 2.10 is Therapeutic (goal INR of 2.0-3.0)        Warfarin taken as previously instructed    No new diet changes affecting INR    No new medication/supplements affecting INR    Continues to tolerate warfarin with no reported s/s of bleeding or thromboembolism     Previous INR was Subtherapeutic at 1.80 on 11/19/20.    Plan:     Left detailed message for Damien regarding INR result and instructed:     Warfarin Dosing Instructions:    - Continue current warfarin dose 5 mg daily on Mon/Thurs; and 2.5 mg daily rest of week.    Instructed patient to follow up no later than:  2-3 wks.    Education provided: importance of consistent vitamin K intake and target INR goal and significance of current INR result    Instructed to call the [facility] for any changes, questions or concerns. (#[phone])   ?   Rosa Maria Winkler RN    Subjective/Objective:      Damienvenu Mcclendon, a 60 y.o. male is on warfarin.     Damien reports:     Home warfarin dose: as updated on anticoagulation calendar per template     Missed doses: No     Medication changes:  No     S/S of bleeding or thromboembolism:  No     New Injury or illness:  No     Changes in diet or alcohol consumption:  No     Upcoming surgery, procedure or cardioversion:  No    Anticoagulation Episode Summary     Current INR goal:  2.0-3.0   TTR:  56.0 % (7.3 mo)   Next INR check:  12/31/2020   INR from last check:  2.10 (12/10/2020)   Weekly max warfarin dose:     Target end date:     INR check location:     Preferred lab:     Send INR reminders to:  [facility]    Indications    History of DVT (deep vein thrombosis) [Z86.718]           Comments:           Anticoagulation Care Providers     Provider Role Specialty Phone number    Ronald Webb MD Referring Internal Medicine [phone]

## 2021-06-14 NOTE — TELEPHONE ENCOUNTER
Refill Approved    Rx renewed per Medication Renewal Policy. Medication was last renewed on 1/24/20.    Ilene Stevens, [facility] Triage/Med Refill 1/31/2021     Requested Prescriptions   Pending Prescriptions Disp Refills     metoprolol succinate (TOPROL-XL) 200 MG 24 hr tablet [Pharmacy Med Name: METOPROLOL SUCC  MG TAB] 90 tablet 3     Sig: TAKE 1 TABLET BY MOUTH EVERY DAY       Beta-Blockers Refill Protocol Passed - 1/29/2021 12:15 AM        Passed - PCP or prescribing provider visit in past 12 months or next 3 months     Last office visit with prescriber/PCP: 8/4/2020 Ronald Webb MD OR same dept: 8/4/2020 Ronald Webb MD OR same specialty: 8/4/2020 Ronald Webb MD  Last physical: 1/7/2020 Last MTM visit: Visit date not found   Next visit within 3 mo: Visit date not found  Next physical within 3 mo: Visit date not found  Prescriber OR PCP: Ronald Webb MD  Last diagnosis associated with med order: 1. Persistent atrial fibrillation (H)  - metoprolol succinate (TOPROL-XL) 200 MG 24 hr tablet [Pharmacy Med Name: METOPROLOL SUCC  MG TAB]; TAKE 1 TABLET BY MOUTH EVERY DAY  Dispense: 90 tablet; Refill: 3    If protocol passes may refill for 12 months if within 3 months of last provider visit (or a total of 15 months).             Passed - Blood pressure filed in past 12 months     BP Readings from Last 1 Encounters:   08/04/20 100/62

## 2021-06-14 NOTE — PROGRESS NOTES
Office Visit - Follow Up   Damien Mcclendon   57 y.o. male    Date of Visit: 11/16/2017    Chief Complaint   Patient presents with     Hip Pain     celebrex is helping - did see othro 2 days ago      Follow-up     scheduled to see cardiology 11/20/17        Assessment and Plan   1. Atrial fibrillation with rapid ventricular response  Continue the higher dose of the atenolol until he sees cardiology on Monday.  Limited a little bit by his blood pressure.  I prefer not to discontinue the lisinopril has its a low dose and is on it for renal protection.  May need to add digoxin for rate control.    2. Elevated liver enzymes  Repeat today.  I counseled patient to have no more than 2 drinks a day.  - Hepatic Profile    3. Macrocytosis  I do question whether patient could be minimizing his alcohol use however.  Check B12 and folate.  Again urged alcohol in moderation at most.  - Vitamin B12  - Folate, Serum    4. Hip pain  Continue the Celebrex.  Tolerating this without difficulty.        Return in about 3 months (around 2/16/2018) for Recheck.     History of Present Illness   This 57 y.o. old Damien comes in today for follow-up of his A. fib with rapid ventricular response.  His Holter did show that he was in rapid ventricular response much of the day.  He is on atenolol 50 mg and I increased that just yesterday to 75 mg.  He is tolerating this without difficulty.  He does not feel any difference.  No lightheadedness or dizziness.  He is also on Celebrex for his hip pain.  He had been taking Aleve or ibuprofen I discussed with his Coumadin he cannot be taking that medication.  He saw orthopedics regarding the lump on his posterior aspect of his pelvis and they felt this was muscular.  They were not concerned.  X-rays were negative for bony mass.  Finally he was found to have elevated transaminases as well as macrocytosis at last visit.  I inquired about drinking.  He states he will have maybe a couple beers at a time a  few days a week.  He does not drink heavily he states.  His wife is an alcoholic.  He states he does not eat the healthiest.    Review of Systems: A comprehensive review of systems was negative except as noted.     Medications, Allergies and Problem List   Reviewed and updated     Physical Exam   General Appearance:   Pleasant gentleman.  Heart is irregularly regular and rate is 90s-100s.    /78 (Patient Site: Right Arm, Patient Position: Sitting, Cuff Size: Adult Regular)  Pulse 100  Ht 6' (1.829 m)  Wt 183 lb (83 kg)  SpO2 98%  BMI 24.82 kg/m2         Additional Information   Current Outpatient Prescriptions   Medication Sig Dispense Refill     atenolol (TENORMIN) 50 MG tablet TAKE 1 TABLET (50 MG TOTAL) BY MOUTH DAILY. (Patient taking differently: TAKE 1 AND HALF TABLET (75 MG TOTAL) BY MOUTH DAILY.) 90 tablet 3     celecoxib (CELEBREX) 200 MG capsule Take 1 capsule (200 mg total) by mouth daily. 30 capsule 2     clobetasol (TEMOVATE) 0.05 % cream Apply to your lower leg daily x2 weeks, sooner if your swelling is reduced. 60 g 0     lisinopril (PRINIVIL,ZESTRIL) 5 MG tablet TAKE 1 TABLET (5 MG TOTAL) BY MOUTH DAILY. 90 tablet 1     warfarin (COUMADIN) 5 MG tablet Take one tablet daily by mouth, as directed.  Dose adjusted based on INR results. 100 tablet 0     Current Facility-Administered Medications   Medication Dose Route Frequency Provider Last Rate Last Dose     lidocaine 2 % jelly (XYLOCAINE)   Topical PRN Nany Levine, CNP   1 application at 12/28/16 1531     No Known Allergies  Social History   Substance Use Topics     Smoking status: Former Smoker     Smokeless tobacco: Never Used      Comment: stopped x 3 months ago     Alcohol use 1.2 oz/week     2 Cans of beer per week      Comment: drinks 1-2 times per week.       Review and/or order of clinical lab tests:  Review and/or order of radiology tests:  Review and/or order of medicine tests:  Discussion of test results with performing  physician:  Decision to obtain old records and/or obtain history from someone other than the patient:  Review and summarization of old records and/or obtaining history from someone other than the patient and.or discussion of case with another health care provider:  Independent visualization of image, tracing or specimen itself:    Time: not applicable     Ronald Webb MD

## 2021-06-14 NOTE — PROGRESS NOTES
Date of Service:11/8/2017    Chief Complaint:   Chief Complaint   Patient presents with     Follow-up       History:    Jerald presents to clinic for reevaluation of his Venous insufficiency.  He is wearing his compression stockings daily.  He started using an antifungal or athletes foot powder on his leg, but he broke out in a rash.  Since then, his leg has been more swollen.  However, he has not purchased a new pair of compression stockings.  He has a catalog where he can purchase them less expensively.   He has not had any difficulties with weeping skin or infections.       Current Outpatient Prescriptions   Medication Sig Dispense Refill     atenolol (TENORMIN) 50 MG tablet TAKE 1 TABLET (50 MG TOTAL) BY MOUTH DAILY. 90 tablet 1     celecoxib (CELEBREX) 200 MG capsule Take 1 capsule (200 mg total) by mouth daily. 30 capsule 2     lisinopril (PRINIVIL,ZESTRIL) 5 MG tablet TAKE 1 TABLET (5 MG TOTAL) BY MOUTH DAILY. 90 tablet 1     warfarin (COUMADIN) 5 MG tablet Take one tablet daily by mouth, as directed.  Dose adjusted based on INR results. 100 tablet 0     clobetasol (TEMOVATE) 0.05 % cream Apply to your lower leg daily x2 weeks, sooner if your swelling is reduced. 60 g 0     Current Facility-Administered Medications   Medication Dose Route Frequency Provider Last Rate Last Dose     lidocaine 2 % jelly (XYLOCAINE)   Topical PRN Nany Levine, CNP   1 application at 12/28/16 1531       No Known Allergies    Physical Exam:    Vitals:    11/08/17 1520   BP: 136/74   Pulse: 100   Resp: 18   Temp: 98.3  F (36.8  C)    There is no height or weight on file to calculate BMI.    General:  57 y.o. male in no apparent distress.    Psychiatric:  Alert and oriented x 3.  Cooperative.   Integumentary:  Skin is uniformly warm, dry and pink.  Lower extremity edema: minimal on left leg  Ulcerations:  none    Vasc Edema 2/23/2017 5/18/2017 8/23/2017 9/20/2017 11/8/2017   Right just above MTP 23.7 22.8 24.3 23.6 23.5   Right  Ankle 24.3 24.7 25.2 23.7 24.6   Right Widest Calf 35 35.3 33.5 37.6 36   Right Thigh Up 10cm - - - - -   Left - just above MTP 23.3 23. 23.5 22.5 23.5   Left Ankle 25.6 26.2 27.5 24.5 26.3   Left Widest Calf 37.9 37.8 36.8 34.5 39   Left Thigh Up 10cm - - - - -       Assessment:  1. Dermatitis  clobetasol (TEMOVATE) 0.05 % cream   2. Chronic Cutaneous Ulcer Venous Stasis     3. History of DVT (deep vein thrombosis)         A new wound was identified today: no.    Plan:  1.  Venous insufficiency, stable, but edema slightly worse.  At his last visit, he was in need of a new pair of compression stockings, but he has not obtained any    2.  Fungal infection. Resolved clinically    3.  Dermatitis, worse    4.  Treatment:  Will start clobetasol on his lower leg to help resolved the dermatitis and swelling.  He will also purchase a new pair of compression stockings.  If his swelling does not improve, I will consider applying a 2-layer wrap for a few days.      5.   Patient will follow up with me in one month  for reevaluation of his fungal infection, dermatitis, and Venous insufficiency.   Nany Levine, APRN, CNP,  [facility]  [phone]

## 2021-06-14 NOTE — PROGRESS NOTES
Date of Service:11/22/2017    Chief Complaint:   Chief Complaint   Patient presents with     Follow-up       History:    Jerald presents to clinic for reevaluation of his Venous insufficiency.  He is wearing his compression stockings daily. He has not had any difficulties with weeping skin or infections. He is using the clobetasol for his rash and it has resolved.  His itchy skin has improved.  He left leg is always more swollen at the end of the day, but resolves over night.     Current Outpatient Prescriptions   Medication Sig Dispense Refill     atenolol (TENORMIN) 50 MG tablet Take 2 tablets (100 mg total) by mouth daily.       celecoxib (CELEBREX) 200 MG capsule Take 1 capsule (200 mg total) by mouth daily. 30 capsule 2     clobetasol (TEMOVATE) 0.05 % cream Apply to your lower leg daily x2 weeks, sooner if your swelling is reduced. 60 g 0     lisinopril (PRINIVIL,ZESTRIL) 5 MG tablet TAKE 1 TABLET (5 MG TOTAL) BY MOUTH DAILY. (Patient taking differently: TAKE 1 TABLET (75 MG TOTAL) BY MOUTH DAILY.) 90 tablet 1     warfarin (COUMADIN) 5 MG tablet Take one tablet daily by mouth, as directed.  Dose adjusted based on INR results. 100 tablet 0     Current Facility-Administered Medications   Medication Dose Route Frequency Provider Last Rate Last Dose     lidocaine 2 % jelly (XYLOCAINE)   Topical PRN Nany Levine, CNP   1 application at 12/28/16 1531       No Known Allergies    Physical Exam:    Vitals:    11/22/17 1500   BP: 129/75   Pulse: (!) 115   Resp: 18    There is no height or weight on file to calculate BMI.    General:  57 y.o. male in no apparent distress.    Psychiatric:  Alert and oriented x 3.  Cooperative.   Integumentary:  Skin is uniformly warm, dry and pink.  Lower extremity edema: minimal on left leg  Ulcerations:  none    Vasc Edema 5/18/2017 8/23/2017 9/20/2017 11/8/2017 11/22/2017   Right just above MTP 22.8 24.3 23.6 23.5 21   Right Ankle 24.7 25.2 23.7 24.6 22   Right Widest Calf 35.3  33.5 37.6 36 34   Right Thigh Up 10cm - - - - -   Left - just above MTP 23. 23.5 22.5 23.5 22   Left Ankle 26.2 27.5 24.5 26.3 24   Left Widest Calf 37.8 36.8 34.5 39 37   Left Thigh Up 10cm - - - - -       Assessment:  1. Dermatitis     2. Chronic Cutaneous Ulcer Venous Stasis     3. History of DVT (deep vein thrombosis)         A new wound was identified today: no.    Plan:  1.  Venous insufficiency, stable, but edema slightly worse.  At his last visit, he was in need of a new pair of compression stockings, but he has not obtained any    2.  Dermatitis, resolved    4.  Treatment:  Will discontinue clobetasol on his lower leg.  He will start using Sarna.  He will continue to wear his compression stockings daily    5.   Patient will follow up with me in three months  for reevaluation of his fungal infection, dermatitis, and Venous insufficiency.     Nany Levine, APRN, CNP,  [facility]  [phone]

## 2021-06-14 NOTE — PROGRESS NOTES
[facility] Note    Assessment:    Damien Mcclendon is a 57 y.o. old male with HL, AF, h/o DVT/PE, smoking, venous ulcers,  here for f/u of findings of Holter study.    Plan:    # AF - persistent, asymptomatic  - continue warfarin, will switch BB to metoprolol eventually, but since he just filled his atenolol, will up that to 100mg daily  - he will call our office in 1 wk and report his HR's over the course of the day for any possibly further adjustment  - once he runs out of atenolol, we will switch him to a corresponding dose of metoprolol  - will get a TTE to evaluate ventricular and valvular fxn.    # HTN - continue lisinopril, increase BB as per above    # Venous ulcers/edema - currenty improved control w/ compression stockings      ANNETTE LUNA  [facility]  [phone]  ______________________________________________________________________    Subjective:  CC: AF    I had the opportunity to see Damien Mcclendon at the [facility]. Damien Mcclendon is a 57 y.o. male with a known history of HL, AF, h/o DVT/PE, smoking, venous ulcers, here for f/u of findings of Holter study.    His first DVT was ~15 yrs. Ago after a hip replacement, second DVT and concomitant PE ~ 2 yrs later after coumadin was stopped. Overall, he has been doing fairly well since then, until he went to see his PCP for a routine visit ~2d ago, and it was discovered that his HR was high (which happened because he ran up the stairs to the appointment). His atenolol was increased to 75mg daily and a Holter was done, which showed persistent AF w/ tendency towards RVR, especially during the daytime (average ). His HR today is in 130's but he walked briskly from [facility] as he was running a bit late for the appointment. He works in [employer] and is fairly active, often times taking stairs as high as up to the 8th floor. No CP, no significant SOB, BAEZA, orthopnea/PND. + pedal edema  bilaterally managed w/ compression stockings (no current ulcers), + palpitations, no syncope/pre-syncope but can get lightheaded if stands up too quickly. No N/V/D/F/C/wt.changes.     His EKG in the office today shows AF at 109 w/ isolated Qs in 3.   ______________________________________________________________________    Review of Systems:   As noted in HPI, all others reviewed and are negative    Problem List:  Patient Active Problem List   Diagnosis     Nicotine Dependence     Avascular Necrosis     Mixed hyperlipidemia     Chronic Cutaneous Ulcer Venous Stasis     DM type 2 (diabetes mellitus, type 2)     Pulmonary embolism     Atrial Fibrillation     Male Erectile Disorder     Memory Lapses Or Loss     Abnormal Weight Loss     A-fib     History of DVT (deep vein thrombosis)     Hip fracture     Medical History:  Past Medical History:   Diagnosis Date     Arthritis      Atrial fibrillation      History of transfusion      Hypertension      PVD (peripheral vascular disease)     reports leaky blood vessels in left leg     Surgical History:  Past Surgical History:   Procedure Laterality Date     EYE SURGERY  1972    had cataract removed after BB lodged in eye     HERNIA REPAIR  2012     JOINT REPLACEMENT Bilateral 2002    Left hip     JOINT REPLACEMENT  2007    right hip     Social History:  Social History     Social History     Marital status:      Spouse name: N/A     Number of children: 1     Years of education: N/A     Occupational History     [employer]      Social History Main Topics     Smoking status: Former Smoker     Smokeless tobacco: Never Used      Comment: stopped x 3 months ago     Alcohol use 1.2 oz/week     2 Cans of beer per week      Comment: drinks 1-2 times per week.     Drug use: No     Sexual activity: Yes     Other Topics Concern     Not on file     Social History Narrative    Lives with his wife     Family History:  Family History   Problem Relation Age of Onset      Diabetes Mother      Heart disease Mother      Obesity Mother      Heart disease Father      Varicose Veins Father      No Medical Problems Daughter      Allergies:  No Known Allergies    Medications:  Current Outpatient Prescriptions   Medication Sig Dispense Refill     atenolol (TENORMIN) 50 MG tablet TAKE 1 TABLET (50 MG TOTAL) BY MOUTH DAILY. (Patient taking differently: TAKE 1 AND HALF TABLET (75 MG TOTAL) BY MOUTH DAILY.) 90 tablet 3     celecoxib (CELEBREX) 200 MG capsule Take 1 capsule (200 mg total) by mouth daily. 30 capsule 2     clobetasol (TEMOVATE) 0.05 % cream Apply to your lower leg daily x2 weeks, sooner if your swelling is reduced. 60 g 0     lisinopril (PRINIVIL,ZESTRIL) 5 MG tablet TAKE 1 TABLET (5 MG TOTAL) BY MOUTH DAILY. 90 tablet 1     warfarin (COUMADIN) 5 MG tablet Take one tablet daily by mouth, as directed.  Dose adjusted based on INR results. 100 tablet 0     Current Facility-Administered Medications   Medication Dose Route Frequency Provider Last Rate Last Dose     lidocaine 2 % jelly (XYLOCAINE)   Topical PRN Nany Levine, CNP   1 application at 12/28/16 1531     Objective:   Vital signs:  /70 (Patient Site: Left Arm, Patient Position: Sitting, Cuff Size: Adult Regular)  Pulse (!) 131  Resp 16  Ht 6' (1.829 m)  Wt 183 lb 14.4 oz (83.4 kg)  BMI 24.94 kg/m2    Physical Exam:  GENERAL APPEARANCE: Alert, cooperative and in no acute distress.   HEENT: No scleral icterus. Oral mucuos membranes pink and moist.   NECK: JVP 6. No Hepatojugular reflux. Thyroid not visualized. No lymphadenopathy   CHEST: clear to auscultation   CARDIOVASCULAR: S1, S2 without murmur ,clicks or rubs. Brachial, radial and posterior tibial pulses are intact and symetric. No carotid bruits noted. No edema  ABDOMEN: Nontender. BS+. No bruits.   SKIN: No Xanthelasma   Musculoskeletal: No cyanosis, clubbing or swelling.    Lab Results:  LIPIDS:  Lab Results   Component Value Date    CHOL 147 12/21/2016     CHOL 176 12/14/2015    CHOL 170 03/31/2015     Lab Results   Component Value Date    HDL 66 12/21/2016    HDL 62 12/14/2015    HDL 56 03/31/2015     Lab Results   Component Value Date    LDLCALC 75 12/21/2016    LDLCALC 101 12/14/2015    LDLCALC 99 03/31/2015     Lab Results   Component Value Date    TRIG 31 12/21/2016    TRIG 66 12/14/2015    TRIG 76 03/31/2015     No components found for: CHOLHDL    BMP:  Lab Results   Component Value Date    CREATININE 0.98 11/01/2017    BUN 25 (H) 11/01/2017     11/01/2017    K 4.3 11/01/2017     11/01/2017    CO2 26 11/01/2017     [facility]

## 2021-06-15 NOTE — TELEPHONE ENCOUNTER
Reason for Call:  Other call back      Detailed comments: verbal orders to hold coumadin for 4 day for colonoscopy scheduled for 3/31-it was rescheduled.    Please call and let them know    Phone Number Patient can be reached at: Home number on file [phone] (home)    Best Time: anyu  Can we leave a detailed message on this number?: Yes    Call taken on 3/10/2021 at 9:22 AM by Pamela Behr

## 2021-06-15 NOTE — TELEPHONE ENCOUNTER
ANTICOAGULATION  MANAGEMENT PROGRAM    Damien Mcclendon is overdue for INR check.     Spoke with Damien and scheduled INR appointment on 3/17 @ [facility].      Rosa Maria Winkler RN

## 2021-06-15 NOTE — TELEPHONE ENCOUNTER
Left [facility] detailed message relaying hold orders below.  Advised to call back with further questions.

## 2021-06-15 NOTE — TELEPHONE ENCOUNTER
Pt calling back, states he missed a call from his clinic today regarding a medication.     RN informed pt that his RX was sent to the pharmacy today.      Patient verbalized understanding and had no further questions.      Vania Carvajal RN, FNA

## 2021-06-15 NOTE — TELEPHONE ENCOUNTER
Reason for Call:  Other call back      Detailed comments: [facility] needs to clarify where to apply the cream that was ordered    Phone Number Patient can be reached at: Other phone number:  [phone]    Best Time: anytime    Can we leave a detailed message on this number?: Yes    Call taken on 2/18/2021 at 11:22 AM by Antonieta Vences

## 2021-06-15 NOTE — PROGRESS NOTES
Office Visit - Physical   Damien Mcclendon   60 y.o.  male    Date of visit: 2/17/2021  Physician: Ronald Webb MD     Assessment and Plan   1. Routine general medical examination at a health care facility  Needs to give up the cigarettes.  Colonoscopy is due.  Bone density will be due later this year.  We discussed shingles vaccination and he is going to hold off on that as he has had shingles in the past.  He is due for a flu shot today.  He has had his Pneumovax.  - Ambulatory referral for Colonoscopy - [facility]  - Comprehensive Metabolic Panel  - Urinalysis-UC if Indicated  - Hepatitis C Antibody (Anti-HCV)  - Lipid Cascade FASTING  - Glycosylated Hemoglobin A1c  - HM2(CBC w/o Differential)  - PSA, Annual Screen (Prostatic-Specific Antigen)    2. Closed nondisplaced fracture of right clavicle with routine healing, unspecified part of clavicle, subsequent encounter  He will follow up with orthopedics.    3. Closed fracture of right scapula with routine healing, unspecified part of scapula, subsequent encounter  We will follow up with orthopedics.    4. Tobacco abuse  Counseled on cessation today.  He is not ready.    5. Thrombocytopenia (H)  Recheck blood counts today.  - HM2(CBC w/o Differential)    6. Chronic pulmonary embolism, unspecified pulmonary embolism type, unspecified whether acute cor pulmonale present (H)  Lifelong anticoagulation.  - INR    7. Prediabetes  A1c today for monitoring.  - Glycosylated Hemoglobin A1c    8. Mixed hyperlipidemia  Labs today for monitoring.  - Lipid Cascade FASTING    9. Macrocytosis  Blood counts will be done today.  Possibly due to alcohol use in the past.  We will check B12 and folate.    10. Essential hypertension, benign  Blood pressures controlled.  Continue regimen.    11. Longstanding persistent atrial fibrillation (H)  Controlled.  On lifelong anticoagulation.  - INR    12. Osteopenia:    Bone density is due in this year.  - DXA Bone Density Scan;  Future    13. Chronic Cutaneous Ulcer Venous Stasis    - triamcinolone (KENALOG) 0.1 % cream; Apply two times a day for 2 weeks at a time.  Dispense: 453.6 g; Refill: 1    14. Male Erectile Disorder  He has not had success with Viagra or other similar medications in the past.  Would consider urologic evaluation if he wishes.  Smoking cessation is urged.    15. Memory Lapses Or Loss  He tells me his memory is stable.  Not an issue at this time.    16. Decreased pedal pulses  He needs to give up the cigarettes.  Check ABIs.  - US Ankle Brachial Indices Pre and Post Exercise; Future        Return in about 6 months (around 8/17/2021) for Recheck.     Chief Complaint   Chief Complaint   Patient presents with     Annual Exam        Patient Profile   Social History     Social History Narrative    Lives with his wife        Past Medical History   Patient Active Problem List   Diagnosis     Tobacco abuse     Avascular Necrosis     Mixed hyperlipidemia     Chronic Cutaneous Ulcer Venous Stasis     Pulmonary embolism (H)     Atrial Fibrillation     Male Erectile Disorder     Memory Lapses Or Loss     Abnormal Weight Loss     History of DVT (deep vein thrombosis)     Hip fracture (H)     Macrocytosis     Prediabetes     Essential hypertension, benign     Age-related osteoporosis with current pathological fracture with routine healing     Thrombocytopenia (H)       Past Surgical History  He has a past surgical history that includes Hernia repair (2012); Joint replacement (Bilateral, 2002); Joint replacement (2007); and Eye surgery (1972).     History of Present Illness   This 60 y.o. old Facundo comes in today for follow-up and for physical.  Very complicated 60-year-old gentleman with multiple medical problems.  Ongoing smoking.  Also with a history of prediabetes, hyperlipidemia, hypertension, recurrent DVTs and atrial fibrillation on chronic anticoagulation.  He slipped on some ice recently and broke his right clavicle and  scapula.  He is getting better from that standpoint following with orthopedics.  He does have a history of low bone mass and is due for bone density later this year.  He is overdue for his colonoscopy.  He is due for a flu shot.  He was back at work part-time prior to his fall.  Now he will be out for a while.  He has chronic venous insufficiency dermatitis/stasis dermatitis of his legs more extremities left greater than right.  He does have some mild claudication symptoms.  Mainly around his ankles he tells me.  Otherwise he denies somatic concerns.    His sister was recently diagnosed with a brain tumor.  He has 1 living brother and two or 3 sisters.    Review of Systems: A comprehensive review of systems was negative except as noted.     Medications and Allergies   Current Outpatient Medications   Medication Sig Dispense Refill     blood pressure test kit-medium Kit Check blood pressure and heart rate once daily. 1 each 0     diltiazem (CARDIZEM CD) 120 MG 24 hr capsule Take 1 capsule (120 mg total) by mouth daily. 90 capsule 1     lisinopriL (PRINIVIL,ZESTRIL) 5 MG tablet Take 1 tablet (5 mg total) by mouth daily. 90 tablet 1     metoprolol succinate (TOPROL-XL) 200 MG 24 hr tablet Take 1 tablet (200 mg total) by mouth daily. 90 tablet 1     rosuvastatin (CRESTOR) 10 MG tablet TAKE 1 TABLET BY MOUTH EVERYDAY AT BEDTIME 90 tablet 1     warfarin ANTICOAGULANT (COUMADIN/JANTOVEN) 5 MG tablet TAKE 1/2 - 1 TABLET BY MOUTH DAILY, AS DIRECTED. ADJUST DOSE BASED ON INR RESULTS. 90 tablet 1     triamcinolone (KENALOG) 0.1 % cream Apply two times a day for 2 weeks at a time. 453.6 g 1     Current Facility-Administered Medications   Medication Dose Route Frequency Provider Last Rate Last Admin     lidocaine 2 % jelly (XYLOCAINE)   Topical PRN Nany Levine, CNP   1 application at 12/28/16 1531     Allergies   Allergen Reactions     Pravastatin      Memory changes, feels foggy         Family and Social History    Family History   Problem Relation Age of Onset     Diabetes Mother      Heart disease Mother      Obesity Mother      Heart disease Father      Varicose Veins Father      No Medical Problems Daughter         Social History     Tobacco Use     Smoking status: Current Every Day Smoker     Packs/day: 0.50     Smokeless tobacco: Never Used   Substance Use Topics     Alcohol use: Yes     Alcohol/week: 2.0 standard drinks     Types: 2 Cans of beer per week     Comment: drinks 1-2 times per week.     Drug use: No        Physical Exam   General Appearance:   Pleasant gentleman who smells heavily of cigarette smoke.    /60 (Patient Site: Left Arm, Patient Position: Sitting, Cuff Size: Adult Regular)   Pulse 98   Ht 6' (1.829 m)   Wt 178 lb 6.4 oz (80.9 kg)   SpO2 98%   BMI 24.20 kg/m      EYES: Eyelids, conjunctiva, and sclera were normal. Pupils were normal. Cornea, iris, and lens were normal bilaterally.  HEAD, EARS, NOSE, MOUTH, AND THROAT: Head and face were normal. Hearing was normal to voice and the ears were normal to external exam.   NECK: Neck appearance was normal. There were no neck masses and the thyroid was not enlarged.  RESPIRATORY: Breathing pattern was normal and the chest moved symmetrically.    Lung sounds were mildly diminished bilaterally.  And there were no abnormal sounds.  CARDIOVASCULAR: Heart is irregularly irregular.  No lower extremity edema.  Has bilateral varicosities as well as venous stasis changes to the skin as well as venous stasis dermatitis with some excoriations especially in the left lower extremity.  No palpable pulses in the feet today.  GASTROINTESTINAL: The abdomen was normal in contour.  Bowel sounds were present.  Percussion detected no organ enlargement or tenderness.  Palpation detected no tenderness, mass, or enlarged organs.   MUSCULOSKELETAL: Skeletal configuration was normal and muscle mass was normal for age. Joint appearance was overall normal.  LYMPHATIC:  There were no enlarged nodes.  SKIN/HAIR/NAILS: Hands and feet were slightly cyanotic in appearance.  Cool to touch.  Good capillary refill.  NEUROLOGIC: The patient was alert and oriented to person, place, time, and circumstance. Speech was normal. Cranial nerves were normal. Motor strength was normal for age. The patient was normally coordinated.  PSYCHIATRIC:  Mood and affect were normal and the patient had normal recent and remote memory. The patient's judgment and insight were normal.    ADDITIONAL VITAL SIGNS: n/a  CHEST WALL/BREASTS: nml    RECTAL: nml tone and prostate.  GENITAL/URINARY: nml     Additional Information        Ronald Webb MD  Internal Medicine  Contact me at [phone]

## 2021-06-16 PROBLEM — R73.03 PREDIABETES: Status: ACTIVE | Noted: 2019-05-02

## 2021-06-16 PROBLEM — D69.6 THROMBOCYTOPENIA (H): Status: ACTIVE | Noted: 2020-02-12

## 2021-06-16 PROBLEM — I10 ESSENTIAL HYPERTENSION, BENIGN: Status: ACTIVE | Noted: 2019-11-11

## 2021-06-16 PROBLEM — D75.89 MACROCYTOSIS: Status: ACTIVE | Noted: 2018-11-01

## 2021-06-16 PROBLEM — M80.00XD AGE-RELATED OSTEOPOROSIS WITH CURRENT PATHOLOGICAL FRACTURE WITH ROUTINE HEALING: Status: ACTIVE | Noted: 2020-01-07

## 2021-06-16 NOTE — TELEPHONE ENCOUNTER
RN cannot approve Refill Request    RN can NOT refill this medication med is not covered by policy/route to provider. Last office visit: 8/4/2020 Ronald Webb MD Last Physical: 2/17/2021 Last MTM visit: Visit date not found Last visit same specialty: 8/4/2020 Ronald Webb MD.  Next visit within 3 mo: Visit date not found  Next physical within 3 mo: Visit date not found      Kristina Henry, [facility] Triage/Med Refill 4/15/2021    Requested Prescriptions   Pending Prescriptions Disp Refills     celecoxib (CELEBREX) 200 MG capsule [Pharmacy Med Name: Celecoxib Oral Capsule 200 MG] 30 capsule 0     Sig: TAKE ONE CAPSULE BY MOUTH ONE TIME DAILY       There is no refill protocol information for this order

## 2021-06-16 NOTE — TELEPHONE ENCOUNTER
Telephone Encounter by Rosa Maria Winkler RN at 3/24/2020 10:56 AM     Author: Rosa Maria Winkler RN Service: -- Author Type: Registered Nurse    Filed: 3/24/2020 11:05 AM Encounter Date: 3/24/2020 Status: Attested    : Rosa Maria Winkler RN (Registered Nurse) Cosigner: Ronald Webb MD at 3/24/2020 11:06 AM    Attestation signed by Ronald Webb MD at 3/24/2020 11:06 AM    agree                ANTICOAGULATION  MANAGEMENT    Assessment     Today's INR result of 2.40 is Therapeutic (goal INR of 2.0-3.0)     INR result tested @ [facility].      Warfarin taken as previously instructed    No new diet changes affecting INR    No new medication/supplements affecting INR    Continues to tolerate warfarin with no reported s/s of bleeding or thromboembolism     Previous INR was Therapeutic at 2.40 on 2/12/20.    NOTE:  Damien was scheduled for colonoscopy on 3/11/20 with [facility].  However, Damien did not hold his warfarin.  (No call from [facility] for patient's prep to ACN or Dr. Webb, and Damien reported not getting informed from [facility] to hold his warfarin).    Damien reported, he just got layed off from work and will not be able to reschedule colonoscopy.    Plan:     Spoke with Damien regarding INR result and instructed:     Warfarin Dosing Instructions:  (has 5mg tabs)   - Continue current warfarin dose 5 mg daily.    Instructed patient to follow up no later than:  4-6 wks.    Education provided: importance of consistent vitamin K intake, target INR goal and significance of current INR result, importance of taking warfarin as instructed and monitoring for bleeding signs and symptoms    Lenardjosebess verbalizes understanding and agrees to warfarin dosing plan.    Instructed to call the [facility] for any changes, questions or concerns. (#[phone])   ?   Rosa Maria Winkler RN    Subjective/Objective:      Damien Mcclendon, a 59 y.o. male is on warfarin.     Damien reports:     Home warfarin dose: verbally confirmed home  dose with Damien and updated on anticoagulation calendar     Missed doses: No     Medication changes:  No     S/S of bleeding or thromboembolism:  No     New Injury or illness:  No     Changes in diet or alcohol consumption:  No     Upcoming surgery, procedure or cardioversion:  No    Anticoagulation Episode Summary     Current INR goal:   2.0-3.0   TTR:   58.3 % (11.7 mo)   Next INR check:   5/5/2020   INR from last check:   2.40 (3/11/2020)   Weekly max warfarin dose:      Target end date:      INR check location:      Preferred lab:      Send INR reminders to:   [facility]    Indications    History of DVT (deep vein thrombosis) [Z86.718]           Comments:            Anticoagulation Care Providers     Provider Role Specialty Phone number    Ronald Webb MD Referring Internal Medicine [phone]

## 2021-06-16 NOTE — TELEPHONE ENCOUNTER
ANTICOAGULATION  MANAGEMENT    Assessment     Today's INR result of 2.70 is Therapeutic (goal INR of 2.0-3.0)        Warfarin taken as previously instructed    No new diet changes affecting INR    No new medication/supplements affecting INR    Continues to tolerate warfarin with no reported s/s of bleeding or thromboembolism     Previous INR was Subtherapeutic at 1.80 on 4/5/21.    S/p Colonoscopy with one polypectomy on 3/31/21.    Taking a little trip on 5/9-11.    Plan:     Spoke on phone with Damien regarding INR result and instructed:      Warfarin Dosing Instructions:  (evenings. 5mg tabs)   - Continue current warfarin dose 5 mg daily on Mon/Wed/Fri; and 2.5 mg daily rest of week.    Instructed patient to follow up no later than:  2 wks.    Education provided: importance of consistent vitamin K intake, target INR goal and significance of current INR result and importance of notifying clinic for changes in medications    Damien verbalizes understanding and agrees to warfarin dosing plan.    Instructed to call the [facility] for any changes, questions or concerns. (#[phone])   ?   Roas Maria Winkler RN    Subjective/Objective:      Damien RAMIREZ Hakan, a 60 y.o. male is on warfarin. Damien Quezada reports:     Home warfarin dose: as updated on anticoagulation calendar per template     Missed doses: No     Medication changes:  No     S/S of bleeding or thromboembolism:  No     New Injury or illness:  No     Changes in diet or alcohol consumption:  No     Upcoming surgery, procedure or cardioversion:  No    Anticoagulation Episode Summary     Current INR goal:  2.0-3.0   TTR:  24.5 % (8.6 mo)   Next INR check:  5/3/2021   INR from last check:  2.70 (4/19/2021)   Weekly max warfarin dose:     Target end date:     INR check location:     Preferred lab:     Send INR reminders to:  [facility]    Indications    History of DVT (deep vein thrombosis) [Z95.667]           Comments:           Anticoagulation Care  Providers     Provider Role Specialty Phone number    Ronald Webb MD Referring Internal Medicine [phone]

## 2021-06-17 NOTE — TELEPHONE ENCOUNTER
ANTICOAGULATION  MANAGEMENT PROGRAM    Damien Mcclendon is overdue for INR check.     Spoke with Damien and scheduled INR appointment on 5/17 @ [facility].      Rosa Maria Winkler RN

## 2021-06-17 NOTE — TELEPHONE ENCOUNTER
Telephone Encounter by Rosa Maria Winkler RN at 2/18/2021  8:30 AM     Author: Rosa Maria Winkler RN Service: -- Author Type: Registered Nurse    Filed: 2/18/2021 10:48 AM Encounter Date: 2/18/2021 Status: Signed    : Rosa Maria Winkler RN (Registered Nurse)       ANTICOAGULATION  MANAGEMENT    Assessment     Today's INR result of 1.80 is Subtherapeutic (goal INR of 2.0-3.0)        Warfarin taken differently than instructed, but no impact to total weekly dose    No new diet changes affecting INR    Potential interaction between Triamcinolone and warfarin which may affect subsequent INRs    2/17/21 started on Triamcinolone cream two times a day for 2 wks, for skin ulcer venous stasis.    Continues to tolerate warfarin with no reported s/s of bleeding or thromboembolism     Previous INR was Therapeutic at 2.00 from 1/24/21.    Plan:     Spoke on phone with Damien regarding INR result and instructed:      Warfarin Dosing Instructions:    - Change warfarin dose to 2.5 mg daily on Mon/Wed/Fri; and 5 mg daily rest of week.   - (10 % change)    Instructed patient to follow up no later than:  1-2 wks.    Education provided: importance of consistent vitamin K intake, target INR goal and significance of current INR result, potential interaction between warfarin and Triamcinolone cream and importance of notifying clinic for changes in medications    Damien verbalizes understanding and agrees to warfarin dosing plan.    Instructed to call the [facility] for any changes, questions or concerns. (#[phone])   ?   Rosa Maria Winkler RN    Subjective/Objective:      Damien Mcclendon, a 60 y.o. male is on warfarin. Damien Quezada reports:     Home warfarin dose: template incorrect; verbally confirmed home dose with Damien and updated on anticoagulation calendar     Missed doses: No     Medication changes:  Yes: Triamcinolone cream     S/S of bleeding or thromboembolism:  No     New Injury or illness:  No      Changes in diet or alcohol consumption:  No     Upcoming surgery, procedure or cardioversion:  No    Anticoagulation Episode Summary     Current INR goal:  2.0-3.0   TTR:  33.5 % (7.3 mo)   Next INR check:  3/4/2021   INR from last check:  1.80 (2/17/2021)   Weekly max warfarin dose:     Target end date:     INR check location:     Preferred lab:     Send INR reminders to:  [facility]    Indications    History of DVT (deep vein thrombosis) [Z86.718]           Comments:           Anticoagulation Care Providers     Provider Role Specialty Phone number    Ronald Webb MD Referring Internal Medicine [phone]

## 2021-06-17 NOTE — TELEPHONE ENCOUNTER
Telephone Encounter by Rosa Maria Winkler RN at 11/5/2020  3:23 PM     Author: Rosa Maria Winkler RN Service: -- Author Type: Registered Nurse    Filed: 11/5/2020  3:57 PM Encounter Date: 11/5/2020 Status: Signed    : Rosa Maria Winkler RN (Registered Nurse)       ANTICOAGULATION  MANAGEMENT    Assessment     Today's INR result of 3.10 is Supratherapeutic (goal INR of 2.0-3.0)        Warfarin taken differently than instructed, but no impact to total weekly dose    - reported holding warfarin dose on 10/29, as instructed.    No new diet changes affecting INR    No new medication/supplements affecting INR    Continues to tolerate warfarin with no reported s/s of bleeding or thromboembolism     Previous INR was Supratherapeutic at 4.20 on 10/29/20.    Plan:     Spoke on phone with Damien regarding INR result and instructed:     Warfarin Dosing Instructions:    - Continue current warfarin dose 5 mg daily on Saturdays ; and 2.5 mg daily rest of week.    Instructed patient to follow up no later than:  1-2 wks.    Education provided: importance of consistent vitamin K intake and target INR goal and significance of current INR result    Damien verbalizes understanding and agrees to warfarin dosing plan.    Instructed to call the [facility] for any changes, questions or concerns. (#[phone])   ?   Rosa Maria Winkler RN    Subjective/Objective:      Damien Mcclendon, a 60 y.o. male is on warfarin.     Damien reports:     Home warfarin dose: template incorrect; verbally confirmed home dose with Damien and updated on anticoagulation calendar - will update Anticoagulation calendar.     Missed doses: No.  Held dose on 10/29/20.     Medication changes:  No     S/S of bleeding or thromboembolism:  No     New Injury or illness:  No     Changes in diet or alcohol consumption:  No     Upcoming surgery, procedure or cardioversion:  No    Anticoagulation Episode Summary     Current INR goal:  2.0-3.0   TTR:  63.2 % (7.3  mo)   Next INR check:  11/19/2020   INR from last check:  3.10 (11/5/2020)   Weekly max warfarin dose:     Target end date:     INR check location:     Preferred lab:     Send INR reminders to:  [facility]    Indications    History of DVT (deep vein thrombosis) [Z86.718]           Comments:           Anticoagulation Care Providers     Provider Role Specialty Phone number    Ronald Webb MD Referring Internal Medicine [phone]

## 2021-06-17 NOTE — TELEPHONE ENCOUNTER
Telephone Encounter by Leonor Whitfield, RN at 4/5/2021 12:07 PM     Author: Leonor Whitfield RN Service: -- Author Type: Registered Nurse    Filed: 4/5/2021 12:16 PM Encounter Date: 4/5/2021 Status: Signed    : Leonor Whitfield, RN (Registered Nurse)       ANTICOAGULATION  MANAGEMENT    Assessment     Today's INR result of 1.8 is Subtherapeutic (goal INR of 2.0-3.0)        Warfarin recently held as instructed which may be affecting INR (colonoscopy last week)    No new diet changes affecting INR    No new medication/supplements affecting INR    Continues to tolerate warfarin with no reported s/s of bleeding or thromboembolism     Previous INR was Subtherapeutic       Plan:     Spoke on phone with Damien regarding INR result and instructed:      Warfarin Dosing Instructions:  Change warfarin dose to    5 mg every Mon, Wed, Fri; 2.5 mg all other days         Instructed patient to follow up no later than: 1-2 weeks    Education provided: target INR goal and significance of current INR result    Damien verbalizes understanding and agrees to warfarin dosing plan.    Instructed to call the [facility] for any changes, questions or concerns. (#[phone])   ?   Leonor Whitfield RN    Subjective/Objective:      Damien Mcclendon, a 60 y.o. male is on warfarin. Damien Quezada reports:     Home warfarin dose: verbally confirmed home dose with Damien and updated on anticoagulation calendar     Missed doses: No     Medication changes:  No     S/S of bleeding or thromboembolism:  No     New Injury or illness:  No     Changes in diet or alcohol consumption:  No     Upcoming surgery, procedure or cardioversion:  No    Anticoagulation Episode Summary     Current INR goal:  2.0-3.0   TTR:  21.4 % (8.1 mo)   Next INR check:  4/19/2021   INR from last check:  1.80 (4/5/2021)   Weekly max warfarin dose:     Target end date:     INR check location:     Preferred lab:     Send INR reminders to:  [facility]     Indications    History of DVT (deep vein thrombosis) [Z86.718]           Comments:           Anticoagulation Care Providers     Provider Role Specialty Phone number    Ronald Webb MD Referring Internal Medicine [phone]

## 2021-06-17 NOTE — TELEPHONE ENCOUNTER
Telephone Encounter by Rubin Lundberg, RN at 2/4/2021  9:07 AM     Author: Rubin Lundberg RN Service: -- Author Type: Registered Nurse    Filed: 2/4/2021  9:09 AM Encounter Date: 2/4/2021 Status: Signed    : Rubin Lundberg, RN (Registered Nurse)       Refill Approved    Rx renewed per Medication Renewal Policy. Medication was last renewed on 2/12/20.    Rubin Lundberg, [facility] Triage/Med Refill 2/4/2021     Requested Prescriptions   Pending Prescriptions Disp Refills   ? rosuvastatin (CRESTOR) 10 MG tablet [Pharmacy Med Name: ROSUVASTATIN CALCIUM 10 MG TAB] 90 tablet 3     Sig: TAKE 1 TABLET BY MOUTH EVERYDAY AT BEDTIME       Statins Refill Protocol (Hmg CoA Reductase Inhibitors) Passed - 2/4/2021 12:18 AM        Passed - PCP or prescribing provider visit in past 12 months      Last office visit with prescriber/PCP: 8/4/2020 Ronald Webb MD OR same dept: 8/4/2020 Ronald Webb MD OR same specialty: 8/4/2020 Ronald Webb MD  Last physical: 1/7/2020 Last MTM visit: Visit date not found   Next visit within 3 mo: Visit date not found  Next physical within 3 mo: Visit date not found  Prescriber OR PCP: Ronald Webb MD  Last diagnosis associated with med order: 1. Mixed hyperlipidemia  - rosuvastatin (CRESTOR) 10 MG tablet [Pharmacy Med Name: ROSUVASTATIN CALCIUM 10 MG TAB]; TAKE 1 TABLET BY MOUTH EVERYDAY AT BEDTIME  Dispense: 90 tablet; Refill: 3    2. History of DVT (deep vein thrombosis)  - warfarin ANTICOAGULANT (COUMADIN/JANTOVEN) 5 MG tablet [Pharmacy Med Name: WARFARIN SODIUM 5 MG TABLET]; TAKE 1/2 - 1 TABLET BY MOUTH DAILY, AS DIRECTED. ADJUST DOSE BASED ON INR RESULTS.  Dispense: 90 tablet; Refill: 1    3. Atrial fibrillation (H)  - warfarin ANTICOAGULANT (COUMADIN/JANTOVEN) 5 MG tablet [Pharmacy Med Name: WARFARIN SODIUM 5 MG TABLET]; TAKE 1/2 - 1 TABLET BY MOUTH DAILY, AS DIRECTED. ADJUST DOSE BASED ON INR RESULTS.  Dispense: 90 tablet; Refill: 1    4. Post Septic Embolism  - warfarin  ANTICOAGULANT (COUMADIN/JANTOVEN) 5 MG tablet [Pharmacy Med Name: WARFARIN SODIUM 5 MG TABLET]; TAKE 1/2 - 1 TABLET BY MOUTH DAILY, AS DIRECTED. ADJUST DOSE BASED ON INR RESULTS.  Dispense: 90 tablet; Refill: 1    5. Long term current use of anticoagulant therapy  - warfarin ANTICOAGULANT (COUMADIN/JANTOVEN) 5 MG tablet [Pharmacy Med Name: WARFARIN SODIUM 5 MG TABLET]; TAKE 1/2 - 1 TABLET BY MOUTH DAILY, AS DIRECTED. ADJUST DOSE BASED ON INR RESULTS.  Dispense: 90 tablet; Refill: 1    If protocol passes may refill for 12 months if within 3 months of last provider visit (or a total of 15 months).              ? warfarin ANTICOAGULANT (COUMADIN/JANTOVEN) 5 MG tablet [Pharmacy Med Name: WARFARIN SODIUM 5 MG TABLET] 90 tablet 1     Sig: TAKE 1/2 - 1 TABLET BY MOUTH DAILY, AS DIRECTED. ADJUST DOSE BASED ON INR RESULTS.       Warfarin Refill Protocol  Failed - 2/4/2021 12:18 AM        Failed -  Route to appropriate pool/provider     Last Anticoagulation Summary:   Anticoagulation Episode Summary     Current INR goal:  2.0-3.0   TTR:  44.0 % (6.6 mo)   Next INR check:  1/28/2021   INR from last check:  1.60 (1/14/2021)   Weekly max warfarin dose:     Target end date:     INR check location:     Preferred lab:     Send INR reminders to:  [facility] [place]    Indications    History of DVT (deep vein thrombosis) [Z86.718]           Comments:           Anticoagulation Care Providers     Provider Role Specialty Phone number    Ronald Webb MD Referring Internal Medicine [phone]                Passed - Provider visit in last year     Last office visit with prescriber/PCP: 8/4/2020 Ronald Webb MD OR same dept: 8/4/2020 Ronald Webb MD OR same specialty: 8/4/2020 Ronald Webb MD  Last physical: 1/7/2020 Last MTM visit: Visit date not found    Next appt within 3 mo: Visit date not found Next physical within 3 mo: Visit date not found  Prescriber OR PCP: Ronald Webb MD  Last diagnosis associated with  med order: 1. Mixed hyperlipidemia  - rosuvastatin (CRESTOR) 10 MG tablet [Pharmacy Med Name: ROSUVASTATIN CALCIUM 10 MG TAB]; TAKE 1 TABLET BY MOUTH EVERYDAY AT BEDTIME  Dispense: 90 tablet; Refill: 3    2. History of DVT (deep vein thrombosis)  - warfarin ANTICOAGULANT (COUMADIN/JANTOVEN) 5 MG tablet [Pharmacy Med Name: WARFARIN SODIUM 5 MG TABLET]; TAKE 1/2 - 1 TABLET BY MOUTH DAILY, AS DIRECTED. ADJUST DOSE BASED ON INR RESULTS.  Dispense: 90 tablet; Refill: 1    3. Atrial fibrillation (H)  - warfarin ANTICOAGULANT (COUMADIN/JANTOVEN) 5 MG tablet [Pharmacy Med Name: WARFARIN SODIUM 5 MG TABLET]; TAKE 1/2 - 1 TABLET BY MOUTH DAILY, AS DIRECTED. ADJUST DOSE BASED ON INR RESULTS.  Dispense: 90 tablet; Refill: 1    4. Post Septic Embolism  - warfarin ANTICOAGULANT (COUMADIN/JANTOVEN) 5 MG tablet [Pharmacy Med Name: WARFARIN SODIUM 5 MG TABLET]; TAKE 1/2 - 1 TABLET BY MOUTH DAILY, AS DIRECTED. ADJUST DOSE BASED ON INR RESULTS.  Dispense: 90 tablet; Refill: 1    5. Long term current use of anticoagulant therapy  - warfarin ANTICOAGULANT (COUMADIN/JANTOVEN) 5 MG tablet [Pharmacy Med Name: WARFARIN SODIUM 5 MG TABLET]; TAKE 1/2 - 1 TABLET BY MOUTH DAILY, AS DIRECTED. ADJUST DOSE BASED ON INR RESULTS.  Dispense: 90 tablet; Refill: 1    If protocol passes may refill for 6 months if within 3 months of last provider visit (or a total of 9 months).

## 2021-06-17 NOTE — TELEPHONE ENCOUNTER
Telephone Encounter by Rosa Maria Winkler RN at 11/20/2020  8:37 AM     Author: Rosa Maria Winkler RN Service: -- Author Type: Registered Nurse    Filed: 11/20/2020 10:45 AM Encounter Date: 11/19/2020 Status: Signed    : Rosa Maria Winkler RN (Registered Nurse)       ANTICOAGULATION  MANAGEMENT    Assessment     Today's INR result of 1.80 is Subtherapeutic (goal INR of 2.0-3.0)        Warfarin taken as previously instructed    No new diet changes affecting INR    No new medication/supplements affecting INR    Continues to tolerate warfarin with no reported s/s of bleeding or thromboembolism     Previous INR was Supratherapeutic at 3.10 on 11/5/20.    Plan:     Spoke on phone with Damien regarding INR result and instructed:     Warfarin Dosing Instructions:  (evenings. Has 5mg tabs)   - reported taking 5mg warfarin dose last night, as instructed.   - Change warfarin dose to 5 mg daily on Mon/Thurs; and 2.5 mg daily rest of week.   - (12.5 % change)    Instructed patient to follow up no later than:  1-2 wks.   - INR scheduled on 12/10/20 @ [facility].    Education provided: target INR goal and significance of current INR result    Damien verbalizes understanding and agrees to warfarin dosing plan.    Instructed to call the [facility] for any changes, questions or concerns. (#[phone])   ?   Rosa Maria Winkler RN    Subjective/Objective:      Damien RAMIREZ Hakan, a 60 y.o. male is on warfarin.     Damien reports:     Home warfarin dose: verbally confirmed home dose with Damien and updated on anticoagulation calendar     Missed doses: No     Medication changes:  No     S/S of bleeding or thromboembolism:  No     New Injury or illness:  No     Changes in diet or alcohol consumption:  No     Upcoming surgery, procedure or cardioversion:  No    Anticoagulation Episode Summary     Current INR goal:  2.0-3.0   TTR:  61.5 % (7.3 mo)   Next INR check:  12/3/2020   INR from last check:  1.80 (11/19/2020)   Weekly max  warfarin dose:     Target end date:     INR check location:     Preferred lab:     Send INR reminders to:  [facility]    Indications    History of DVT (deep vein thrombosis) [Z86.029]           Comments:           Anticoagulation Care Providers     Provider Role Specialty Phone number    Ronald Webb MD Referring Internal Medicine [phone]

## 2021-06-17 NOTE — TELEPHONE ENCOUNTER
Telephone Encounter by Rubin Lundberg RN at 2/4/2021  9:09 AM     Author: Rubin Lundberg RN Service: -- Author Type: Registered Nurse    Filed: 2/4/2021  9:09 AM Encounter Date: 2/4/2021 Status: Signed    : Rubin Lundberg RN (Registered Nurse)       RN cannot approve Refill Request    RN can NOT refill this medication Protocol failed and NO refill given. Last office visit: 8/4/2020 Ronald Webb MD Last Physical: 1/7/2020 Last MTM visit: Visit date not found Last visit same specialty: 8/4/2020 Ronald Webb MD.  Next visit within 3 mo: Visit date not found  Next physical within 3 mo: Visit date not found      [facility] Connection Triage/Med Refill 2/4/2021    Requested Prescriptions   Pending Prescriptions Disp Refills   ? warfarin ANTICOAGULANT (COUMADIN/JANTOVEN) 5 MG tablet [Pharmacy Med Name: WARFARIN SODIUM 5 MG TABLET] 90 tablet 1     Sig: TAKE 1/2 - 1 TABLET BY MOUTH DAILY, AS DIRECTED. ADJUST DOSE BASED ON INR RESULTS.       Warfarin Refill Protocol  Failed - 2/4/2021 12:18 AM        Failed -  Route to appropriate pool/provider     Last Anticoagulation Summary:   Anticoagulation Episode Summary     Current INR goal:  2.0-3.0   TTR:  44.0 % (6.6 mo)   Next INR check:  1/28/2021   INR from last check:  1.60 (1/14/2021)   Weekly max warfarin dose:     Target end date:     INR check location:     Preferred lab:     Send INR reminders to:  [facility]    Indications    History of DVT (deep vein thrombosis) [Z86.718]           Comments:           Anticoagulation Care Providers     Provider Role Specialty Phone number    Ronald Webb MD Referring Internal Medicine [phone]                Passed - Provider visit in last year     Last office visit with prescriber/PCP: 8/4/2020 Ronald Webb MD OR same dept: 8/4/2020 Ronald Webb MD OR same specialty: 8/4/2020 Ronald Webb MD  Last physical: 1/7/2020 Last MTM visit: Visit date not found    Next appt within 3 mo: Visit date  not found Next physical within 3 mo: Visit date not found  Prescriber OR PCP: Ronald Webb MD  Last diagnosis associated with med order: 1. Mixed hyperlipidemia  - rosuvastatin (CRESTOR) 10 MG tablet; TAKE 1 TABLET BY MOUTH EVERYDAY AT BEDTIME  Dispense: 90 tablet; Refill: 1    2. History of DVT (deep vein thrombosis)  - warfarin ANTICOAGULANT (COUMADIN/JANTOVEN) 5 MG tablet [Pharmacy Med Name: WARFARIN SODIUM 5 MG TABLET]; TAKE 1/2 - 1 TABLET BY MOUTH DAILY, AS DIRECTED. ADJUST DOSE BASED ON INR RESULTS.  Dispense: 90 tablet; Refill: 1    3. Atrial fibrillation (H)  - warfarin ANTICOAGULANT (COUMADIN/JANTOVEN) 5 MG tablet [Pharmacy Med Name: WARFARIN SODIUM 5 MG TABLET]; TAKE 1/2 - 1 TABLET BY MOUTH DAILY, AS DIRECTED. ADJUST DOSE BASED ON INR RESULTS.  Dispense: 90 tablet; Refill: 1    4. Post Septic Embolism  - warfarin ANTICOAGULANT (COUMADIN/JANTOVEN) 5 MG tablet [Pharmacy Med Name: WARFARIN SODIUM 5 MG TABLET]; TAKE 1/2 - 1 TABLET BY MOUTH DAILY, AS DIRECTED. ADJUST DOSE BASED ON INR RESULTS.  Dispense: 90 tablet; Refill: 1    5. Long term current use of anticoagulant therapy  - warfarin ANTICOAGULANT (COUMADIN/JANTOVEN) 5 MG tablet [Pharmacy Med Name: WARFARIN SODIUM 5 MG TABLET]; TAKE 1/2 - 1 TABLET BY MOUTH DAILY, AS DIRECTED. ADJUST DOSE BASED ON INR RESULTS.  Dispense: 90 tablet; Refill: 1    If protocol passes may refill for 6 months if within 3 months of last provider visit (or a total of 9 months).           Signed Prescriptions Disp Refills    rosuvastatin (CRESTOR) 10 MG tablet 90 tablet 1     Sig: TAKE 1 TABLET BY MOUTH EVERYDAY AT BEDTIME       Statins Refill Protocol (Hmg CoA Reductase Inhibitors) Passed - 2/4/2021 12:18 AM        Passed - PCP or prescribing provider visit in past 12 months      Last office visit with prescriber/PCP: 8/4/2020 Ronald Webb MD OR same dept: 8/4/2020 Ronald Webb MD OR same specialty: 8/4/2020 Ronald Webb MD  Last physical: 1/7/2020 Last MTM visit: Visit  date not found   Next visit within 3 mo: Visit date not found  Next physical within 3 mo: Visit date not found  Prescriber OR PCP: Ronald Webb MD  Last diagnosis associated with med order: 1. Mixed hyperlipidemia  - rosuvastatin (CRESTOR) 10 MG tablet; TAKE 1 TABLET BY MOUTH EVERYDAY AT BEDTIME  Dispense: 90 tablet; Refill: 1    2. History of DVT (deep vein thrombosis)  - warfarin ANTICOAGULANT (COUMADIN/JANTOVEN) 5 MG tablet [Pharmacy Med Name: WARFARIN SODIUM 5 MG TABLET]; TAKE 1/2 - 1 TABLET BY MOUTH DAILY, AS DIRECTED. ADJUST DOSE BASED ON INR RESULTS.  Dispense: 90 tablet; Refill: 1    3. Atrial fibrillation (H)  - warfarin ANTICOAGULANT (COUMADIN/JANTOVEN) 5 MG tablet [Pharmacy Med Name: WARFARIN SODIUM 5 MG TABLET]; TAKE 1/2 - 1 TABLET BY MOUTH DAILY, AS DIRECTED. ADJUST DOSE BASED ON INR RESULTS.  Dispense: 90 tablet; Refill: 1    4. Post Septic Embolism  - warfarin ANTICOAGULANT (COUMADIN/JANTOVEN) 5 MG tablet [Pharmacy Med Name: WARFARIN SODIUM 5 MG TABLET]; TAKE 1/2 - 1 TABLET BY MOUTH DAILY, AS DIRECTED. ADJUST DOSE BASED ON INR RESULTS.  Dispense: 90 tablet; Refill: 1    5. Long term current use of anticoagulant therapy  - warfarin ANTICOAGULANT (COUMADIN/JANTOVEN) 5 MG tablet [Pharmacy Med Name: WARFARIN SODIUM 5 MG TABLET]; TAKE 1/2 - 1 TABLET BY MOUTH DAILY, AS DIRECTED. ADJUST DOSE BASED ON INR RESULTS.  Dispense: 90 tablet; Refill: 1    If protocol passes may refill for 12 months if within 3 months of last provider visit (or a total of 15 months).

## 2021-06-17 NOTE — TELEPHONE ENCOUNTER
ANTICOAGULATION  MANAGEMENT    Assessment     Today's INR result of 2.50 is Therapeutic (goal INR of 2.0-3.0)        Warfarin taken as previously instructed    No new diet changes affecting INR    No new medication/supplements affecting INR    Continues to tolerate warfarin with no reported s/s of bleeding or thromboembolism     Previous INR was Therapeutic at 2.70 on 4/19/21.    Plan:     Left detailed message for Damien regarding INR result and instructed:   - advised to call back with any questions or concerns.      Warfarin Dosing Instructions:   (evenings. 5mg tabs)   - Continue current warfarin dose 5 mg daily on Mon/Wed/Fri; and 2.5 mg daily rest of week.    Instructed patient to follow up no later than:  4 wks.    Education provided: importance of consistent vitamin K intake, target INR goal and significance of current INR result and monitoring for bleeding signs and symptoms    Instructed to call the [facility] for any changes, questions or concerns. (#[phone])   ?   Rosa Maria Winkler RN    Subjective/Objective:      Damien RAMIREZ Hakan, a 61 y.o. male is on warfarin. Damien Quezada reports:     Home warfarin dose: as updated on anticoagulation calendar per template     Missed doses: No     Medication changes:  No     S/S of bleeding or thromboembolism:  No     New Injury or illness:  No     Changes in diet or alcohol consumption:  No     Upcoming surgery, procedure or cardioversion:  No    Anticoagulation Episode Summary     Current INR goal:  2.0-3.0   TTR:  31.9 % (9.5 mo)   Next INR check:  6/14/2021   INR from last check:  2.50 (5/17/2021)   Weekly max warfarin dose:     Target end date:     INR check location:     Preferred lab:     Send INR reminders to:  [facility]    Indications    History of DVT (deep vein thrombosis) [Z86.718]           Comments:           Anticoagulation Care Providers     Provider Role Specialty Phone number    Ronald Webb MD Referring Internal Medicine  [phone]

## 2021-06-17 NOTE — TELEPHONE ENCOUNTER
Telephone Encounter by Rosa Maria Winkler RN at 10/9/2020  9:56 AM     Author: Rosa Maria Winkler RN Service: -- Author Type: Registered Nurse    Filed: 10/9/2020 10:07 AM Encounter Date: 10/8/2020 Status: Signed    : Rosa Maria Winkler RN (Registered Nurse)       ANTICOAGULATION  MANAGEMENT    Assessment     Today's INR result of 4.30 is Supratherapeutic (goal INR of 2.0-3.0)        Warfarin taken as previously instructed    No new diet changes affecting INR    Potential interaction between Celebrex and warfarin which may affect subsequent INRs    - stopped Celebrex per Dr. Webb    Continues to tolerate warfarin with no reported s/s of bleeding or thromboembolism     Previous INR was Therapeutic at 2.60 on 9/2/20.    Plan:     Spoke on phone with Damien regarding INR result and instructed:     Warfarin Dosing Instructions:   (has 5mg tabs)   - HELD warfarin dose last night, 10/8.   - advised to HOLD warfarin also tonight, 10/9.   - then change warfarin dose to  2.5 mg daily on Sun/Tues/Thurs; and 5 mg daily rest of week.   - (8.3 % change)    Instructed patient to follow up no later than: 7-10 days.   - INR scheduled on 10/21/20 @ [facility].    Education provided: target INR goal and significance of current INR result, potential interaction between warfarin and Celebrex discontinued and importance of notifying clinic for changes in medications    Damien verbalizes understanding and agrees to warfarin dosing plan.    Instructed to call the [facility] for any changes, questions or concerns. (#[phone])   ?   Rosa Maria Winkler RN    Subjective/Objective:      Damien Mcclendon, a 60 y.o. male is on warfarin.     Damien reports:     Home warfarin dose: verbally confirmed home dose with Damien and updated on anticoagulation calendar     Missed doses: No     Medication changes:  Yes.  Reported stopped taking Celebrex.     S/S of bleeding or thromboembolism:  No     New Injury or illness:  No     Changes in diet  or alcohol consumption:  No     Upcoming surgery, procedure or cardioversion:  No    Anticoagulation Episode Summary     Current INR goal:  2.0-3.0   TTR:  75.8 % (7.3 mo)   Next INR check:  10/19/2020   INR from last check:  4.30 (10/8/2020)   Weekly max warfarin dose:     Target end date:     INR check location:     Preferred lab:     Send INR reminders to:  [facility]    Indications    History of DVT (deep vein thrombosis) [Z86.658]           Comments:           Anticoagulation Care Providers     Provider Role Specialty Phone number    Ronald Webb MD Referring Internal Medicine [phone]

## 2021-06-17 NOTE — TELEPHONE ENCOUNTER
Telephone Encounter by Rosa Maria Winkler RN at 1/14/2021  4:06 PM     Author: Rosa Maria Winkler RN Service: -- Author Type: Registered Nurse    Filed: 1/14/2021  4:16 PM Encounter Date: 1/14/2021 Status: Signed    : Rosa Maria Winkler RN (Registered Nurse)       ANTICOAGULATION  MANAGEMENT    Assessment     Today's INR result of 1.60 is Subtherapeutic (goal INR of 2.0-3.0)        Warfarin taken as previously instructed    Increased greens/vitamin K intake may be affecting INR    - diet was different during the holidays.   - but, will resume his usual intake.    No new medication/supplements affecting INR    Continues to tolerate warfarin with no reported s/s of bleeding or thromboembolism     Previous INR was Therapeutic at 2.10 on 12/10/20.    Plan:     Spoke on phone with Damien regarding INR result and instructed:     Warfarin Dosing Instructions:  (evenings. has 5mg tabs)   - today, advised one time booster with 7.5mg warfarin dose, then change warfarin dose to 5 mg daily on Mon/Thurs/Sat; and 2.5 mg daily rest of week  (11.1 % change)    Instructed patient to follow up no later than:  1-2 wks.   - INR scheduled on 2/2/21 during OV with Dr. Webb.   - patient rides the bus.    Education provided: impact of vitamin K foods on INR and target INR goal and significance of current INR result    Damien verbalizes understanding and agrees to warfarin dosing plan.    Instructed to call the [facility] for any changes, questions or concerns. (#[phone])   ?   Rosa Maria Winkler RN    Subjective/Objective:      Damien RAMIREZ Hakan, a 60 y.o. male is on warfarin.     Damien reports:     Home warfarin dose: as updated on anticoagulation calendar per template     Missed doses: No     Medication changes:  No     S/S of bleeding or thromboembolism:  No     New Injury or illness:  No     Changes in diet or alcohol consumption:  Yes:  Eating more salads during the holiday season.     Upcoming surgery, procedure or  cardioversion:  No    Anticoagulation Episode Summary     Current INR goal:  2.0-3.0   TTR:  49.4 % (7.3 mo)   Next INR check:  1/28/2021   INR from last check:  1.60 (1/14/2021)   Weekly max warfarin dose:     Target end date:     INR check location:     Preferred lab:     Send INR reminders to:  [facility]    Indications    History of DVT (deep vein thrombosis) [Z86.718]           Comments:           Anticoagulation Care Providers     Provider Role Specialty Phone number    Ronald Webb MD Referring Internal Medicine [phone]

## 2021-06-17 NOTE — TELEPHONE ENCOUNTER
Telephone Encounter by Mal Castaneda RN at 10/21/2020  3:45 PM     Author: Mal Castaneda RN Service: -- Author Type: Registered Nurse    Filed: 10/21/2020  4:25 PM Encounter Date: 10/21/2020 Status: Addendum    : Mal Castaneda RN (Registered Nurse)    Related Notes: Original Note by Mal Castaneda RN (Registered Nurse) filed at 10/21/2020  4:24 PM       ANTICOAGULATION  MANAGEMENT    Assessment     Today's INR result of 5.57 is Supratherapeutic (goal INR of 2.0-3.0)        Warfarin taken as previously instructed    No new diet changes affecting INR    No new medication/supplements affecting INR    Continues to tolerate warfarin with no reported s/s of bleeding or thromboembolism     Previous INR was Supratherapeutic    Plan:     Left detailed message for Damien regarding INR result and instructed:     Warfarin Dosing Instructions:  hold today and tomorrow then change warfarin dose to 5 mg daily on mon/fri; and 2.5 mg daily rest of week  ( 18 % change)    Instructed patient to follow up no later than: 10/30      Damien verbalizes understanding and agrees to warfarin dosing plan.    Instructed to call the [facility] for any changes, questions or concerns. (#[phone])   ?   Mal Castaneda RN    Subjective/Objective:      Damien RAMIREZ Hakan, a 60 y.o. male is on warfarin.     Damien reports:     Home warfarin dose: as updated on anticoagulation calendar per template     Missed doses: No     Medication changes:  No     S/S of bleeding or thromboembolism:  No     New Injury or illness:  No     Changes in diet or alcohol consumption:  No     Upcoming surgery, procedure or cardioversion:  No    Anticoagulation Episode Summary     Current INR goal:  2.0-3.0   TTR:  70.0 % (7.3 mo)   Next INR check:  10/30/2020   INR from last check:  5.57 (10/21/2020)   Weekly max warfarin dose:     Target end date:     INR check location:     Preferred lab:     Send INR reminders to:  [facility]     History of DVT (deep vein thrombosis) [Z86.718]           Comments:           Anticoagulation Care Providers     Provider Role Specialty Phone number    Ronald Webb MD Referring Internal Medicine [phone]

## 2021-06-17 NOTE — TELEPHONE ENCOUNTER
Telephone Encounter by Rosa Maria Winkler RN at 10/30/2020  8:20 AM     Author: Rosa Maria Winkler RN Service: -- Author Type: Registered Nurse    Filed: 10/30/2020  9:39 AM Encounter Date: 10/29/2020 Status: Signed    : Rosa Maria Winkler RN (Registered Nurse)       ANTICOAGULATION  MANAGEMENT    Assessment     Today's INR result of 4.20 is Supratherapeutic (goal INR of 2.0-3.0)        Warfarin recently held as instructed which may be affecting INR    - held warfarin dose for 2 days, 10/21-22.    Increased greens/vitamin K intake may be affecting INR    - reported eating more soups.    No new medication/supplements affecting INR    - reported, has stopped his Celebrex per Dr. Webb, due to affecting kidneys.   - complains of little more pains.    Continues to tolerate warfarin with no reported s/s of bleeding or thromboembolism     Previous INR was Supratherapeutic at 5.57 on 10/21/20.    Plan:     Spoke on phone with Damien regarding INR result and instructed:     Warfarin Dosing Instructions:    - reported holding warfarin dose last night.   - Change warfarin dose to 5 mg daily on Mondays; and 2.5 mg daily rest of week.   - (11.1 % change)    Instructed patient to follow up no later than: 7-10 days.   - INR scheduled on 11/5/20 @ [facility].    Education provided: importance of consistent vitamin K intake, target INR goal and significance of current INR result, importance of notifying clinic for changes in medications, monitoring for bleeding signs and symptoms and when to seek medical attention/emergency care    Damien verbalizes understanding and agrees to warfarin dosing plan.    Instructed to call the [facility] for any changes, questions or concerns. (#[phone])   ?   Rosa Maria Winkler RN    Subjective/Objective:      Damien RAMIREZ Hakan, a 60 y.o. male is on warfarin.     Damien reports:     Home warfarin dose: as updated on anticoagulation calendar per template     Missed doses: No.  Held warfarin  dose 2 days as instructed.     Medication changes:  No.  Has stopped Celebrex, a while ago.     S/S of bleeding or thromboembolism:  No     New Injury or illness:  No     Changes in diet or alcohol consumption:  Yes:  Reported eating more salads.     Upcoming surgery, procedure or cardioversion:  No    Anticoagulation Episode Summary     Current INR goal:  2.0-3.0   TTR:  66.2 % (7.3 mo)   Next INR check:  11/6/2020   INR from last check:  4.20 (10/29/2020)   Weekly max warfarin dose:     Target end date:     INR check location:     Preferred lab:     Send INR reminders to:  [facility]    Indications    History of DVT (deep vein thrombosis) [Z86.718]           Comments:           Anticoagulation Care Providers     Provider Role Specialty Phone number    Ronald Webb MD Referring Internal Medicine [phone]

## 2021-06-19 NOTE — LETTER
Letter by Rosa Maria Winkler RN at      Author: Rosa Maria Winkler RN Service: -- Author Type: --    Filed:  Encounter Date: 3/25/2019 Status: (Other)         Damien Mcclendon  [address]  [place] [address]      April 12, 2019      Dear Mr. Mcclendon,    You are currently under the care of [facility] for your warfarin (Coumadin ) therapy. Our records show that your last INR was on 2/15/2019 and you were due to come back on 3/15/2019. We have attempted to reach you by phone to schedule an INR appointment, but have not heard back from you.    The correct dose of warfarin for you may change from time to time based on your INR result. We would like to ensure that your warfarin dose is correct and that you are not having any side effects. It is not safe for you to be on Warfarin if your INR is not checked regularly. If your INR is too high, serious bleeding can occur. If your INR is too low, blood clots can form and lead to heart attack, stroke or a blood clot in the lung.      Getting your INR checked as instructed is required in order for the [facility] to continue managing and refilling your warfarin. We realize that it might be difficult for you to have frequent blood testing, but it is for your own safety.    Please contact us at [phone] as soon as possible to schedule an INR appointment. Our clinic staff is available Monday through Friday 8:00 am to 5:00 pm.  If you have been told to stop taking warfarin or your warfarin is being managed by another healthcare provider, please call and inform our staff.       Sincerely,     [facility]

## 2021-06-19 NOTE — LETTER
Letter by Andres Warren CHW at      Author: Andres Warren CHW Service: -- Author Type: --    Filed:  Encounter Date: 11/13/2019 Status: Signed         Dear Damien,                                                                             You were recently referred to the [facility]'s Clinic Care Coordination service.  This is a service offered through your Primary Care Clinic which can help you access resources, services in regard to your health and well-being goals. The clinic Community Health Worker has placed two calls to you to discuss the nature of this service and to offer enrollment.  If you are interested in learning more about Clinic Care Coordination, please call your Primary Care Clinic's Community Health Worker, Sara, at [phone].                                                      Sincerely,                                                                              JAMIE Ruiz                                                                                          Clinic Care Coordination                                                  [facility]

## 2021-06-19 NOTE — LETTER
Letter by Rosa Maria Winkler RN at      Author: Rosa Maria Winkler RN Service: -- Author Type: --    Filed:  Encounter Date: 8/26/2019 Status: (Other)         Damien Mcclendon  [address]  [place] [address]      September 19, 2019      Dear Mr. Mcclendon,    You are currently under the care of [facility] for your warfarin (Coumadin ) therapy. Our records show that your last INR was on 8/13/2019 and you were due to come back on 8/23/2019. We have attempted to reach you by phone to schedule an INR appointment, but have not heard back from you.    The correct dose of warfarin for you may change from time to time based on your INR result. We would like to ensure that your warfarin dose is correct and that you are not having any side effects. It is not safe for you to be on Warfarin if your INR is not checked regularly. If your INR is too high, serious bleeding can occur. If your INR is too low, blood clots can form and lead to heart attack, stroke or a blood clot in the lung.      Getting your INR checked as instructed is required in order for the [facility] to continue managing and refilling your warfarin. We realize that it might be difficult for you to have frequent blood testing, but it is for your own safety.    Please contact us at [phone] as soon as possible to schedule an INR appointment. Our clinic staff is available Monday through Friday 8:00 am to 5:00 pm.  If you have been told to stop taking warfarin or your warfarin is being managed by another healthcare provider, please call and inform our staff.       Sincerely,     [facility]

## 2021-06-19 NOTE — LETTER
Letter by Rosa Maria Winkler RN at      Author: Rosa Maria Winkler RN Service: -- Author Type: --    Filed:  Encounter Date: 8/26/2019 Status: Signed         Damien Mcclendon  [address]  [place] [address]      October 11, 2019      Dear Mr. Mcclendon,    You are currently under the care of [facility] for your warfarin therapy.  We have attempted to contact you several times regarding your warfarin therapy but have not received a return call from you.  Our records show that your last INR was on 8/13/2019 and you were due to come back on: 8/23/2019.     The correct dose of warfarin for you may change from time to time based on your INR result. We would like to ensure that your warfarin dose is correct and that you are not having any side effects. It is not safe for you to be on Warfarin if your INR is not checked regularly. If your INR is too high, serious bleeding can occur. If your INR is too low, blood clots can form and lead to heart attack, stroke or a blood clot in the lung.  Getting your INR checked as instructed is required in order for the [facility] to continue managing and refilling your warfarin.    If you have been told to stop taking warfarin or your warfarin is being managed by another healthcare provider, please call and inform our staff so we can update your records.  If this is not the case, please contact us at [phone] to schedule an INR appointment as soon as possible. Our clinic staff is available Monday through Friday 8:00 am to 5:00 pm.     If we do not hear from you, it may lead to being discharged from the Anticoagulation Management Program.  If this occurs, your [facility] primary care provider would then take over managing your warfarin and you would need to have regular office visits with your provider for warfarin management.    Sincerely,       [facility]

## 2021-06-20 NOTE — LETTER
Letter by Ronald Webb MD at      Author: Ronald Webb MD Service: -- Author Type: --    Filed:  Encounter Date: 8/5/2020 Status: (Other)         Damien Mcclendon  [address]  [place] [address]             August 5, 2020         Dear Mr. Mcclendon,    Below are the results from your recent visit:    Resulted Orders   Comprehensive Metabolic Panel   Result Value Ref Range    Sodium 136 136 - 145 mmol/L    Potassium 4.9 3.5 - 5.0 mmol/L    Chloride 101 98 - 107 mmol/L    CO2 26 22 - 31 mmol/L    Anion Gap, Calculation 9 5 - 18 mmol/L    Glucose 89 70 - 125 mg/dL    BUN 24 (H) 8 - 22 mg/dL    Creatinine 1.53 (H) 0.70 - 1.30 mg/dL    GFR MDRD Af Amer 57 (L) >60 mL/min/1.73m2    GFR MDRD Non Af Amer 47 (L) >60 mL/min/1.73m2    Bilirubin, Total 0.5 0.0 - 1.0 mg/dL    Calcium 8.8 8.5 - 10.5 mg/dL    Protein, Total 7.2 6.0 - 8.0 g/dL    Albumin 3.8 3.5 - 5.0 g/dL    Alkaline Phosphatase 51 45 - 120 U/L    AST 27 0 - 40 U/L    ALT 19 0 - 45 U/L    Narrative    Fasting Glucose reference range is 70-99 mg/dL per  American Diabetes Association (ADA) guidelines.   Lipid Cascade   Result Value Ref Range    Cholesterol 167 <=199 mg/dL    Triglycerides 68 <=149 mg/dL    HDL Cholesterol 61 >=40 mg/dL    LDL Calculated 92 <=129 mg/dL    Patient Fasting > 8hrs? Yes        Damien, your kidney function has declined considerably since we checked it last.  Have you been taking any over-the-counter anti-inflammatory medications such as Advil or Aleve?  Have you been staying hydrated?  I need you to stop the Celebrex at this time and I need to recheck your kidney function in a couple weeks.     Please call with questions or contact us using [facility].    Sincerely,        Electronically signed by Ronald Webb MD

## 2021-06-20 NOTE — LETTER
Letter by Ronald Webb MD at      Author: Ronald Webb MD Service: -- Author Type: --    Filed:  Encounter Date: 1/16/2020 Status: Signed         Damien Mcclendon  [address]  [place] [address]             January 16, 2020         Dear Mr. Mcclendon,    Below are the results from your recent visit:    Resulted Orders   US Abdomen Limited    Narrative    EXAM: US ABDOMEN LIMITED  LOCATION: [facility]  DATE/TIME: 1/15/2020 3:42 PM    INDICATION: Thrombocytopenia.  COMPARISON: None.  TECHNIQUE: Limited abdominal ultrasound.    FINDINGS:    GALLBLADDER: Normal. No gallstones, wall thickening, or pericholecystic fluid. Negative sonographic Cloud's sign.    BILE DUCTS: No biliary dilatation. The common duct measures 2 mm.    LIVER: Echogenic parenchyma. No focal mass.    RIGHT KIDNEY: No hydronephrosis.    PANCREAS: The visualized portions are normal.    SPLEEN: Normal measuring 9.4 cm.    No ascites.      Impression    1.  Hepatic steatosis.  2.  Normal spleen.       Damien, you have fat in your liver.  Alcohol cessation and a lower carbohydrate diet should help with that.  The remainder of the ultrasound is normal.  Good news.     Please call with questions or contact us using [employer].    Sincerely,        Electronically signed by Ronald Webb MD

## 2021-06-20 NOTE — LETTER
Letter by Ronald Webb MD at      Author: Ronald Webb MD Service: -- Author Type: --    Filed:  Encounter Date: 1/9/2020 Status: Signed         Damien Mcclendon  [address]  [place] [address]             January 9, 2020         Dear Mr. Mcclendon,    Below are the results from your recent visit:    Resulted Orders   Glycosylated Hemoglobin A1c   Result Value Ref Range    Hemoglobin A1c 5.9 3.5 - 6.0 %   Comprehensive Metabolic Panel   Result Value Ref Range    Sodium 136 136 - 145 mmol/L    Potassium 5.6 (H) 3.5 - 5.0 mmol/L    Chloride 100 98 - 107 mmol/L    CO2 26 22 - 31 mmol/L    Anion Gap, Calculation 10 5 - 18 mmol/L    Glucose 115 70 - 125 mg/dL    BUN 16 8 - 22 mg/dL    Creatinine 0.84 0.70 - 1.30 mg/dL    GFR MDRD Af Amer >60 >60 mL/min/1.73m2    GFR MDRD Non Af Amer >60 >60 mL/min/1.73m2    Bilirubin, Total 1.4 (H) 0.0 - 1.0 mg/dL    Calcium 9.9 8.5 - 10.5 mg/dL    Protein, Total 7.8 6.0 - 8.0 g/dL    Albumin 3.9 3.5 - 5.0 g/dL    Alkaline Phosphatase 61 45 - 120 U/L    AST 24 0 - 40 U/L    ALT 16 0 - 45 U/L    Narrative    Fasting Glucose reference range is 70-99 mg/dL per  American Diabetes Association (ADA) guidelines.   Urinalysis-UC if Indicated   Result Value Ref Range    Color, UA Yellow Colorless, Yellow, Straw, Light Yellow    Clarity, UA Clear Clear    Glucose, UA Negative Negative    Bilirubin, UA Small (!) Negative    Ketones, UA Trace (!) Negative    Specific Gravity, UA 1.025 1.005 - 1.030    Blood, UA Negative Negative    pH, UA 5.5 5.0 - 8.0    Protein, UA Negative Negative mg/dL    Urobilinogen, UA 0.2 E.U./dL 0.2 E.U./dL, 1.0 E.U./dL    Nitrite, UA Negative Negative    Leukocytes, UA Negative Negative    Narrative    Microscopic not indicated  UC not indicated   Lipid Cascade   Result Value Ref Range    Cholesterol 187 <=199 mg/dL    Triglycerides 64 <=149 mg/dL    HDL Cholesterol 67 >=40 mg/dL    LDL Calculated 107 <=129 mg/dL    Patient Fasting > 8hrs? Yes    HM2(CBC w/o  Differential)   Result Value Ref Range    WBC 6.1 4.0 - 11.0 thou/uL    RBC 4.45 4.40 - 6.20 mill/uL    Hemoglobin 15.3 14.0 - 18.0 g/dL    Hematocrit 45.2 40.0 - 54.0 %     (H) 80 - 100 fL    MCH 34.5 (H) 27.0 - 34.0 pg    MCHC 33.9 32.0 - 36.0 g/dL    RDW 12.4 11.0 - 14.5 %    Platelets 98 (L) 140 - 440 thou/uL    MPV 10.1 (H) 7.0 - 10.0 fL       Potassium was a little high again.  I am hoping this was just another fluke since it was the end of the day.  Please watch the potassium in the diet and I will recheck this again at your next visit.  Cholesterol was high.  Please make sure you are taking your medications on a daily basis.    Red blood cell count is normal as is white blood cell count, but your platelet count is quite low again.  It was like that four years ago.  I will do some additional lab work when you are in again, and I would like to do an ultrasound of your liver and spleen in the meantime as well.  Average sugar looks stable.  Urine looks okay.     Please call with questions or contact us using [facility].    Sincerely,        Electronically signed by Ronald Webb MD

## 2021-06-20 NOTE — LETTER
Letter by Sara Redding CHW at      Author: Sara Redding CHW Service: -- Author Type: --    Filed:  Encounter Date: 9/9/2020 Status: (Other)         September 10, 2020            Damien Mcclendon  [address]  [place] [address]        Dear Damien,                                                                                                                               You enrolled with the [facility] Care Coordination Services.  In order for us to provide guidance we need to connect on a monthly bases.  We have tried calling you 2 times in the last month and have been unsuccessful in reaching you. Please call me at [phone] at your earliest convenience.  If you reach my voicemail, please leave a message with your daytime telephone number and a date and time that I can return your call.                                                                            Sincerely,        JAMIE Ruiz                                                                     Clinic Care Coordination                                          [facility]

## 2021-06-20 NOTE — LETTER
Letter by Jasmin Acevedo at      Author: Jasmin Acevedo Service: -- Author Type: --    Filed:  Encounter Date: 9/8/2020 Status: (Other)         [facility] Care Coordination     [address]  [place] [address]      Dear Damien,                                                                          The Clinic Financial Worker has attempted to reach you by phone regarding health insurance.     If you still need assistance, please call Mary at [phone].         Sincerely,                                                                         Mary OTERO                                                          Clinic Care Coordination                                         [facility]

## 2021-06-20 NOTE — LETTER
Letter by Ronald Webb MD at      Author: Ronald Webb MD Service: -- Author Type: --    Filed:  Encounter Date: 2/20/2020 Status: (Other)         Damien Mcclendon  [address]  [place] [address]             February 20, 2020         Dear Mr. Mcclendon,    Below are the results from your recent visit:    Resulted Orders   Vitamin B12   Result Value Ref Range    Vitamin B-12 393 213 - 816 pg/mL   Folate, Serum   Result Value Ref Range    Folate 6.3 >=3.5 ng/mL       Vitamin B12 is on the lower end of normal.  I would recommend 100 mcg supplement daily.  Folic acid looks fine.     Please call with questions or contact us using [employer].    Sincerely,        Electronically signed by Ronald Webb MD

## 2021-06-20 NOTE — LETTER
Letter by Ronald Webb MD at      Author: Ronald Webb MD Service: -- Author Type: --    Filed:  Encounter Date: 2/19/2020 Status: (Other)         Damien Mcclendon  [address]  [place] [address]             February 19, 2020         Dear Mr. Mcclendon,    Below are the results from your recent visit:    Resulted Orders   Basic Metabolic Panel   Result Value Ref Range    Sodium 136 136 - 145 mmol/L    Potassium 4.7 3.5 - 5.0 mmol/L    Chloride 100 98 - 107 mmol/L    CO2 26 22 - 31 mmol/L    Anion Gap, Calculation 10 5 - 18 mmol/L    Glucose 93 70 - 125 mg/dL    Calcium 9.6 8.5 - 10.5 mg/dL    BUN 20 8 - 22 mg/dL    Creatinine 0.82 0.70 - 1.30 mg/dL    GFR MDRD Af Amer >60 >60 mL/min/1.73m2    GFR MDRD Non Af Amer >60 >60 mL/min/1.73m2    Narrative    Fasting Glucose reference range is 70-99 mg/dL per  American Diabetes Association (ADA) guidelines.   Morphology, Path Smear Review (MORP)   Result Value Ref Range    Pathology, Smear Review See Separate Pathology Report (!) (none)    WBC 4.8 4.0 - 11.0 thou/uL    RBC 4.33 (L) 4.40 - 6.20 mill/uL    Hemoglobin 15.1 14.0 - 18.0 g/dL    Hematocrit 44.8 40.0 - 54.0 %     (H) 80 - 100 fL    MCH 34.9 (H) 27.0 - 34.0 pg    MCHC 33.7 32.0 - 36.0 g/dL    RDW 14.6 (H) 11.0 - 14.5 %    Platelets 120 (L) 140 - 440 thou/uL    MPV 11.7 8.5 - 12.5 fL    Neutrophils % 64 50 - 70 %    Lymphocytes % 22 20 - 40 %    Monocytes % 10 2 - 10 %    Eosinophils % 2 0 - 6 %    Basophils % 1 0 - 2 %    Neutrophils Absolute 3.1 2.0 - 7.7 thou/uL    Lymphocytes Absolute 1.1 0.8 - 4.4 thou/uL    Monocytes Absolute 0.5 0.0 - 0.9 thou/uL    Eosinophils Absolute 0.1 0.0 - 0.4 thou/uL    Basophils Absolute 0.1 0.0 - 0.2 thou/uL   HM1 (CBC with Diff)   Result Value Ref Range    WBC 4.9 4.0 - 11.0 thou/uL    RBC 4.31 (L) 4.40 - 6.20 mill/uL    Hemoglobin 15.1 14.0 - 18.0 g/dL    Hematocrit 44.6 40.0 - 54.0 %     (H) 80 - 100 fL    MCH 35.0 (H) 27.0 - 34.0 pg    MCHC 33.9 32.0 -  36.0 g/dL    RDW 14.7 (H) 11.0 - 14.5 %    Platelets 112 (L) 140 - 440 thou/uL    MPV 11.9 8.5 - 12.5 fL    Neutrophils % 64 50 - 70 %    Lymphocytes % 22 20 - 40 %    Monocytes % 10 2 - 10 %    Eosinophils % 2 0 - 6 %    Basophils % 1 0 - 2 %    Neutrophils Absolute 3.1 2.0 - 7.7 thou/uL    Lymphocytes Absolute 1.1 0.8 - 4.4 thou/uL    Monocytes Absolute 0.5 0.0 - 0.9 thou/uL    Eosinophils Absolute 0.1 0.0 - 0.4 thou/uL    Basophils Absolute 0.1 0.0 - 0.2 thou/uL   Peripheral Blood Smear, Path Review   Result Value Ref Range    Case Report       Peripheral Blood Morphology Report                Case: [id number]                                   Authorizing Provider:  Ronald Webb MD         Collected:           02/12/2020 1526              Ordering Location:     [facility]   Received:            02/13/2020 0716                                     Internal Medicine                                                            Pathologist:           Akash Metz MD                                                        Specimen:    Peripheral Blood                                                                           Final Diagnosis       PERIPHERAL BLOOD:    -  MACROCYTIC ANEMIA     -  THROMBOCYTOPENIA     -  OCCASIONAL REACTIVE-APPEARING LYMPHOCYTES ARE PRESENT    -  NEGATIVE FOR ACUTE LEUKEMIA    Comment       Macrocytosis can be seen in liver disease, hypothyroidism, refractory anemias, vitamin B12 and folate deficiency, and drug/alcohol use, among other etiologies.     Thrombocytopenia is a nonspecific finding and may be due to multiple etiologies that cause decreased platelet production, increased platelet destruction by immune and by non-immune processes, and abnormal platelet pooling. Please correlate with clinical findings.    Clinical Information D75.89     Charges CPT:  50549    ICD-10:  D53.9, D69.6        Your blood looks like you could be deficient in vitamin B12 or folic acid.   I ordered those tests but they were not completed for some reason.  We will need to make sure that we get those done.  If you are not deficient in these vitamins, I will need you to meet with a blood specialist to determine why you have these changes to your blood cells.     Please call with questions or contact us using [employer].    Sincerely,        Electronically signed by Ronald Webb MD

## 2021-06-20 NOTE — LETTER
Letter by Jasmin Acevedo at      Author: Jasmin Acevedo Service: -- Author Type: --    Filed:  Encounter Date: 8/13/2020 Status: (Other)           August 13, 2020          Damien Mcclendon  [address]  [place] [address]          Dear Damien,      Included in this envelope is your application for health insurance. Please fill in your social security numbers and sign the application. Return to [facility] at [address] [place] [address]. They also have a drop box at the [facility].     Also included is the financial aid application for your medical bills. Please sign and return with the verification requested (2 most recent paystubs, copy of 2019 1040 tax return, recent bank statement). This will go to [facility] at [address] [place] [address].     If you need further assistance with these forms please call me at [phone].  If you reach my voicemail, please leave a message with your daytime telephone number and a date and time that I can call you.        Sincerely,    Mary OTERO   Clinic Financial Worker  Clinic Care Coordination

## 2021-06-20 NOTE — LETTER
Letter by Myhre, David J, RN at      Author: Myhre, David J, RN Service: -- Author Type: --    Filed:  Encounter Date: 8/10/2020 Status: (Other)       CARE COORDINATION  [facility]  [address] [place] [address]      August 15, 2020    Damien Mcclendon  [address]  [place] [address]      Dear Damien,    I am a clinic care coordinator  who works with Ronald Webb MD at the [facility]. I wanted to thank you for spending the time to talk with me.  Below is a description of clinic care coordination and how I can further assist you.      The clinic care coordination team is made up of a registered nurse,  and community health worker who understand the health care system. The goal of clinic care coordination is to help you manage your health and improve access to the health care system in the most efficient manner. The team can assist you in meeting your health care goals by providing education, coordinating services, strengthening the communication among your providers and supporting you with any resource needs.    Please feel free to contact the Community Health WorkerSara at [phone] with any questions or concerns. We are focused on providing you with the highest-quality healthcare experience possible and that all starts with you.     Sincerely,     David Myhre, Rn  CCC RN    Enclosed: I have enclosed a copy of the Care Plan. This has helpful information and goals that we have talked about. Please keep this in an easy to access place to use as needed.

## 2021-06-20 NOTE — LETTER
Letter by Ronald Webb MD at      Author: Ronald Webb MD Service: -- Author Type: --    Filed:  Encounter Date: 1/16/2020 Status: Signed         Damien Mcclendon  [address]  [place] [address]             January 16, 2020         Dear German Hakan,    Below are the results from your recent visit:    Resulted Orders   MR Brain Without Contrast    Narrative    EXAM: MR BRAIN WO CONTRAST  LOCATION: [facility]  DATE/TIME: 1/15/2020 4:04 PM    INDICATION: Ataxia, stroke suspected; memory loss  COMPARISON: None.  TECHNIQUE: Routine multiplanar multisequence head MRI without intravenous contrast.    FINDINGS:  INTRACRANIAL CONTENTS: No evidence for acute or subacute infarction based on diffusion-weighted imaging. No mass, acute hemorrhage, or extra-axial fluid collections. Scattered nonspecific foci of T2/FLAIR hyperintense signal in the cerebral white matter.   Mild generalized cerebral atrophy. No hydrocephalus. No significant asymmetric volume loss affecting either hippocampus. Dedicated high-resolution axial T2 acquisition through the internal auditory canals and cerebellopontine angles demonstrates normal,   symmetric appearance, without mass or asymmetry.  Normal position of the cerebellar tonsils.     SELLA: No abnormality accounting for technique.    OSSEOUS STRUCTURES/SOFT TISSUES: Normal marrow signal. The major intracranial vascular flow voids are maintained.     ORBITS: Prior right cataract surgery. Visualized portions of the orbits are otherwise unremarkable.     SINUSES/MASTOIDS: Mild mucosal thickening scattered about the paranasal sinuses, greatest in the left maxillary sinus. Trace bilateral mastoid effusions.        Impression    1.  No acute/subacute infarction, intracranial hemorrhage, mass effect, or hydrocephalus.  2.  Mild global brain parenchymal volume loss with presumed sequelae of very mild chronic small vessel ischemic disease.  3.  Mild paranasal sinus disease with  trace bilateral mastoid effusions.  4.  Normal appearance of the bilateral cerebellopontine angles and internal auditory canals.       No worrisome findings on your brain MRI.  You do have some age-related changes and changes due to your smoking.  I recommend discontinuing the cigarettes now.     Please call with questions or contact us using [facility].    Sincerely,        Electronically signed by Ronald Webb MD

## 2021-06-20 NOTE — LETTER
Letter by Sara Redding CHW at      Author: Sara Redding CHW Service: -- Author Type: --    Filed:  Encounter Date: 10/12/2020 Status: (Other)       CARE COORDINATION  [facility]  [address]  [place] [address]    October 12, 2020    Damien Mcclendon  [address]  [place] [address]      Dear Damien,  I have been unsuccessful in reaching you since our last contact. At this time the Care Coordination team will make no further attempts to reach you, however this does not change your ability to continue receiving care from your providers at your primary care clinic. If you need additional support from a care coordinator in the future please contact Sara at [phone].    All of us at the [facility] are invested in your health and are here to assist you in meeting your goals.     Sincerely,    Sara, Community Health Worker

## 2021-06-20 NOTE — LETTER
Letter by Myhre, David J, RN at      Author: Myhre, David J, RN Service: -- Author Type: --    Filed:  Encounter Date: 8/10/2020 Status: (Other)       Care Plan  About Me:    Patient Name:  Damien Mcclendon    YOB: 1960  Age:         60 y.o.   [facility] MRN:    [id number] Telephone Information:  Home Phone [phone]   Mobile Not on file.       Address:  [address]  [place] [address] Email address:  No e-mail address on record      Emergency Contact(s)  Extended Emergency Contact Information      Name: Shawna Mcclendon  Address:       [address]      [place] [address]  Home Phone Number: [phone]  Relation: Spouse      [place] [address]  Relation: Declined          Primary language:  English     needed? [employer]:  [phone] op. 1  Other communication barriers: None  Preferred Method of Communication:     Current living arrangement: I live in a private home with family  Mobility Status/ Medical Equipment: Independent    Health Maintenance  Health Maintenance Reviewed: Up to date    My Access Plan  Medical Emergency 911   Primary Clinic Line Ronald Webb MD - [phone]   24 Hour Appointment Line [phone] or  [phone]) (toll-free)   24 Hour Nurse Line [phone] (toll-free)   Preferred Urgent Care [facility] - [facility], [phone]   Preferred Hospital [facility]  [phone]   Preferred Pharmacy [id number] - [place], [place] - [address]      Behavioral Health Crisis Line The National Suicide Prevention Lifeline at 1-534.367.3848 or 911             My Care Team Members  Patient Care Team       Relationship Specialty Notifications Start End    Ronald Webb MD PCP - General   4/4/08     Phone: [phone] Fax: [phone]         [address] [place] [address]    Ronald Webb MD Assigned PCP   7/28/19     Phone:  [phone] Fax: [phone]         [address] [place] [address]    Jasmin Acevedo Financial Resource Worker Primary Care -   8/11/20     Phone: [phone] Fax: [phone]        Myhre, David J, RN Lead Care Coordinator Primary Care -  Admissions 8/15/20     Fax: [phone]         Sara Redding CHW Community Health Worker Primary Care -  Admissions 8/15/20     Phone: [phone] Fax: [phone]                My Care Plans  Self Management and Treatment Plan  Goals and (Comments)  Goals        General    Financial Wellbeing (pt-stated)     Notes - Note edited  8/15/2020  3:37 PM by Myhre, David J, RN    Goal Statement: I would like to have medical insurance to help cover the cost of my medications and medical appointments in the next 3 months.   Date Goal set: 8/10/20  Barriers: Patient currently does not have medical insurance.   Strengths: Strong advocate for himself.   Date to Achieve By: 11/10/20  Patient expressed understanding of goal: Yes  Action steps to achieve this goal:  1. I will understand the [facility] RNJacinto will request a referral to the [facility] Financial Resources Guide, Mary to discuss possible medical insurance programs that may be available to me.   2. I understand Mary will contact me directly.   3. I will provide any necessary documentation and complete as paperwork that may be associated with this goal in a timely manner.   4. I will update the [facility] Community Health WorkerSara at scheduled outreach phone calls with my progress towards this goal.         Financial Wellbeing (pt-stated)     Notes - Note created  8/15/2020  3:37 PM by Myhre, David J, RN    Goal Statement: I would like to know if I am eligible for assistance with [facility] medical bills in the next 3 months.   Date Goal set: 8/10/20  Barriers: Patient does not have medical insurance.   Strengths: Patient is a strong advocate for himself.   Date to Achieve By:  11/30/20  Patient expressed understanding of goal: Yes  Action steps to achieve this goal:  1. I understand the [facility] RNJacinto will request a referral for the [facility] Financial Resource Worker, Mary to discuss eligibility for programs that may assist with past medical bills.   2. I understand Mary will call me directed.   3. I will provide any necessary documentation an complete any paperwork that may be associated with this goal in a timely manner.         Medical (pt-stated)     Notes - Note edited  8/15/2020  4:24 PM by Myhre, David J, RN    Goal Statement: I would like to get my INR within therapeutic range consistently in the next three months.   Date Goal set: 8/10/20  Barriers: INR is not always within therapeutic range.   Strengths: Patient has a strong willingness to do what he has to do better his health.  Date to Achieve By: 11/10/20  Patient expressed understanding of goal: Yes  Action steps to achieve this goal:  1. I will continue to take my warfarin daily as scheduled.  2. I will continue to attend all INR lab draws.  3. I will continue to work with the [facility] Anticoagulation RN regarding my warfarin dosing and adhere to diet recommendations made by her.   4. I will continue to attend all follow-up appointments with my PCP.  5. I will report progress towards this goal at scheduled outreach calls from the [facility] team.           Transportation (pt-stated)     Notes - Note created  8/15/2020  3:08 PM by Myhre, David J, RN    Goal Statement: Goal Statement- I would like to apply for [employer] within the next 6 months.  Date Goal set: 8/10/20  Barriers: Patient has transportation needs  Strengths: Patient is a strong advocate for himself.   Date to Achieve By: 2/10/21  Patient expressed understanding of goal: Yes  Action steps to achieve this goal  1.  The Community Health Worker will mail me the application and information for [employer].  2.  I will review the application and complete the  Certification Questionnaire section and send or give to my PCP. If I need assistance with this, I will request an appointment.  3.  The Doctor will complete the Professional Verification Form section of the application and the Community Health Worker will coordinate mailing both sections in.  4. I will notify the Community Health Worker once I find out if I am approved or denied [employer].                    Advance Care Plans/Directives Type:        My Medical and Care Information  Problem List   Patient Active Problem List   Diagnosis   ? Tobacco abuse   ? Avascular Necrosis   ? Mixed hyperlipidemia   ? Chronic Cutaneous Ulcer Venous Stasis   ? Pulmonary embolism (H)   ? Atrial Fibrillation   ? Male Erectile Disorder   ? Memory Lapses Or Loss   ? Abnormal Weight Loss   ? History of DVT (deep vein thrombosis)   ? Hip fracture (H)   ? Macrocytosis   ? Prediabetes   ? Essential hypertension, benign   ? Age-related osteoporosis with current pathological fracture with routine healing   ? Thrombocytopenia (H)      Current Medications and Allergies:  See printed Medication Report.    Care Coordination Start Date: 8/10/2020   Frequency of Care Coordination:     Form Last Updated: 08/15/2020

## 2021-06-20 NOTE — LETTER
Letter by Rosa Maria Winkler RN at      Author: Rosa Maria Winkler RN Service: -- Author Type: --    Filed:  Encounter Date: 5/12/2020 Status: (Other)         Damien Mcclendon  [address]  [place] [address]      June 9, 2020      Dear Mr. Mcclendon,    We are contacting you because our records show you were due for an INR on 5/5.?   ?  We understand that this is a difficult time. In response to the COVID-19 [facility] has made several changes to help keep you safe. Some examples include limiting in person office visits to essential appointments only, wearing masks, and calling patients the day before appointments to screen for symptoms of illness.?   ?  INR testing is considered essential care during this time. There are potentially serious risks when taking warfarin without careful monitoring, and we want to make sure you stay safe.  ?    Please call the INR clinic at [phone] to discuss your warfarin care, and to set up an appointment. If there has been a change in your medications or provider, please let us know so we can update our records.  ?  Sincerely,  ?  [facility]

## 2021-06-20 NOTE — LETTER
Letter by Ronald Webb MD at      Author: Ronald Webb MD Service: -- Author Type: --    Filed:  Encounter Date: 8/20/2020 Status: (Other)         Damien Mcclendon  [address]  [place] [address]             August 20, 2020         Dear Mr. Mcclendon,    Below are the results from your recent visit:    Resulted Orders   Basic Metabolic Panel   Result Value Ref Range    Sodium 137 136 - 145 mmol/L    Potassium 4.0 3.5 - 5.0 mmol/L    Chloride 100 98 - 107 mmol/L    CO2 26 22 - 31 mmol/L    Anion Gap, Calculation 11 5 - 18 mmol/L    Glucose 179 (H) 70 - 125 mg/dL    Calcium 9.3 8.5 - 10.5 mg/dL    BUN 12 8 - 22 mg/dL    Creatinine 1.00 0.70 - 1.30 mg/dL    GFR MDRD Af Amer >60 >60 mL/min/1.73m2    GFR MDRD Non Af Amer >60 >60 mL/min/1.73m2    Narrative    Fasting Glucose reference range is 70-99 mg/dL per  American Diabetes Association (ADA) guidelines.        Good news.  Kidney function is back to baseline/normal.  Stay off the Celebrex.  If pain becomes an issue we will  get you set up to see orthopedics.     Please call with questions or contact us using [employer].    Sincerely,        Electronically signed by Ronald Webb MD

## 2021-06-21 NOTE — LETTER
Letter by Ronald Webb MD at      Author: Ronald Webb MD Service: -- Author Type: --    Filed:  Encounter Date: 2/19/2021 Status: (Other)         Damien Mcclendon  [address]  [place] [address]             February 19, 2021         Dear Mr. Mcclendon,    Below are the results from your recent visit:    Resulted Orders   Comprehensive Metabolic Panel   Result Value Ref Range    Sodium 134 (L) 136 - 145 mmol/L    Potassium 5.3 (H) 3.5 - 5.0 mmol/L    Chloride 100 98 - 107 mmol/L    CO2 23 22 - 31 mmol/L    Anion Gap, Calculation 11 5 - 18 mmol/L    Glucose 134 (H) 70 - 125 mg/dL    BUN 31 (H) 8 - 22 mg/dL    Creatinine 1.12 0.70 - 1.30 mg/dL    GFR MDRD Af Amer >60 >60 mL/min/1.73m2    GFR MDRD Non Af Amer >60 >60 mL/min/1.73m2    Bilirubin, Total 0.7 0.0 - 1.0 mg/dL    Calcium 10.0 8.5 - 10.5 mg/dL    Protein, Total 8.1 (H) 6.0 - 8.0 g/dL    Albumin 4.3 3.5 - 5.0 g/dL    Alkaline Phosphatase 61 45 - 120 U/L    AST 22 0 - 40 U/L    ALT 20 0 - 45 U/L    Narrative    Fasting Glucose reference range is 70-99 mg/dL per  American Diabetes Association (ADA) guidelines.   Urinalysis-UC if Indicated   Result Value Ref Range    Color, UA Yellow Colorless, Yellow, Straw, Light Yellow    Clarity, UA Clear Clear    Glucose, UA Negative Negative    Bilirubin, UA Small (!) Negative    Ketones, UA Trace (!) Negative    Specific Gravity, UA 1.025 1.005 - 1.030    Blood, UA Negative Negative    pH, UA 5.5 5.0 - 8.0    Protein, UA Negative Negative mg/dL    Urobilinogen, UA 0.2 E.U./dL 0.2 E.U./dL, 1.0 E.U./dL    Nitrite, UA Negative Negative    Leukocytes, UA Negative Negative    Narrative    Microscopic not indicated  UC not indicated   Hepatitis C Antibody (Anti-HCV)   Result Value Ref Range    Hepatitis C Ab Negative Negative   Lipid Cascade FASTING   Result Value Ref Range    Cholesterol 155 <=199 mg/dL    Triglycerides 64 <=149 mg/dL    HDL Cholesterol 57 >=40 mg/dL    LDL Calculated 85 <=129 mg/dL    Patient Fasting  > 8hrs? Yes    Glycosylated Hemoglobin A1c   Result Value Ref Range    Hemoglobin A1c 5.6 <=5.6 %   HM2(CBC w/o Differential)   Result Value Ref Range    WBC 7.1 4.0 - 11.0 thou/uL    RBC 4.63 4.40 - 6.20 mill/uL    Hemoglobin 15.6 14.0 - 18.0 g/dL    Hematocrit 46.5 40.0 - 54.0 %     80 - 100 fL    MCH 33.7 27.0 - 34.0 pg    MCHC 33.5 32.0 - 36.0 g/dL    RDW 12.4 11.0 - 14.5 %    Platelets 139 (L) 140 - 440 thou/uL    MPV 11.4 (H) 7.0 - 10.0 fL   PSA, Annual Screen (Prostatic-Specific Antigen)   Result Value Ref Range    PSA 1.1 0.0 - 4.5 ng/mL    Narrative    Method is Abbott Prostate-Specific Antigen (PSA)  Standard-WHO 1st International (90:10)       Labs look good/stable.     Please call with questions or contact us using [employer].    Sincerely,        Electronically signed by Ronald Webb MD

## 2021-06-21 NOTE — PROGRESS NOTES
Date of Service:11/21/2018    Chief Complaint:   Chief Complaint   Patient presents with     Consult       History:    Jerald presents to clinic for reevaluation of his Venous insufficiency.  He is wearing his compression stockings daily. He has not had any difficulties with weeping skin or infections. He complains of itchy skin and just found the Sarna that I recommended at his last visit.  He believes that this is helping and his scratched areas are improving.   His left leg is always more swollen at the end of the day, but it usually resolves over night.     Current Outpatient Medications   Medication Sig Dispense Refill     celecoxib (CELEBREX) 200 MG capsule TAKE 1 CAPSULE BY MOUTH EVERY DAY 30 capsule 0     clobetasol (TEMOVATE) 0.05 % cream Apply to your lower leg daily x2 weeks, sooner if your swelling is reduced. 60 g 0     lisinopril (PRINIVIL,ZESTRIL) 5 MG tablet TAKE 1 TABLET (5 MG TOTAL) BY MOUTH DAILY. 90 tablet 1     metoprolol succinate (TOPROL-XL) 200 MG 24 hr tablet Take 1 tablet (200 mg total) by mouth daily. 90 tablet 3     rosuvastatin (CRESTOR) 10 MG tablet Take 1 tablet (10 mg total) by mouth at bedtime. 30 tablet 1     warfarin (COUMADIN) 5 MG tablet TAKE 5 TO 7.5MG (1 OR 1.5 TABS) BY MOUTH DAILY AS DIRECTED. ADJUST DOSE BASED ON INR. 110 tablet 1     Current Facility-Administered Medications   Medication Dose Route Frequency Provider Last Rate Last Dose     lidocaine 2 % jelly (XYLOCAINE)   Topical PRN Nany Levine, CNP   1 application at 12/28/16 1531       Allergies   Allergen Reactions     Pravastatin      Memory changes, feels foggy        Physical Exam:    Vitals:    11/21/18 1433   BP: 125/82   Pulse: (!) 107   Temp: 98.9  F (37.2  C)    There is no height or weight on file to calculate BMI.    General:  58 y.o. male in no apparent distress.    Psychiatric:  Alert and oriented x 3.  Cooperative.   Integumentary:  Skin is uniformly warm, dry and pink.  Lower extremity edema:  minimal on left leg  Ulcerations:  none    Vasc Edema 8/23/2017 9/20/2017 11/8/2017 11/22/2017 11/21/2018   Right just above MTP 24.3 23.6 23.5 21 23   Right Ankle 25.2 23.7 24.6 22 24.2   Right Widest Calf 33.5 37.6 36 34 34.5   Right Thigh Up 10cm - - - - -   Left - just above MTP 23.5 22.5 23.5 22 25   Left Ankle 27.5 24.5 26.3 24 25.2   Left Widest Calf 36.8 34.5 39 37 37.3   Left Thigh Up 10cm - - - - -       Assessment:  1. Chronic venous insufficiency  Compression stockings   2. Localized pruritus         A new wound was identified today: no.    Plan:  1.  Venous insufficiency, stable, but edema slightly worse.  At his last visit, he was in need of a new pair of compression stockings, but he has not obtained any    2.   Dermatitis, resolved     3.   Pruritus, he will continue to use sarna. He can also try benadryl cream or calamine lotion.    4.  Treatment:  Will continue to use sarna. Wrote a prescription for compression stockings, 30-40 mmHg to see if it beneficial at preventing leg swelling when he is on his feet all day.    5.   Patient will follow up with me prn.    Nany Levine, APRN, CNP,  [facility]  [phone]

## 2021-06-21 NOTE — PROGRESS NOTES
Office Visit - Physical   Damien Mcclendon   58 y.o.  male    Date of visit: 11/1/2018  Physician: Ronald Webb MD     Assessment and Plan   1. Routine general medical examination at a health care facility  He needs to give up the cigarettes.  Urged better diet.  DEXA today.  Flu shot and Pneumovax today.  - Comprehensive Metabolic Panel  - Urinalysis-UC if Indicated  - LDL Cholesterol, Direct  - HM2(CBC w/o Differential)  - Cholesterol, Total  - HDL Cholesterol    2. Chronic atrial fibrillation (H)  Rate is rapid.  Needs better rate control.  Refer back to cardiology.  Likely will need dig or dilt.  Blood pressure on lower side for addition of diltiazem.  - Electrocardiogram Perform - Clinic  - Ambulatory referral to Cardiology    3. Pulmonary embolism (H)  INR today.    4. Nicotine Dependence  Urged cessation.    5. Mixed hyperlipidemia  He does not fast today.  We will check LDL direct HDL and total cholesterols.    6. Memory Lapses Or Loss  He states memory is stable.    7. History of DVT (deep vein thrombosis)  INR today peer    8. Chronic Cutaneous Ulcer Venous Stasis  I have suggested Sarna and compression as per previous.  Follow-up with vascular center.  - Ambulatory referral to Vascular Center    9. Poor compliance  Counseled patient that its imperative he start being more compliant.    10. High risk medication use  Labs as above.    11. Macrocytosis  Question whether he is minimizing his alcohol use.  B12 and folate were normal.  - HM2(CBC w/o Differential)    12. Hyperglycemia  Check A1c.  - Glycosylated Hemoglobin A1c    13. Osteopenia  Due for bone density.  Urged smoking cessation.  - DXA Bone Density Scan; Future        Return in about 6 months (around 5/1/2019) for Recheck.     Chief Complaint   Chief Complaint   Patient presents with     Annual Exam     not fasting         Patient Profile   Social History     Social History Narrative    Lives with his wife        Past Medical History   Patient  Active Problem List   Diagnosis     Nicotine Dependence     Avascular Necrosis     Mixed hyperlipidemia     Chronic Cutaneous Ulcer Venous Stasis     Pulmonary embolism (H)     Atrial Fibrillation     Male Erectile Disorder     Memory Lapses Or Loss     Abnormal Weight Loss     History of DVT (deep vein thrombosis)     Hip fracture (H)     Macrocytosis       Past Surgical History  He has a past surgical history that includes Hernia repair (2012); Joint replacement (Bilateral, 2002); Joint replacement (2007); and Eye surgery (1972).     History of Present Illness   This 58 y.o. old poorly compliant gentleman with multiple medical problems comes in today for follow-up.  I have not seen him in a year.  He has ongoing tobacco abuse.  He has atrial fibrillation on chronic warfarin therapy.  He has had poor rate control but is had for poor follow-up as well with both myself and cardiology.  He denies any palpitations lightheadedness or dizziness.  He continues to smoke.  He states he only has a couple drinks a day.  He was macrocyte ptotic at our last check however.  His wife is a alcoholic.  I question of questioned if he understates his drinking in the past.  He states he is going to be losing his job at the end of the year.  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  He lost a tooth recently.  It just fell out.  He denies illicit drug use.  Otherwise he denies concerns.  He has chronic venous stasis and venous ulcers and he has not followed up with the wound clinic.  He has not been using any creams.  He was on clobetasol but they discontinued clobetasol and he was told to use Sarna and compression and he is not been doing either.    In addition was atrial fibrillation he also has a history of DVT and PE, another reason he is on anticoagulation.    Review of Systems: A comprehensive review of systems was negative except as noted.     Medications and Allergies    Current Outpatient Prescriptions   Medication Sig Dispense Refill     celecoxib (CELEBREX) 200 MG capsule TAKE 1 CAPSULE BY MOUTH EVERY DAY 30 capsule 0     clobetasol (TEMOVATE) 0.05 % cream Apply to your lower leg daily x2 weeks, sooner if your swelling is reduced. 60 g 0     lisinopril (PRINIVIL,ZESTRIL) 5 MG tablet TAKE 1 TABLET (5 MG TOTAL) BY MOUTH DAILY. 90 tablet 1     metoprolol succinate (TOPROL-XL) 200 MG 24 hr tablet Take 1 tablet (200 mg total) by mouth daily. 90 tablet 3     warfarin (COUMADIN) 5 MG tablet TAKE 5 TO 7.5MG (1 OR 1.5 TABS) BY MOUTH DAILY AS DIRECTED. ADJUST DOSE BASED ON INR. 110 tablet 1     Current Facility-Administered Medications   Medication Dose Route Frequency Provider Last Rate Last Dose     lidocaine 2 % jelly (XYLOCAINE)   Topical PRN Nany Levine CNP   1 application at 12/28/16 1531     No Known Allergies     Family and Social History   Family History   Problem Relation Age of Onset     Diabetes Mother      Heart disease Mother      Obesity Mother      Heart disease Father      Varicose Veins Father      No Medical Problems Daughter         Social History   Substance Use Topics     Smoking status: Current Every Day Smoker     Smokeless tobacco: Current User      Comment: stopped x 3 months ago     Alcohol use 1.2 oz/week     2 Cans of beer per week      Comment: drinks 1-2 times per week.        Physical Exam   General Appearance:   Pleasant gentleman.  Somewhat disheveled in appearance.    /74 (Patient Site: Right Arm, Patient Position: Sitting, Cuff Size: Adult Regular)  Pulse 97  Ht 6' (1.829 m)  Wt 180 lb (81.6 kg)  SpO2 97%  BMI 24.41 kg/m2    EYES: Eyelids, conjunctiva, and sclera were normal. Pupils were normal. Cornea, iris, and lens were normal bilaterally.  HEAD, EARS, NOSE, MOUTH, AND THROAT: Head and face were normal. Hearing was normal to voice and the ears were normal to external exam. Nose appearance was normal and there was no discharge.  Oropharynx was normal.  Dentition very poor.  NECK: Neck appearance was normal. There were no neck masses and the thyroid was not enlarged.  RESPIRATORY: Breathing pattern was normal and the chest moved symmetrically.  Percussion/auscultatory percussion was normal.  Lung sounds were normal and there were no abnormal sounds.  CARDIOVASCULAR: Irregularly irregular tachycardic rhythm S1 and S2 were normal and there were no extra sounds or murmurs. Peripheral pulses in arms and legs were normal.  Jugular venous pressure was normal.  Chronic venous stasis changes to skin with venous stasis dermatitis and ulcers left leg greater than right.  Significant varicosities bilaterally.  Left greater than right.  GASTROINTESTINAL: The abdomen was normal in contour.  Bowel sounds were present.  Percussion detected no organ enlargement or tenderness.  Palpation detected no tenderness, mass, or enlarged organs.   MUSCULOSKELETAL: Skeletal configuration was normal and muscle mass was normal for age. Joint appearance was overall normal.  LYMPHATIC: There were no enlarged nodes.  SKIN/HAIR/NAILS: Skin color was normal.  There were no skin lesions.  Hair and nails were normal.  NEUROLOGIC: The patient was alert and oriented to person, place, time, and circumstance. Speech was normal. Cranial nerves were normal. Motor strength was normal for age. The patient was normally coordinated.  PSYCHIATRIC:  Mood and affect were normal and the patient had normal recent and remote memory. The patient's judgment and insight were normal.    ADDITIONAL VITAL SIGNS: None  CHEST WALL/BREASTS: Normal  RECTAL: Normal rectal tone.  Prostate normal in consistency and size for age peer  GENITAL/URINARY: Normal     Additional Information        Ronald Webb MD  Internal Medicine  Contact me at [phone]

## 2021-06-21 NOTE — LETTER
Letter by Rosa Maria Winkler RN at      Author: Rosa Maria Winkler RN Service: -- Author Type: --    Filed:  Encounter Date: 3/24/2021 Status: (Other)         Damien Mcclendon  [address]  [place] [address]      March 24, 2021      Dear Mr. Munroeh,    You are currently under the care of [facility] for your warfarin (Coumadin ) therapy.  We are contacting you because our records show you were due for an INR on 3/4/2021.    There are potentially serious risks when taking warfarin without careful monitoring and we want to make sure you are safely managed.  Routine INR monitoring is required for warfarin refills.     Please call [phone] as soon as possible to schedule an appointment.  If there has been a change in your care or other concerns, please let us know so we can help and or update our records.     Sincerely,       [facility]

## 2021-06-21 NOTE — PROGRESS NOTES
Health system Heart Care Note    Assessment:    Damien Mcclendon is a 58 y.o. old male with HL, AF, h/o DVT, PE, smoking, poor compliance, venous ulcers here for optimization of rate control/AF.        Plan:    # AF - persistent, asymptomatic  - continue warfarin, continue BB - metop 200mg daily  - will get another TTE to evaluate ventricular and valvular fxn. And a Holter to look at what his rate control really is (reportedly 130 on check in this am but my re-measurement is in 80's) prior to making any med changes  - if still rapid, would likely add CCB as digoxin would be very unlikely to be effective w/ exertion, which is when he seems to get most tachycardia     # HTN - continue lisinopril,BB as per above     # HL - continue rosuva, titrate per PCP    # Smoking cessation - long discussion on need to quit. He expressed willingness and understanding. Will plan to quot, then call us w/in 1 mos to confirm compliance    # Venous ulcers/edema - much improved control w/ compression stockings    ANNETTE LUNA  [facility]  [phone]  ______________________________________________________________________    Subjective:  CC: AF    I had the opportunity to see Damien Mcclendon at the Health system [facility] Clinic. Damien Mcclendon is a 58 y.o. male with a known history of HL, AF, h/o DVT, PE, smoking, poor compliance, venous ulcers here for optimization of rate control/AF.    Since the last time I saw him, for the most part he has done well, quit smoking for 6 mos but then got back to it. Usually, his HR runs in the 8-'s based on what he is tellig me, w/ occasional bouts of 100's. No CP/SOB/BAEZA/orthopbea/PND/syncope/N/V/D/F/C/wt.changes. 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    He saw his PCP earlier and after running up to the appointment was found to be tachycardic in low 100's and referred to us.    ______________________________________________________________________      Review of Systems:   As  noted in HPI, all others reviewed and are negative      Problem List:  Patient Active Problem List   Diagnosis     Nicotine Dependence     Avascular Necrosis     Mixed hyperlipidemia     Chronic Cutaneous Ulcer Venous Stasis     Pulmonary embolism (H)     Atrial Fibrillation     Male Erectile Disorder     Memory Lapses Or Loss     Abnormal Weight Loss     History of DVT (deep vein thrombosis)     Hip fracture (H)     Macrocytosis     Medical History:  Past Medical History:   Diagnosis Date     Arthritis      Atrial fibrillation (H)      History of transfusion      Hypertension      PVD (peripheral vascular disease) (H)     reports leaky blood vessels in left leg     Surgical History:  Past Surgical History:   Procedure Laterality Date     EYE SURGERY  1972    had cataract removed after BB lodged in eye     HERNIA REPAIR  2012     JOINT REPLACEMENT Bilateral 2002    Left hip     JOINT REPLACEMENT  2007    right hip     Social History:  Social History     Socioeconomic History     Marital status:      Spouse name: Not on file     Number of children: 1     Years of education: Not on file     Highest education level: Not on file   Social Needs     Financial resource strain: Not on file     Food insecurity - worry: Not on file     Food insecurity - inability: Not on file     Transportation needs - medical: Not on file     Transportation needs - non-medical: Not on file   Occupational History     Occupation: [employer]   Tobacco Use     Smoking status: Current Every Day Smoker     Smokeless tobacco: Current User     Tobacco comment: stopped x 3 months ago   Substance and Sexual Activity     Alcohol use: Yes     Alcohol/week: 1.2 oz     Types: 2 Cans of beer per week     Comment: drinks 1-2 times per week.     Drug use: No     Sexual activity: Yes   Other Topics Concern     Not on file   Social History Narrative    Lives with his wife           Family History:  Family History   Problem Relation Age  of Onset     Diabetes Mother      Heart disease Mother      Obesity Mother      Heart disease Father      Varicose Veins Father      No Medical Problems Daughter          Allergies:  Allergies   Allergen Reactions     Pravastatin      Memory changes, feels foggy        Medications:  Current Outpatient Medications   Medication Sig Dispense Refill     celecoxib (CELEBREX) 200 MG capsule TAKE 1 CAPSULE BY MOUTH EVERY DAY 30 capsule 0     clobetasol (TEMOVATE) 0.05 % cream Apply to your lower leg daily x2 weeks, sooner if your swelling is reduced. 60 g 0     lisinopril (PRINIVIL,ZESTRIL) 5 MG tablet TAKE 1 TABLET (5 MG TOTAL) BY MOUTH DAILY. 90 tablet 1     metoprolol succinate (TOPROL-XL) 200 MG 24 hr tablet Take 1 tablet (200 mg total) by mouth daily. 90 tablet 3     rosuvastatin (CRESTOR) 10 MG tablet Take 1 tablet (10 mg total) by mouth at bedtime. 30 tablet 1     warfarin (COUMADIN) 5 MG tablet TAKE 5 TO 7.5MG (1 OR 1.5 TABS) BY MOUTH DAILY AS DIRECTED. ADJUST DOSE BASED ON INR. 110 tablet 1     Current Facility-Administered Medications   Medication Dose Route Frequency Provider Last Rate Last Dose     lidocaine 2 % jelly (XYLOCAINE)   Topical PRN Nany Levine, CNP   1 application at 12/28/16 1531       Objective:   Vital signs:  /82 (Patient Site: Left Arm, Patient Position: Sitting, Cuff Size: Adult Regular)   Pulse (!) 120   Resp 16   Ht 6' (1.829 m)   Wt 184 lb (83.5 kg)   BMI 24.95 kg/m        Physical Exam:    GENERAL APPEARANCE: Alert, cooperative and in no acute distress.   HEENT: No scleral icterus. Oral mucuos membranes pink and moist.   NECK: JVP 7. No Hepatojugular reflux. Thyroid not visualized. No lymphadenopathy   CHEST: clear to auscultation   CARDIOVASCULAR: S1, S2 without murmur ,clicks or rubs. Brachial, radial and posterior tibial pulses are intact and symetric. No carotid bruits noted. No edema  ABDOMEN: Nontender. BS+. No bruits.   SKIN: No Xanthelasma   Musculoskeletal: No  cyanosis, clubbing or swelling.      Lab Results:  LIPIDS:  Lab Results   Component Value Date    CHOL 205 (H) 11/01/2018    CHOL 147 12/21/2016    CHOL 176 12/14/2015     Lab Results   Component Value Date    HDL 51 11/01/2018    HDL 66 12/21/2016    HDL 62 12/14/2015     Lab Results   Component Value Date    LDLCALC 75 12/21/2016    LDLCALC 101 12/14/2015    LDLCALC 99 03/31/2015     Lab Results   Component Value Date    TRIG 31 12/21/2016    TRIG 66 12/14/2015    TRIG 76 03/31/2015     No components found for: CHOLHDL    BMP:  Lab Results   Component Value Date    CREATININE 1.13 11/01/2018    BUN 26 (H) 11/01/2018     11/01/2018    K 5.9 (H) 11/01/2018     11/01/2018    CO2 27 11/01/2018         [facility]

## 2021-06-23 ENCOUNTER — COMMUNICATION - HEALTHEAST (OUTPATIENT)
Dept: ANTICOAGULATION | Facility: CLINIC | Age: 61
End: 2021-06-23

## 2021-06-23 NOTE — TELEPHONE ENCOUNTER
Refill Approved    Rx renewed per Medication Renewal Policy. Medication was last renewed on 11/6/18.    Ov: 11/1/18    Noelle Poole, [facility] Triage/Med Refill 1/10/2019     Requested Prescriptions   Pending Prescriptions Disp Refills     rosuvastatin (CRESTOR) 10 MG tablet [Pharmacy Med Name: ROSUVASTATIN CALCIUM 10 MG TAB] 30 tablet 1     Sig: TAKE 1 TABLET BY MOUTH EVERYDAY AT BEDTIME    Statins Refill Protocol (Hmg CoA Reductase Inhibitors) Passed - 1/8/2019  4:05 PM       Passed - PCP or prescribing provider visit in past 12 months     Last office visit with prescriber/PCP: 11/16/2017 Ronald Webb MD OR same dept: Visit date not found OR same specialty: 11/16/2017 Ronald Webb MD  Last physical: 11/1/2018 Last MTM visit: Visit date not found   Next visit within 3 mo: Visit date not found  Next physical within 3 mo: Visit date not found  Prescriber OR PCP: Ronald Webb MD  Last diagnosis associated with med order: There are no diagnoses linked to this encounter.  If protocol passes may refill for 12 months if within 3 months of last provider visit (or a total of 15 months).

## 2021-06-23 NOTE — TELEPHONE ENCOUNTER
ANTICOAGULATION  MANAGEMENT    Assessment     Today's INR result of 2.10 is Therapeutic (goal INR of 2.0-3.0)        Warfarin taken as previously instructed    No new diet changes affecting INR    No new medication/supplements affecting INR    Continues to tolerate warfarin with no reported s/s of bleeding or thromboembolism     Previous INR was Subtherapeutic aet 1.70 on 12/28/18.    Plan:     Left a detailed message for Damien regarding INR result and instructed:     Warfarin Dosing Instructions:   -  Continue current warfarin dose 7.5 mg daily on Thursdays; and 5 mg daily rest of week.    Instructed patient to follow up no later than:  2 wks.    Education provided: target INR goal and significance of current INR result      Instructed to call the [facility] for any changes, questions or concerns. (#[phone])   ?   Rosa Maria Winkler RN    Subjective/Objective:      Damienvenu Mcclendon, a 58 y.o. male is on warfarin.     Damien reports:     Home warfarin dose: as updated on anticoagulation calendar per template     Missed doses: No     Medication changes:  No     S/S of bleeding or thromboembolism:  No     New Injury or illness:  No     Changes in diet or alcohol consumption:  No     Upcoming surgery, procedure or cardioversion:  No    Anticoagulation Episode Summary     Current INR goal:   2.0-3.0   TTR:   49.0 % (3.9 y)   Next INR check:   2/1/2019   INR from last check:   2.10 (1/18/2019)   Weekly max warfarin dose:      Target end date:      INR check location:      Preferred lab:      Send INR reminders to:   ANTICOAGULATION POOL C (DTN,VAD,CGR,GAV)    Indications    History of DVT (deep vein thrombosis) [Z86.718]           Comments:            Anticoagulation Care Providers     Provider Role Specialty Phone number    Ronald Webb MD Referring Internal Medicine [phone]

## 2021-06-23 NOTE — TELEPHONE ENCOUNTER
RN cannot approve Refill Request    RN can NOT refill this medication med is not covered by policy/route to provider.    Albert Bourgeois, [facility] Triage/Med Refill 2/7/2019    Requested Prescriptions   Pending Prescriptions Disp Refills     celecoxib (CELEBREX) 200 MG capsule [Pharmacy Med Name: CELECOXIB 200 MG CAPSULE] 30 capsule 0     Sig: TAKE 1 CAPSULE BY MOUTH EVERY DAY    There is no refill protocol information for this order

## 2021-06-24 NOTE — TELEPHONE ENCOUNTER
ANTICOAGULATION  MANAGEMENT    Assessment     Today's INR result of 3.00 is Therapeutic (goal INR of 2.0-3.0)        Warfarin taken as previously instructed    No new diet changes affecting INR    No new medication/supplements affecting INR    Continues to tolerate warfarin with no reported s/s of bleeding or thromboembolism     Previous INR was Therapeutic at 2.10 on 1/18/19.    Plan:     Left a detailed message for Damien regarding INR result and instructed:     Warfarin Dosing Instructions:    - Continue current warfarin dose 7.5 mg daily on Thursdays; and 5 mg daily rest of week.    Instructed patient to follow up no later than:  3-4 wks.    Education provided: importance of consistent vitamin K intake, target INR goal and significance of current INR result and importance of notifying clinic for changes in medications.    Instructed to call the [facility] for any changes, questions or concerns. (#[phone])   ?   Rosa Maria Winkler RN    Subjective/Objective:      Damien Mcclendon, a 58 y.o. male is on warfarin.     Damien reports:     Home warfarin dose: as updated on anticoagulation calendar per template     Missed doses: No     Medication changes:  No     S/S of bleeding or thromboembolism:  No     New Injury or illness:  Yes:   Lost his job and no insurance, he reported on 2/15/19.  (requesting meds to be filled, as he is all out.)     Changes in diet or alcohol consumption:  No     Upcoming surgery, procedure or cardioversion:  No    Anticoagulation Episode Summary     Current INR goal:   2.0-3.0   TTR:   50.0 % (3.9 y)   Next INR check:   3/15/2019   INR from last check:   3.00 (2/15/2019)   Weekly max warfarin dose:      Target end date:      INR check location:      Preferred lab:      Send INR reminders to:   ANTICOAGULATION POOL C (DTN,VAD,CGR,GAV)    Indications    History of DVT (deep vein thrombosis) [Z86.718]           Comments:            Anticoagulation Care Providers     Provider Role  Specialty Phone number    Ronald Webb MD Referring Internal Medicine [phone]

## 2021-06-24 NOTE — TELEPHONE ENCOUNTER
Pt was here for his protime and stated he lost his job and has no insurance so is in need of his three meds to be filled and sent to a new pharmacy [facility] location he needs warfarin 5mg  Metrprolol 200mg ? He thinks and Lisinopril 5mg cheaper prices there, please call pt today when called in he is out, [phone]

## 2021-06-25 ENCOUNTER — AMBULATORY - HEALTHEAST (OUTPATIENT)
Dept: LAB | Facility: CLINIC | Age: 61
End: 2021-06-25

## 2021-06-25 ENCOUNTER — TRANSFERRED RECORDS (OUTPATIENT)
Dept: HEALTH INFORMATION MANAGEMENT | Facility: CLINIC | Age: 61
End: 2021-06-25

## 2021-06-25 ENCOUNTER — COMMUNICATION - HEALTHEAST (OUTPATIENT)
Dept: ANTICOAGULATION | Facility: CLINIC | Age: 61
End: 2021-06-25

## 2021-06-25 DIAGNOSIS — Z86.718 HISTORY OF DVT (DEEP VEIN THROMBOSIS): ICD-10-CM

## 2021-06-25 DIAGNOSIS — I27.82 CHRONIC PULMONARY EMBOLISM, UNSPECIFIED PULMONARY EMBOLISM TYPE, UNSPECIFIED WHETHER ACUTE COR PULMONALE PRESENT (H): ICD-10-CM

## 2021-06-25 DIAGNOSIS — I48.11 LONGSTANDING PERSISTENT ATRIAL FIBRILLATION (H): ICD-10-CM

## 2021-06-25 LAB
CREATININE (EXTERNAL): 1.07 MG/DL (ref 0.72–1.25)
GFR ESTIMATED (EXTERNAL): >60 ML/MIN/1.73M2
GFR ESTIMATED (IF AFRICAN AMERICAN) (EXTERNAL): >60 ML/MIN/1.73M2
GLUCOSE (EXTERNAL): 132 MG/DL (ref 65–100)
INR PPP: 1.4 (ref 0.9–1.1)
POTASSIUM (EXTERNAL): 4.5 MMOL/L (ref 3.5–5)

## 2021-06-26 NOTE — TELEPHONE ENCOUNTER
ANTICOAGULATION  MANAGEMENT PROGRAM    Damien Mcclendon is overdue for INR check.     Spoke with Damien and scheduled INR appointment on 7/1 @ [facility].      Rosa Maria Winkler RN

## 2021-06-29 NOTE — PROGRESS NOTES
Progress Notes by Myhre, David J, RN at 8/10/2020  8:00 AM     Author: Myhre, David J, RN Service: -- Author Type: Registered Nurse    Filed: 8/15/2020  4:30 PM Encounter Date: 8/10/2020 Status: Signed    : Myhre, David J, RN (Registered Nurse)       Clinic Care Coordination Contact    Clinic Care Coordination Contact  OUTREACH    Referral Information:  Referral Source: PCP    Primary Diagnosis: Psychosocial    Chief Complaint   Patient presents with   ? Clinic Care Coordination - Initial        Universal Utilization:   Clinic Utilization  Difficulty keeping appointments:: No  Compliance Concerns: No  No-Show Concerns: No  No PCP office visit in Past Year: No  Utilization    Last refreshed: 8/14/2020  2:50 PM:  Hospital Admissions 0           Last refreshed: 8/14/2020  2:50 PM:  ED Visits 1           Last refreshed: 8/14/2020  2:50 PM:  No Show Count (past year) 1              Current as of: 8/14/2020  2:50 PM              Clinical Concerns:  Current Medical Concerns:  Patient has a history of chronic cutaneous ulcer venous statis, DVT, tobacco abuse, PE, atrial fibrillation, hip fracture, prediabetes, age-related osteoporosis, pulmonary embolism, thrombocytopenia.   Patient's primary concern is his current lack of medical insurance. Patient reported he was furloughed from his job at [employer] in March and eventually lost his health insurance. Patient reported he is back at work, but was only hired back at 16 hours a week and was not eligible for health insurance through his employer. Patient stated he has been able to afford his medications currently, but has past medical bills that he can not afford at this time. A referral was sent to the [facility] Financial Resources Worker to possibly assist with these concerns.   As noted above, patient has a history of PE and DVTs. He stated he has not been getting his INRs done as often as he should be related to lack of medical insurance and concerns about continued  medical debt. He was encouraged to continue to have his INRs drawn as directed by the [facility] and work with the RN on his team there regarding his warfarin dosing. He stated he has a goal to have his INRs to be within therapeutic range consistently in the next three months.   Smoking cessation was discussed with patient during assessment today. Patient stated he was not ready to stop smoking currently. He did appear toP understand the effect smoking has on his health.   Current Behavioral Concerns: Patient denied any mental health concerns at this time.     Education Provided to patient: Discussed the importance of taking his medications daily as directed. Encouraged patient to attend all scheduled appointments, including his INR draws. Discussed the importance of washing his hands frequently, keeping his hands away from his face, masking in public and engaging in in social distancing at this time related to his chronic conditions.    Pain  Pain (GOAL):: No  Health Maintenance Reviewed: Up to date     Medication Management:  Patient manages his medications independently. He denied needing assistance with medication set up or education. He stated he is compliant with his current medications.      Functional Status:  Dependent ADLs:: Independent  Dependent IADLs:: Independent  Bed or wheelchair confined:: No  Mobility Status: Independent  Fallen 2 or more times in the past year?: No  Any fall with injury in the past year?: No    Living Situation:  Current living arrangement:: I live in a private home with family  Type of residence:: Apartment    Lifestyle & Psychosocial Needs:        Diet:: Regular  Inadequate nutrition (GOAL):: No  Tube Feeding: No  Inadequate activity/exercise (GOAL):: Yes  Significant changes in sleep pattern (GOAL): No  Transportation means:: Public transportation(Cab)   Will assist patient with applying for [employer].      Hinduism or spiritual beliefs that impact  treatment:: No  Mental health DX:: No  Mental health management concern (GOAL):: No  Informal Support system:: Spouse, Friends, Neighbors, Family   Socioeconomic History   ? Marital status:      Spouse name: Not on file   ? Number of children: 1   ? Years of education: Not on file   ? Highest education level: Not on file   Occupational History   ? Occupation: [employer]     Tobacco Use   ? Smoking status: Current Every Day Smoker   ? Smokeless tobacco: Current User   ? Tobacco comment: stopped x 3 months ago   Substance and Sexual Activity   ? Alcohol use: Yes     Alcohol/week: 2.0 standard drinks     Types: 2 Cans of beer per week     Comment: drinks 1-2 times per week.   ? Drug use: No   ? Sexual activity: Yes          Financial Concerns: As noted earlier, patient does not health insurance and currently can not afford to pay his past medical bills. He stated he is able to cover his rent, medication and food on his current income.       Resources and Interventions:  Current Resources:      Community Resources: None  Supplies used at home:: None  Equipment Currently Used at Home: none    Advance Care Plan/Directive  Advanced Care Plans/Directives on file:: No  Advanced Care Plan/Directive Status: In Process    Referrals Placed: None     Goals:   Goals        General    Financial Wellbeing (pt-stated)     Notes - Note edited  8/15/2020  3:37 PM by Myhre, David J, RN    Goal Statement: I would like to have medical insurance to help cover the cost of my medications and medical appointments in the next 3 months.   Date Goal set: 8/10/20  Barriers: Patient currently does not have medical insurance.   Strengths: Strong advocate for himself.   Date to Achieve By: 11/10/20  Patient expressed understanding of goal: Yes  Action steps to achieve this goal:  1. I will understand the [facility] RNJacinto will request a referral to the [facility] Financial Resources Guide, Mary to discuss possible medical insurance  programs that may be available to me.   2. I understand Mary will contact me directly.   3. I will provide any necessary documentation and complete as paperwork that may be associated with this goal in a timely manner.   4. I will update the [facility] Community Health Worker, Sara at scheduled outreach phone calls with my progress towards this goal.         Financial Wellbeing (pt-stated)     Notes - Note created  8/15/2020  3:37 PM by Myhre, David J, RN    Goal Statement: I would like to know if I am eligible for assistance with past [facility] medical bills in the next 3 months.   Date Goal set: 8/10/20  Barriers: Patient does not have medical insurance.   Strengths: Patient is a strong advocate for himself.   Date to Achieve By: 11/30/20  Patient expressed understanding of goal: Yes  Action steps to achieve this goal:  1. I understand the [facility] RNJacinto will request a referral for the [facility] Financial Resource Worker, Mary to discuss eligibility for programs that may assist with past medical bills.   2. I understand Mary will call me directed.   3. I will provide any necessary documentation an complete any paperwork that may be associated with this goal in a timely manner.         Medical (pt-stated)     Notes - Note created  8/15/2020  3:18 PM by Myhre, David J, RN    Goal Statement: I would like to get my INR under better control in the next three months.   Date Goal set: 8/10/20  Barriers: INR is not always within therapeutic range.   Strengths: Patient has a strong willingness to do what he has to do better his health.  Date to Achieve By: 11/10/20  Patient expressed understanding of goal: Yes  Action steps to achieve this goal:  1. I will continue to take my warfarin daily as scheduled.  2. I will continue to attend all INR lab draws.  3. I will continue to work with the [facility] An[facility] RN regarding my warfarin dosing and adhere to diet recommendations made by her.   4. I will continue to attend  all follow-up appointments with my PCP.  5. I will report progress towards this goal at scheduled outreach calls from the [facility] team.           Transportation (pt-stated)     Notes - Note created  8/15/2020  3:08 PM by Myhre, David J, RN    Goal Statement: Goal Statement- I would like to apply for [employer] within the next 6 months.  Date Goal set: 8/10/20  Barriers: Patient has transportation needs  Strengths: Patient is a strong advocate for himself.   Date to Achieve By: 2/10/21  Patient expressed understanding of goal: Yes  Action steps to achieve this goal  1.  The Community Health Worker will mail me the application and information for [employer].  2.  I will review the application and complete the Certification Questionnaire section and send or give to my PCP. If I need assistance with this, I will request an appointment.  3.  The Doctor will complete the Professional Verification Form section of the application and the Community Health Worker will coordinate mailing both sections in.  4. I will notify the Community Health Worker once I find out if I am approved or denied [employer].                 Patient/Caregiver understanding: Verbalized understanding goals and other information discussed at today's assessment.        Future Appointments              In 4 days [facility] LAB [facility], [facility]    In 5 months Ronald Webb MD [facility], [facility]          Plan: CCC RN will continue to monitor, send referral to [facility] FRW, support patient with current goals and will be available to assist as additional nursing needs arise. [facility] CHW will assist with [employer] and will support patient with other goals through scheduled outreach telephone calls.

## 2021-07-02 ENCOUNTER — AMBULATORY - HEALTHEAST (OUTPATIENT)
Dept: LAB | Facility: CLINIC | Age: 61
End: 2021-07-02

## 2021-07-02 ENCOUNTER — COMMUNICATION - HEALTHEAST (OUTPATIENT)
Dept: ANTICOAGULATION | Facility: CLINIC | Age: 61
End: 2021-07-02

## 2021-07-02 DIAGNOSIS — Z86.718 HISTORY OF DVT (DEEP VEIN THROMBOSIS): ICD-10-CM

## 2021-07-02 DIAGNOSIS — I27.82 CHRONIC PULMONARY EMBOLISM, UNSPECIFIED PULMONARY EMBOLISM TYPE, UNSPECIFIED WHETHER ACUTE COR PULMONALE PRESENT (H): ICD-10-CM

## 2021-07-02 DIAGNOSIS — I48.11 LONGSTANDING PERSISTENT ATRIAL FIBRILLATION (H): ICD-10-CM

## 2021-07-02 LAB — INR PPP: 1.9 (ref 0.9–1.1)

## 2021-07-03 NOTE — ADDENDUM NOTE
Addendum Note by Raz Paulson RN at 2/27/2017  9:55 AM     Author: Raz Paulson RN Service: -- Author Type: Registered Nurse    Filed: 2/27/2017  9:55 AM Encounter Date: 2/23/2017 Status: Signed    : Raz Paulson RN (Registered Nurse)    Addended by: RAZ PAULSON on: 2/27/2017 09:55 AM        Modules accepted: Orders

## 2021-07-03 NOTE — ADDENDUM NOTE
Addendum Note by Gabby Elias RN at 1/12/2017 10:56 AM     Author: Gabby Elias RN Service: -- Author Type: Registered Nurse    Filed: 1/12/2017 10:56 AM Encounter Date: 1/11/2017 Status: Signed    : Gabby Elias RN (Registered Nurse)    Addended by: GABBY ELIAS on: 1/12/2017 10:56 AM        Modules accepted: Orders

## 2021-07-03 NOTE — ADDENDUM NOTE
Addendum Note by Gabby Elias RN at 1/18/2017  3:55 PM     Author: Gabby Elias RN Service: -- Author Type: Registered Nurse    Filed: 1/18/2017  3:55 PM Encounter Date: 1/18/2017 Status: Signed    : Gabby Elias RN (Registered Nurse)    Addended by: GABBY ELIAS on: 1/18/2017 03:55 PM        Modules accepted: Orders

## 2021-07-03 NOTE — ADDENDUM NOTE
Addendum Note by Claudette Dao MA at 11/1/2018  2:50 PM     Author: Claudette Dao MA Service: -- Author Type: Medical Assistant    Filed: 11/1/2018  2:50 PM Encounter Date: 11/1/2018 Status: Signed    : Claudette Dao MA (Medical Assistant)    Addended by: CLAUDETTE DAO on: 11/1/2018 02:50 PM        Modules accepted: Orders

## 2021-07-04 NOTE — TELEPHONE ENCOUNTER
"Telephone Encounter by Rosa Maria Winkler, RN at 7/2/2021  3:20 PM     Author: Rosa Maria Winkler RN Service: -- Author Type: Registered Nurse    Filed: 7/2/2021  3:56 PM Encounter Date: 7/2/2021 Status: Signed    : Rosa Maria Winkler RN (Registered Nurse)       ANTICOAGULATION MANAGEMENT     Damien Mcclendon 61 y.o., male is on warfarin with Subtherapeutic INR result (goal range 2.0-3.0)    Recent labs: (last 7 days)     07/02/21  1306   INR 1.90*       ASSESSMENT     Source: Patient/Caregiver Call and Template      Warfarin dosing taken: Warfarin taken as instructed    Diet: No new diet changes affecting INR    Illness, Injury or hospitalization: Yes: on 6/25/21 presented in ED for 6 day episodes of severe diarrhea, going 15x per day.  He felt like he had \" flu symptoms.\" possibl r/t getting his 2nd dose of Covid-19 vaccination.    Medication changes: None    - 2nd dose Moderna Covid vaccinations on 6/24/21.    Signs or symptoms of bleeding or clotting: No    Previous INR: subtherapeutic at 1.40 on 6/25/21.    Additional findings: Viral Symptoms and Diarrhea has resolved.     PLAN     Recommended plan for temporary change(s) affecting INR:     Dosing instructions:   - Continue your current warfarin dose 5 mg daily on Mon/Wed/Fri; and 2.5 mg daily the rest of week,    Follow up no later than: 2 weeks     Telephone call with Damien who verbalizes understanding and agrees to plan    Patient elected to schedule next INR visit    Education provided: target INR goal and significance of current INR result    Plan made per [facility] anticoagulation protocol    Rosa Maria Winkler  Anticoagulation Clinic   [phone]    Anticoagulation Episode Summary     Current INR goal:  2.0-3.0   TTR:  32.8 % (11.1 mo)   Next INR check:  7/16/2021   INR from last check:  1.90 (7/2/2021)   Weekly max warfarin dose:     Target end date:     INR check location:     Preferred lab:     Send INR reminders to:  [facility]    " Indications    History of DVT (deep vein thrombosis) [Z86.718]           Comments:           Anticoagulation Care Providers     Provider Role Specialty Phone number    Ronald Webb MD Referring Internal Medicine [phone]

## 2021-07-07 NOTE — TELEPHONE ENCOUNTER
Telephone Encounter by Rosa Maria Winkler RN at 6/25/2021  1:17 PM     Author: Rosa Maria Winkler RN Service: -- Author Type: Registered Nurse    Filed: 6/25/2021  2:49 PM Encounter Date: 6/25/2021 Status: Signed    : Rosa Maria Winkler RN (Registered Nurse)       ANTICOAGULATION MANAGEMENT     Damien Mcclendon 61 y.o., male is on warfarin with Subtherapeutic INR result (goal range 2.0-3.0)    Recent labs: (last 7 days)     06/25/21  1309   INR 1.40*       ASSESSMENT     Source: Chart Review, Patient/Caregiver Call and Template      Warfarin dosing taken: Warfarin taken as instructed    Diet: No new diet changes affecting INR - able to eat OK.    Illness, Injury or hospitalization: Yes.  Currently in ED @ [facility] - has had the flu bug for the last 4 days.    Medication changes: None    Signs or symptoms of bleeding or clotting: No    Previous INR: therapeutic last 2(+) visits    Additional findings:   Reported getting his 2nd Covid-19 vaccinations Moderna on 6/24/21.     PLAN     Recommended plan for temporary change(s) affecting INR:     Dosing instructions:   - today, advised one time booster with 10mg warfarin dose, then continue your current warfarin dose 5 mg daily on Mon/Wed/Fri; and 2.5 mg daily rest of week.    Follow up no later than: 5-7 days    - INR scheduled on 7/1/21 @ [facility].    Telephone call with Damien who verbalizes understanding and agrees to plan    Patient offered & declined to schedule next visit - he will call to schedule INR appt.  Currently in ED @ [facility].    Education provided: importance of consistent vitamin K intake, target INR goal and significance of current INR result, importance of notifying clinic for changes in medications, when to seek medical attention/emergency care and importance of notifying clinic for diarrhea, nausea/vomiting, reduced intake and/or illness    Plan made per [facility]agulation protocol    Rosa Maria Winkler  Anticoagulation Clinic    [phone]    Anticoagulation Episode Summary     Current INR goal:  2.0-3.0   TTR:  33.5 % (10.8 mo)   Next INR check:  7/2/2021   INR from last check:  1.40 (6/25/2021)   Weekly max warfarin dose:     Target end date:     INR check location:     Preferred lab:     Send INR reminders to:  [facility]    Indications    History of DVT (deep vein thrombosis) [Z86.718]           Comments:           Anticoagulation Care Providers     Provider Role Specialty Phone number    Ronald Webb MD Referring Internal Medicine [phone]

## 2021-07-20 ENCOUNTER — DOCUMENTATION ONLY (OUTPATIENT)
Dept: INTERNAL MEDICINE | Facility: CLINIC | Age: 61
End: 2021-07-20

## 2021-07-20 NOTE — PROGRESS NOTES
Patient is overdue for INR.   Updating check date to today so patient shows on our reminder list.      Patient was due for INR on 7/16/21    Overdue INR reminder updated

## 2021-07-28 ENCOUNTER — LAB (OUTPATIENT)
Dept: LAB | Facility: CLINIC | Age: 61
End: 2021-07-28
Payer: COMMERCIAL

## 2021-07-28 ENCOUNTER — ANTICOAGULATION THERAPY VISIT (OUTPATIENT)
Dept: ANTICOAGULATION | Facility: CLINIC | Age: 61
End: 2021-07-28

## 2021-07-28 DIAGNOSIS — Z86.718 HISTORY OF DVT (DEEP VEIN THROMBOSIS): Primary | ICD-10-CM

## 2021-07-28 DIAGNOSIS — I27.82 CHRONIC PULMONARY EMBOLISM, UNSPECIFIED PULMONARY EMBOLISM TYPE, UNSPECIFIED WHETHER ACUTE COR PULMONALE PRESENT (H): ICD-10-CM

## 2021-07-28 DIAGNOSIS — I48.11 LONGSTANDING PERSISTENT ATRIAL FIBRILLATION (H): ICD-10-CM

## 2021-07-28 DIAGNOSIS — Z86.718 HISTORY OF DVT (DEEP VEIN THROMBOSIS): ICD-10-CM

## 2021-07-28 DIAGNOSIS — Z79.01 LONG TERM (CURRENT) USE OF ANTICOAGULANTS: ICD-10-CM

## 2021-07-28 LAB — INR BLD: 2.1 (ref 0.9–1.1)

## 2021-07-28 PROCEDURE — 85610 PROTHROMBIN TIME: CPT

## 2021-07-28 PROCEDURE — 36416 COLLJ CAPILLARY BLOOD SPEC: CPT

## 2021-07-28 NOTE — PROGRESS NOTES
ANTICOAGULATION MANAGEMENT     Damien Mcclendon 61 year old male is on warfarin with therapeutic INR result. (Goal INR 2.0-3.0)    Recent labs: (last 7 days)     07/28/21  1451   INR 2.1*       ASSESSMENT     Source(s): Patient/Caregiver Call and Template       Warfarin doses taken: Warfarin taken as instructed    Diet: No new diet changes identified    New illness, injury, or hospitalization: No    Medication/supplement changes: None noted    Signs or symptoms of bleeding or clotting: No    Previous INR: Subtherapeutic at 1.90 on 7/2/21.    Additional findings: None     PLAN     Recommended plan for no diet, medication or health factor changes affecting INR     Dosing Instructions: Continue your current warfarin dose with next INR in 2-3   weeks       Summary  As of 7/28/2021    Full warfarin instructions:  5 mg every Mon, Wed, Fri; 2.5 mg all other days   Next INR check:  8/18/2021             Detailed voice message left for Damien with dosing instructions and follow up date.     Contact [phone] to schedule and with any changes, questions or concerns.     Education provided: Importance of consistent vitamin K intake and Target INR goal and significance of current INR result    Plan made per [facility] anticoagulation protocol    Rosa Maria Winkler, RN  Anticoagulation Clinic  7/28/2021    _______________________________________________________________________     Anticoagulation Episode Summary     Current INR goal:  2.0-3.0   TTR:  34.1 % (11.9 mo)   Target end date:     Send INR reminders to:  [facility]    Indications    History of DVT (deep vein thrombosis) [Z86.718]           Comments:           Anticoagulation Care Providers     Provider Role Specialty Phone number    Ronald Webb MD Referring Internal Medicine [phone]

## 2021-08-05 DIAGNOSIS — E11.9 TYPE 2 DIABETES MELLITUS (H): ICD-10-CM

## 2021-08-05 DIAGNOSIS — I10 ESSENTIAL HYPERTENSION, BENIGN: Primary | ICD-10-CM

## 2021-08-05 RX ORDER — LISINOPRIL 5 MG/1
5 TABLET ORAL DAILY
Qty: 90 TABLET | Refills: 1 | Status: SHIPPED | OUTPATIENT
Start: 2021-08-05 | End: 2022-03-01

## 2021-08-06 ENCOUNTER — ANCILLARY PROCEDURE (OUTPATIENT)
Dept: BONE DENSITY | Facility: CLINIC | Age: 61
End: 2021-08-06
Attending: INTERNAL MEDICINE
Payer: COMMERCIAL

## 2021-08-06 DIAGNOSIS — M85.80 OSTEOPENIA, UNSPECIFIED LOCATION: ICD-10-CM

## 2021-08-06 PROCEDURE — 77080 DXA BONE DENSITY AXIAL: CPT | Performed by: INTERNAL MEDICINE

## 2021-08-11 DIAGNOSIS — E78.2 MIXED HYPERLIPIDEMIA: ICD-10-CM

## 2021-08-11 RX ORDER — ROSUVASTATIN CALCIUM 10 MG/1
TABLET, COATED ORAL
Qty: 90 TABLET | Refills: 1 | Status: SHIPPED | OUTPATIENT
Start: 2021-08-11 | End: 2022-02-09

## 2021-08-17 ENCOUNTER — OFFICE VISIT (OUTPATIENT)
Dept: INTERNAL MEDICINE | Facility: CLINIC | Age: 61
End: 2021-08-17
Payer: COMMERCIAL

## 2021-08-17 ENCOUNTER — ANTICOAGULATION THERAPY VISIT (OUTPATIENT)
Dept: ANTICOAGULATION | Facility: CLINIC | Age: 61
End: 2021-08-17

## 2021-08-17 VITALS
BODY MASS INDEX: 25.19 KG/M2 | HEIGHT: 72 IN | OXYGEN SATURATION: 97 % | HEART RATE: 86 BPM | WEIGHT: 186 LBS | SYSTOLIC BLOOD PRESSURE: 122 MMHG | DIASTOLIC BLOOD PRESSURE: 82 MMHG | TEMPERATURE: 98.5 F

## 2021-08-17 DIAGNOSIS — G89.29 CHRONIC BILATERAL LOW BACK PAIN WITHOUT SCIATICA: ICD-10-CM

## 2021-08-17 DIAGNOSIS — R41.3 MEMORY LOSS: ICD-10-CM

## 2021-08-17 DIAGNOSIS — M25.552 HIP PAIN, LEFT: Primary | ICD-10-CM

## 2021-08-17 DIAGNOSIS — Z72.0 TOBACCO ABUSE: ICD-10-CM

## 2021-08-17 DIAGNOSIS — R26.81 UNSTEADY GAIT: ICD-10-CM

## 2021-08-17 DIAGNOSIS — Z79.01 LONG TERM (CURRENT) USE OF ANTICOAGULANTS: ICD-10-CM

## 2021-08-17 DIAGNOSIS — I87.2 VENOUS (PERIPHERAL) INSUFFICIENCY: ICD-10-CM

## 2021-08-17 DIAGNOSIS — I10 ESSENTIAL HYPERTENSION, BENIGN: ICD-10-CM

## 2021-08-17 DIAGNOSIS — M54.50 CHRONIC BILATERAL LOW BACK PAIN WITHOUT SCIATICA: ICD-10-CM

## 2021-08-17 DIAGNOSIS — I48.11 LONGSTANDING PERSISTENT ATRIAL FIBRILLATION (H): ICD-10-CM

## 2021-08-17 DIAGNOSIS — Z86.718 HISTORY OF DVT (DEEP VEIN THROMBOSIS): Chronic | ICD-10-CM

## 2021-08-17 DIAGNOSIS — Z86.718 HISTORY OF DVT (DEEP VEIN THROMBOSIS): Primary | ICD-10-CM

## 2021-08-17 DIAGNOSIS — R73.03 PREDIABETES: ICD-10-CM

## 2021-08-17 LAB
ALBUMIN SERPL-MCNC: 3.9 G/DL (ref 3.5–5)
ALP SERPL-CCNC: 48 U/L (ref 45–120)
ALT SERPL W P-5'-P-CCNC: 41 U/L (ref 0–45)
ANION GAP SERPL CALCULATED.3IONS-SCNC: 9 MMOL/L (ref 5–18)
AST SERPL W P-5'-P-CCNC: 63 U/L (ref 0–40)
BILIRUB SERPL-MCNC: 1 MG/DL (ref 0–1)
BUN SERPL-MCNC: 25 MG/DL (ref 8–22)
CALCIUM SERPL-MCNC: 9.8 MG/DL (ref 8.5–10.5)
CHLORIDE BLD-SCNC: 102 MMOL/L (ref 98–107)
CO2 SERPL-SCNC: 25 MMOL/L (ref 22–31)
CREAT SERPL-MCNC: 1.09 MG/DL (ref 0.7–1.3)
GFR SERPL CREATININE-BSD FRML MDRD: 73 ML/MIN/1.73M2
GLUCOSE BLD-MCNC: 98 MG/DL (ref 70–125)
HBA1C MFR BLD: 5.6 % (ref 0–5.6)
INR BLD: 2.3 (ref 0.9–1.1)
POTASSIUM BLD-SCNC: 5.4 MMOL/L (ref 3.5–5)
PROT SERPL-MCNC: 7.6 G/DL (ref 6–8)
SODIUM SERPL-SCNC: 136 MMOL/L (ref 136–145)

## 2021-08-17 PROCEDURE — 85610 PROTHROMBIN TIME: CPT | Performed by: INTERNAL MEDICINE

## 2021-08-17 PROCEDURE — 80053 COMPREHEN METABOLIC PANEL: CPT | Performed by: INTERNAL MEDICINE

## 2021-08-17 PROCEDURE — 99214 OFFICE O/P EST MOD 30 MIN: CPT | Performed by: INTERNAL MEDICINE

## 2021-08-17 PROCEDURE — 36416 COLLJ CAPILLARY BLOOD SPEC: CPT | Performed by: INTERNAL MEDICINE

## 2021-08-17 PROCEDURE — 83036 HEMOGLOBIN GLYCOSYLATED A1C: CPT | Performed by: INTERNAL MEDICINE

## 2021-08-17 PROCEDURE — 36415 COLL VENOUS BLD VENIPUNCTURE: CPT | Performed by: INTERNAL MEDICINE

## 2021-08-17 RX ORDER — TRIAMCINOLONE ACETONIDE 1 MG/G
CREAM TOPICAL
Qty: 453.6 G | Refills: 1 | Status: SHIPPED | OUTPATIENT
Start: 2021-08-17

## 2021-08-17 ASSESSMENT — MIFFLIN-ST. JEOR: SCORE: 1686.69

## 2021-08-17 NOTE — PROGRESS NOTES
Office Visit - Follow Up   Damien Mcclendon   61 year old male    Date of Visit: 8/17/2021    Chief Complaint   Patient presents with     Diabetes     6 mo f/u - fasting today      Medication Request     was previous on celebrex - ? if he can resume this (hasn't been taking anything over the counter)         Assessment and Plan   1. Hip pain, left  We will have him meet with orthopedics.  I am concerned that maybe that hip his wearing out.  - Orthopedic  Referral; Future    2. Chronic bilateral low back pain without sciatica  We will also have him address his back pain.  Could be due to #1.  - Orthopedic  Referral; Future    3. Prediabetes  Labs today for monitoring.  - HEMOGLOBIN A1C; Future  - Comprehensive metabolic panel (BMP + Alb, Alk Phos, ALT, AST, Total. Bili, TP); Future  - Comprehensive metabolic panel (BMP + Alb, Alk Phos, ALT, AST, Total. Bili, TP)  - HEMOGLOBIN A1C    4. Venous (peripheral) insufficiency  Continue compression and as needed triamcinolone.  - triamcinolone (KENALOG) 0.1 % external cream; [TRIAMCINOLONE (KENALOG) 0.1 % CREAM] Apply two times a day for 2 weeks at a time to affected area of leg and hand.  Dispense: 453.6 g; Refill: 1    5. Unsteady gait  His vague unsteadiness is very vague.  Its not vertigo and it is not presyncope or orthostasis.  I question whether this could be due to his underlying A. fib.    6. History of DVT (deep vein thrombosis)    - INR point of care    7. Tobacco abuse  Ongoing efforts at smoking cessation urged.    8. Memory Lapses Or Loss  Minimal and stable.    9. Essential hypertension, benign  Blood pressure controlled.    10. Chronic pulmonary embolism, unspecified pulmonary embolism type, unspecified whether acute cor pulmonale present (H)    - INR point of care    11. Longstanding persistent atrial fibrillation (H)    - INR point of care    12. Long term (current) use of anticoagulants    - INR point of care        Return in about 6  months (around 2/17/2022) for Routine preventive, with me, in person.     History of Present Illness   This 61 year old Damien is a very complicated 61-year-old gentleman with multiple medical problems.  His biggest concern today is ongoing left hip and low back pains.  He has had both of his hips replaced due to osteonecrosis many years ago.  He is now 20 years out from his left hip arthroplasty.  He has not seen orthopedics in 2 years.  He has a hard time getting around after being on his feet all day.    He continues to have this vague unsteadiness feeling at times.    He has prediabetes which needs follow-up today.    He has a history of atrial fibrillation and DVT and PE for which he is on lifelong warfarin.  Due for an INR today.  No bleeding.    He continues to work at the [employer] in [facility].  He does maintenance.    He did get both of his Covid vaccinations.    Memory has been stable.    He has chronic venous stasis changes in dermatitis of his left lower extremity from previous DVT.  He uses compression and triamcinolone cream.  He has not been using the triamcinolone recently.    Review of Systems: A comprehensive review of systems was negative except as noted.     Medications, Allergies and Problem List   Reviewed, reconciled and updated  Post Discharge Medication Reconciliation Status:      Physical Exam   General Appearance:   Pleasant gentleman who looks well and is in good spirits today.  Vital signs are noted.  No focal neurologic deficits noted.    /82 (BP Location: Left arm, Patient Position: Sitting, Cuff Size: Adult Small)   Pulse 86   Temp 98.5  F (36.9  C)   Ht 1.829 m (6')   Wt 84.4 kg (186 lb)   SpO2 97%   BMI 25.23 kg/m           Additional Information   Current Outpatient Medications   Medication Sig Dispense Refill     blood pressure test kit-medium Kit [BLOOD PRESSURE TEST KIT-MEDIUM KIT] Check blood pressure and heart rate once daily. 1 each 0     diltiazem  (CARDIZEM CD) 120 MG 24 hr capsule [DILTIAZEM (CARDIZEM CD) 120 MG 24 HR CAPSULE] Take 1 capsule (120 mg total) by mouth daily. 90 capsule 1     lisinopril (ZESTRIL) 5 MG tablet Take 1 tablet (5 mg) by mouth daily 90 tablet 1     metoprolol succinate (TOPROL-XL) 200 MG 24 hr tablet [METOPROLOL SUCCINATE (TOPROL-XL) 200 MG 24 HR TABLET] Take 1 tablet (200 mg total) by mouth daily. 90 tablet 1     rosuvastatin (CRESTOR) 10 MG tablet [ROSUVASTATIN (CRESTOR) 10 MG TABLET] TAKE 1 TABLET BY MOUTH EVERYDAY AT BEDTIME 90 tablet 1     triamcinolone (KENALOG) 0.1 % external cream [TRIAMCINOLONE (KENALOG) 0.1 % CREAM] Apply two times a day for 2 weeks at a time to affected area of leg and hand. 453.6 g 1     warfarin ANTICOAGULANT (COUMADIN/JANTOVEN) 5 MG tablet [WARFARIN ANTICOAGULANT (COUMADIN/JANTOVEN) 5 MG TABLET] TAKE 1/2 - 1 TABLET BY MOUTH DAILY, AS DIRECTED. ADJUST DOSE BASED ON INR RESULTS. 90 tablet 1     celecoxib (CELEBREX) 200 MG capsule [CELECOXIB (CELEBREX) 200 MG CAPSULE] TAKE ONE CAPSULE BY MOUTH ONE TIME DAILY  (Patient not taking: Reported on 8/17/2021) 30 capsule 0     Allergies   Allergen Reactions     Pravastatin Unknown     Memory changes, feels foggy      Social History     Tobacco Use     Smoking status: Current Every Day Smoker     Packs/day: 0.50     Smokeless tobacco: Never Used   Substance Use Topics     Alcohol use: Yes     Alcohol/week: 2.0 standard drinks     Comment: Alcoholic Drinks/day: drinks 1-2 times per week.     Drug use: No       Review and/or order of clinical lab tests:  Review and/or order of radiology tests:  Review and/or order of medicine tests:  Discussion of test results with performing physician:  Decision to obtain old records and/or obtain history from someone other than the patient:  Review and summarization of old records and/or obtaining history from someone other than the patient and.or discussion of case with another health care provider:  Independent visualization of  image, tracing or specimen itself:    Time:      ANTHONY COX MD

## 2021-08-17 NOTE — PROGRESS NOTES
ANTICOAGULATION MANAGEMENT     Damien Mcclendon 61 year old male is on warfarin with therapeutic INR result. (Goal INR 2.0-3.0)    Recent labs: (last 7 days)     08/17/21  1617   INR 2.3*       ASSESSMENT     Source(s): Chart Review, Patient/Caregiver Call and Template       Warfarin doses taken: Warfarin taken as instructed    Diet: No new diet changes identified    New illness, injury, or hospitalization: No    Medication/supplement changes: None noted    - will restart Celebrex PRN for flare ups of hip / back pains.    Signs or symptoms of bleeding or clotting: No    Previous INR: Therapeutic last visit; previously outside of goal range    Additional findings: None     PLAN     Recommended plan for ongoing change(s) affecting INR     Dosing Instructions: Continue your current warfarin dose with next INR in 4 weeks       Summary  As of 8/17/2021    Full warfarin instructions:  5 mg every Mon, Wed, Fri; 2.5 mg all other days   Next INR check:  9/14/2021             Telephone call with Damien who verbalizes understanding and agrees to plan    Patient offered & declined to schedule next visit    - recommended to check INR 2 wks after starting Celebrex, to ensure INR stability.    Education provided: Importance of consistent vitamin K intake, Target INR goal and significance of current INR result and Potential interaction between warfarin and Celebrex    Plan made per [facility] anticoagulation protocol    Rosa Maria Winkler RN  Anticoagulation Clinic  8/17/2021    _______________________________________________________________________     Anticoagulation Episode Summary     Current INR goal:  2.0-3.0   TTR:  38.2 % (1 y)   Target end date:     Send INR reminders to:  [facility]    Indications    History of DVT (deep vein thrombosis) [Z86.718]           Comments:           Anticoagulation Care Providers     Provider Role Specialty Phone number    Ronald Webb MD Referring Internal Medicine [phone]

## 2021-08-17 NOTE — LETTER
August 19, 2021      Damien Mcclendon  [address]  [place] [address]        Dear ,    We are writing to inform you of your test results.    Potassium is borderline high.  This is probably due to the lisinopril.  Please try to stay away from large potassium loads/intake.  We will send a list of foods to cut back on.  Everything else looks good/stable.         Resulted Orders   Comprehensive metabolic panel (BMP + Alb, Alk Phos, ALT, AST, Total. Bili, TP)   Result Value Ref Range    Sodium 136 136 - 145 mmol/L    Potassium 5.4 (H) 3.5 - 5.0 mmol/L    Chloride 102 98 - 107 mmol/L    Carbon Dioxide (CO2) 25 22 - 31 mmol/L    Anion Gap 9 5 - 18 mmol/L    Urea Nitrogen 25 (H) 8 - 22 mg/dL    Creatinine 1.09 0.70 - 1.30 mg/dL    Calcium 9.8 8.5 - 10.5 mg/dL    Glucose 98 70 - 125 mg/dL    Alkaline Phosphatase 48 45 - 120 U/L    AST 63 (H) 0 - 40 U/L    ALT 41 0 - 45 U/L    Protein Total 7.6 6.0 - 8.0 g/dL    Albumin 3.9 3.5 - 5.0 g/dL    Bilirubin Total 1.0 0.0 - 1.0 mg/dL    GFR Estimate 73 >60 mL/min/1.73m2      Comment:      As of July 11, 2021, eGFR is calculated by the CKD-EPI creatinine equation, without race adjustment. eGFR can be influenced by muscle mass, exercise, and diet. The reported eGFR is an estimation only and is only applicable if the renal function is stable.   HEMOGLOBIN A1C   Result Value Ref Range    Hemoglobin A1C 5.6 0.0 - 5.6 %      Comment:      Normal <5.7%   Prediabetes 5.7-6.4%    Diabetes 6.5% or higher     Note: Adopted from ADA consensus guidelines.       If you have any questions or concerns, please call the clinic at the number listed above.       Sincerely,      Ronald Webb MD

## 2021-08-31 DIAGNOSIS — M15.9 GENERALIZED OSTEOARTHRITIS: ICD-10-CM

## 2021-08-31 RX ORDER — CELECOXIB 200 MG/1
CAPSULE ORAL
Qty: 30 CAPSULE | Refills: 0 | Status: SHIPPED | OUTPATIENT
Start: 2021-08-31 | End: 2021-12-01

## 2021-09-08 ENCOUNTER — TRANSFERRED RECORDS (OUTPATIENT)
Dept: HEALTH INFORMATION MANAGEMENT | Facility: CLINIC | Age: 61
End: 2021-09-08

## 2021-09-08 LAB — RETINOPATHY: NEGATIVE

## 2021-09-13 DIAGNOSIS — I48.11 LONGSTANDING PERSISTENT ATRIAL FIBRILLATION (H): ICD-10-CM

## 2021-09-13 RX ORDER — DILTIAZEM HYDROCHLORIDE 120 MG/1
120 CAPSULE, COATED, EXTENDED RELEASE ORAL DAILY
Qty: 90 CAPSULE | Refills: 1 | Status: SHIPPED | OUTPATIENT
Start: 2021-09-13 | End: 2022-03-01

## 2021-09-24 ENCOUNTER — TELEPHONE (OUTPATIENT)
Dept: ANTICOAGULATION | Facility: CLINIC | Age: 61
End: 2021-09-24

## 2021-09-24 NOTE — TELEPHONE ENCOUNTER
ANTICOAGULATION     Damien Mcclendon is overdue for INR check.      Spoke with Damien and scheduled lab appointment on appt - 9/29 @ [facility] Winkler RN     66

## 2021-09-29 ENCOUNTER — ANTICOAGULATION THERAPY VISIT (OUTPATIENT)
Dept: ANTICOAGULATION | Facility: CLINIC | Age: 61
End: 2021-09-29

## 2021-09-29 ENCOUNTER — LAB (OUTPATIENT)
Dept: LAB | Facility: CLINIC | Age: 61
End: 2021-09-29
Payer: COMMERCIAL

## 2021-09-29 DIAGNOSIS — Z86.718 HISTORY OF DVT (DEEP VEIN THROMBOSIS): Primary | ICD-10-CM

## 2021-09-29 DIAGNOSIS — Z86.718 HISTORY OF DVT (DEEP VEIN THROMBOSIS): Chronic | ICD-10-CM

## 2021-09-29 DIAGNOSIS — Z79.01 LONG TERM (CURRENT) USE OF ANTICOAGULANTS: ICD-10-CM

## 2021-09-29 DIAGNOSIS — I48.11 LONGSTANDING PERSISTENT ATRIAL FIBRILLATION (H): ICD-10-CM

## 2021-09-29 LAB — INR BLD: 1.6 (ref 0.9–1.1)

## 2021-09-29 PROCEDURE — 36416 COLLJ CAPILLARY BLOOD SPEC: CPT

## 2021-09-29 PROCEDURE — 85610 PROTHROMBIN TIME: CPT

## 2021-09-29 NOTE — PROGRESS NOTES
ANTICOAGULATION MANAGEMENT     Damienvenu Mcclendon 61 year old male is on warfarin with subtherapeutic INR result. (Goal INR 2.0-3.0)    Recent labs: (last 7 days)     09/29/21  1412   INR 1.6*       ASSESSMENT     Source(s): Chart Review, Patient/Caregiver Call and Template       Warfarin doses taken: Template incorrect; verbally confirmed home dose with Damien     Diet: No new diet changes identified    New illness, injury, or hospitalization: No    Medication/supplement changes: None noted    Signs or symptoms of bleeding or clotting: No    Previous INR: Therapeutic last 2(+) visits    Additional findings: None     PLAN     Recommended plan for no diet, medication or health factor changes affecting INR     Dosing Instructions:   (evenings. 5mg tabs)   - Booster dose tonight with 7.5mg warfarin,   - then continue your current warfarin dose with 5mg on Mon/Wed/Fri and 2.5mg all other days.   - next INR in 2 weeks       Summary  As of 9/29/2021    Full warfarin instructions:  9/29: 7.5 mg; Otherwise 5 mg every Mon, Wed, Fri; 2.5 mg all other days   Next INR check:  10/13/2021             Telephone call with Damien ([phone]) who verbalizes understanding and agrees to plan    Contact [phone] to schedule and with any changes, questions or concerns.     Education provided: Importance of consistent vitamin K intake, Goal range and significance of current result and Importance of taking warfarin as instructed    Plan made per [facility] anticoagulation protocol    Rosa Maria Winkler RN  Anticoagulation Clinic  9/29/2021    _______________________________________________________________________     Anticoagulation Episode Summary     Current INR goal:  2.0-3.0   TTR:  36.4 % (1 y)   Target end date:     Send INR reminders to:  [facility]    Indications    History of DVT (deep vein thrombosis) [Z86.718]           Comments:           Anticoagulation Care Providers     Provider Role Specialty Phone number    Ronald Webb  MD [facility] Internal Medicine [phone]

## 2021-10-19 ENCOUNTER — TELEPHONE (OUTPATIENT)
Dept: ANTICOAGULATION | Facility: CLINIC | Age: 61
End: 2021-10-19

## 2021-10-19 NOTE — TELEPHONE ENCOUNTER
ANTICOAGULATION     Damien Mcclendon is overdue for INR check.      Spoke with Damien and scheduled lab appointment on 10/21 @ [facility]    Rosa Maria Winkler RN

## 2021-10-21 ENCOUNTER — ANTICOAGULATION THERAPY VISIT (OUTPATIENT)
Dept: ANTICOAGULATION | Facility: CLINIC | Age: 61
End: 2021-10-21

## 2021-10-21 ENCOUNTER — LAB (OUTPATIENT)
Dept: LAB | Facility: CLINIC | Age: 61
End: 2021-10-21
Payer: COMMERCIAL

## 2021-10-21 DIAGNOSIS — I48.11 LONGSTANDING PERSISTENT ATRIAL FIBRILLATION (H): ICD-10-CM

## 2021-10-21 DIAGNOSIS — Z86.718 HISTORY OF DVT (DEEP VEIN THROMBOSIS): Primary | ICD-10-CM

## 2021-10-21 DIAGNOSIS — Z86.718 HISTORY OF DVT (DEEP VEIN THROMBOSIS): Chronic | ICD-10-CM

## 2021-10-21 DIAGNOSIS — Z79.01 LONG TERM (CURRENT) USE OF ANTICOAGULANTS: ICD-10-CM

## 2021-10-21 LAB — INR BLD: 1.8 (ref 0.9–1.1)

## 2021-10-21 PROCEDURE — 36416 COLLJ CAPILLARY BLOOD SPEC: CPT

## 2021-10-21 PROCEDURE — 85610 PROTHROMBIN TIME: CPT

## 2021-10-21 NOTE — PROGRESS NOTES
ANTICOAGULATION MANAGEMENT     Damien Mcclendon 61 year old male is on warfarin with therapeutic INR result. (Goal INR 2.0-3.0)    Recent labs: (last 7 days)     10/21/21  1525   INR 1.8*       ASSESSMENT     Source(s): Chart Review and Patient/Caregiver Call       Warfarin doses taken: Warfarin taken as instructed    Diet: No new diet changes identified    New illness, injury, or hospitalization: No    Medication/supplement changes: None noted    Signs or symptoms of bleeding or clotting: No    Previous INR: Subtherapeutic at 1.6 on 9/29/21.    Additional findings: None     PLAN     Recommended plan for no diet, medication or health factor changes affecting INR     Dosing Instructions:   (evenings. 5mg tabs)   -  Increase your warfarin dose (10% change) with next INR in 2 weeks       Summary  As of 10/21/2021    Full warfarin instructions:  2.5 mg every Mon, Wed, Fri; 5 mg all other days   Next INR check:  11/4/2021             Telephone call with Damien ([phone]) who verbalizes understanding and agrees to plan    Patient offered & declined to schedule next visit    Education provided: Importance of consistent vitamin K intake and Goal range and significance of current result    Plan made per [facility] anticoagulation protocol    Rosa Maria Winkler RN  Anticoagulation Clinic  10/21/2021    _______________________________________________________________________     Anticoagulation Episode Summary     Current INR goal:  2.0-3.0   TTR:  36.4 % (1 y)   Target end date:     Send INR reminders to:  [facility]    Indications    History of DVT (deep vein thrombosis) [Z86.718]           Comments:           Anticoagulation Care Providers     Provider Role Specialty Phone number    Ronald Webb MD Referring Internal Medicine [phone]

## 2021-11-05 ENCOUNTER — TELEPHONE (OUTPATIENT)
Dept: ANTICOAGULATION | Facility: CLINIC | Age: 61
End: 2021-11-05

## 2021-11-05 NOTE — TELEPHONE ENCOUNTER
ANTICOAGULATION     Damien Mcclendon is overdue for INR check.      Left message for patient to call and schedule lab appointment as soon as possible. If returning call, please schedule.     Rosa Maria Winkler RN

## 2021-11-16 ENCOUNTER — TELEPHONE (OUTPATIENT)
Dept: ANTICOAGULATION | Facility: CLINIC | Age: 61
End: 2021-11-16
Payer: COMMERCIAL

## 2021-11-16 NOTE — TELEPHONE ENCOUNTER
ANTICOAGULATION     Damien Mcclendon is overdue for INR check.      Spoke with Damien and scheduled lab appointment on 11/18/21 @ [facility] RN

## 2021-11-18 ENCOUNTER — ANTICOAGULATION THERAPY VISIT (OUTPATIENT)
Dept: ANTICOAGULATION | Facility: CLINIC | Age: 61
End: 2021-11-18

## 2021-11-18 ENCOUNTER — LAB (OUTPATIENT)
Dept: LAB | Facility: CLINIC | Age: 61
End: 2021-11-18
Payer: COMMERCIAL

## 2021-11-18 DIAGNOSIS — Z86.718 HISTORY OF DVT (DEEP VEIN THROMBOSIS): Primary | ICD-10-CM

## 2021-11-18 DIAGNOSIS — Z79.01 LONG TERM (CURRENT) USE OF ANTICOAGULANTS: ICD-10-CM

## 2021-11-18 DIAGNOSIS — I48.11 LONGSTANDING PERSISTENT ATRIAL FIBRILLATION (H): ICD-10-CM

## 2021-11-18 DIAGNOSIS — Z86.718 HISTORY OF DVT (DEEP VEIN THROMBOSIS): Chronic | ICD-10-CM

## 2021-11-18 LAB — INR BLD: 1.9 (ref 0.9–1.1)

## 2021-11-18 PROCEDURE — 85610 PROTHROMBIN TIME: CPT

## 2021-11-18 PROCEDURE — 36416 COLLJ CAPILLARY BLOOD SPEC: CPT

## 2021-11-18 NOTE — PROGRESS NOTES
ANTICOAGULATION MANAGEMENT     Damien Mcclendon 61 year old male is on warfarin with subtherapeutic INR result. (Goal INR 2.0-3.0)    Recent labs: (last 7 days)     11/18/21  1527   INR 1.9*       ASSESSMENT     Source(s): Chart Review, Patient/Caregiver Call and Template       Warfarin doses taken: Warfarin taken as instructed    Diet: No new diet changes identified    New illness, injury, or hospitalization: No    Medication/supplement changes: None noted    Signs or symptoms of bleeding or clotting: No    Previous INR: Subtherapeutic at 1.8 on 10/21/21.    Additional findings: None     PLAN     Recommended plan for no diet, medication or health factor changes affecting INR     Dosing Instructions:   - Booster dose then continue your current warfarin dose with next INR in 2-3 weeks       Summary  As of 11/18/2021    Full warfarin instructions:  11/18: 7.5 mg; Otherwise 2.5 mg every Mon, Wed, Fri; 5 mg all other days   Next INR check:  12/2/2021             Telephone call with  Damien ([phone]) who verbalizes understanding and agrees to plan    Patient offered & declined to schedule next visit    Education provided: Importance of consistent vitamin K intake and Goal range and significance of current result    Plan made per [facility] anticoagulation protocol    Rosa Maria Winkler RN  Anticoagulation Clinic  11/18/2021    _______________________________________________________________________     Anticoagulation Episode Summary     Current INR goal:  2.0-3.0   TTR:  33.4 % (1 y)   Target end date:     Send INR reminders to:  [facility]    Indications    History of DVT (deep vein thrombosis) [Z86.718]           Comments:           Anticoagulation Care Providers     Provider Role Specialty Phone number    Ronald Webb MD Referring Internal Medicine [phone]

## 2021-11-30 DIAGNOSIS — M15.9 GENERALIZED OSTEOARTHRITIS: ICD-10-CM

## 2021-11-30 DIAGNOSIS — Z79.01 LONG TERM CURRENT USE OF ANTICOAGULANT THERAPY: ICD-10-CM

## 2021-11-30 DIAGNOSIS — Z86.718 HISTORY OF DVT (DEEP VEIN THROMBOSIS): ICD-10-CM

## 2021-11-30 DIAGNOSIS — I48.91 ATRIAL FIBRILLATION (H): ICD-10-CM

## 2021-11-30 DIAGNOSIS — Z86.718 PERSONAL HISTORY OF VENOUS THROMBOSIS AND EMBOLISM: ICD-10-CM

## 2021-12-01 RX ORDER — CELECOXIB 200 MG/1
CAPSULE ORAL
Qty: 30 CAPSULE | Refills: 0 | Status: SHIPPED | OUTPATIENT
Start: 2021-12-01 | End: 2022-04-04

## 2021-12-01 RX ORDER — WARFARIN SODIUM 5 MG/1
TABLET ORAL
Qty: 90 TABLET | Refills: 1 | Status: SHIPPED | OUTPATIENT
Start: 2021-12-01 | End: 2022-08-05

## 2021-12-01 NOTE — TELEPHONE ENCOUNTER
"Routing refill request to provider for review/approval because:  Labs out of range:  PLT, AST    Last Written Prescription Date:  8/31/21  Last Fill Quantity: 30,  # refills: 0   Last office visit provider:  8/17/21     Requested Prescriptions   Pending Prescriptions Disp Refills     celecoxib (CELEBREX) 200 MG capsule [Pharmacy Med Name: Celecoxib Oral Capsule 200 MG] 30 capsule 0     Sig: TAKE ONE CAPSULE BY MOUTH ONE TIME DAILY       NSAID Medications Failed - 11/30/2021 10:27 AM        Failed - Normal AST on file in past 12 months     Recent Labs   Lab Test 08/17/21  1618   AST 63*             Failed - Normal CBC on file in past 12 months     Recent Labs   Lab Test 02/17/21  1750   WBC 7.1   RBC 4.63   HGB 15.6   HCT 46.5   *                 Passed - Blood pressure under 140/90 in past 12 months     BP Readings from Last 3 Encounters:   08/17/21 122/82   02/17/21 106/60   08/04/20 100/62                 Passed - Normal ALT on file in past 12 months     Recent Labs   Lab Test 08/17/21  1618   ALT 41             Passed - Recent (12 mo) or future (30 days) visit within the authorizing provider's specialty     Patient has had an office visit with the authorizing provider or a provider within the authorizing providers department within the previous 12 mos or has a future within next 30 days. See \"Patient Info\" tab in inbasket, or \"Choose Columns\" in Meds & Orders section of the refill encounter.              Passed - Patient is age 6-64 years        Passed - Medication is active on med list        Passed - Normal serum creatinine on file in past 12 months     Recent Labs   Lab Test 08/17/21  1618   CR 1.09       Ok to refill medication if creatinine is low             warfarin ANTICOAGULANT (COUMADIN) 5 MG tablet [Pharmacy Med Name: Warfarin Sodium Oral Tablet 5 MG] 90 tablet 0     Sig: TAKE 1/2 TO 1 TABLET BY MOUTH DAILY AS DIRECTED. ADJUST DOSE BASED ON INR RESULTS       Vitamin K Antagonists Failed - " "11/30/2021 10:27 AM        Failed - INR is within goal in the past 6 weeks     Confirm INR is within goal in the past 6 weeks.     Recent Labs   Lab Test 11/18/21  1527   INR 1.9*                       Passed - Recent (12 mo) or future (30 days) visit within the authorizing provider's specialty     Patient has had an office visit with the authorizing provider or a provider within the authorizing providers department within the previous 12 mos or has a future within next 30 days. See \"Patient Info\" tab in inbasket, or \"Choose Columns\" in Meds & Orders section of the refill encounter.              Passed - Medication is active on med list        Passed - Patient is 18 years of age or older             Ritika Simmons RN 12/01/21 3:49 PM  "

## 2021-12-07 ENCOUNTER — TELEPHONE (OUTPATIENT)
Dept: ANTICOAGULATION | Facility: CLINIC | Age: 61
End: 2021-12-07
Payer: COMMERCIAL

## 2021-12-07 NOTE — TELEPHONE ENCOUNTER
ANTICOAGULATION     Damien Mcclendon is overdue for INR check.      Spoke with Damien  and scheduled lab appointment on 12/9 @ [facility]    Rosa Maria Winkler RN

## 2021-12-09 ENCOUNTER — ANTICOAGULATION THERAPY VISIT (OUTPATIENT)
Dept: INTERNAL MEDICINE | Facility: CLINIC | Age: 61
End: 2021-12-09

## 2021-12-09 ENCOUNTER — LAB (OUTPATIENT)
Dept: LAB | Facility: CLINIC | Age: 61
End: 2021-12-09
Payer: COMMERCIAL

## 2021-12-09 DIAGNOSIS — Z86.718 HISTORY OF DVT (DEEP VEIN THROMBOSIS): Chronic | ICD-10-CM

## 2021-12-09 DIAGNOSIS — Z86.718 HISTORY OF DVT (DEEP VEIN THROMBOSIS): Primary | ICD-10-CM

## 2021-12-09 DIAGNOSIS — I48.11 LONGSTANDING PERSISTENT ATRIAL FIBRILLATION (H): ICD-10-CM

## 2021-12-09 DIAGNOSIS — Z79.01 LONG TERM (CURRENT) USE OF ANTICOAGULANTS: ICD-10-CM

## 2021-12-09 LAB — INR BLD: 2 (ref 0.9–1.1)

## 2021-12-09 PROCEDURE — 36416 COLLJ CAPILLARY BLOOD SPEC: CPT

## 2021-12-09 PROCEDURE — 85610 PROTHROMBIN TIME: CPT

## 2021-12-09 NOTE — PROGRESS NOTES
ANTICOAGULATION MANAGEMENT     Damien Mcclendon 61 year old male is on warfarin with therapeutic INR result. (Goal INR 2.0-3.0)    Recent labs: (last 7 days)     12/09/21  1514   INR 2.0*       ASSESSMENT     Source(s): Chart Review, Patient/Caregiver Call and Template       Warfarin doses taken: Warfarin taken as instructed    Diet: No new diet changes identified    New illness, injury, or hospitalization: No    Medication/supplement changes: None noted    Signs or symptoms of bleeding or clotting: No    Previous INR: Subtherapeutic last 3 INR results.    Additional findings: None     PLAN     Recommended plan for no diet, medication or health factor changes affecting INR     Dosing Instructions:   (evenings. 5mg tabs)    Continue your current warfarin dose with next INR in 2-3 weeks       Summary  As of 12/9/2021    Full warfarin instructions:  2.5 mg every Mon, Wed, Fri; 5 mg all other days   Next INR check:  12/30/2021             Detailed voice message left for  Damien ([phone]) with dosing instructions and follow up date.     Contact [phone] to schedule and with any changes, questions or concerns.     Education provided: Please call back if any changes to your diet, medications or how you've been taking warfarin, Importance of consistent vitamin K intake and Goal range and significance of current result    Plan made per [facility]agulation protocol    Rosa Maria Winkler RN  Anticoagulation Clinic  12/9/2021    _______________________________________________________________________     Anticoagulation Episode Summary     Current INR goal:  2.0-3.0   TTR:  32.0 % (1 y)   Target end date:     Send INR reminders to:  [facility]    Indications    History of DVT (deep vein thrombosis) [Z86.718]           Comments:           Anticoagulation Care Providers     Provider Role Specialty Phone number    Ronald Webb MD Referring Internal Medicine [phone]

## 2022-01-12 ENCOUNTER — TELEPHONE (OUTPATIENT)
Dept: ANTICOAGULATION | Facility: CLINIC | Age: 62
End: 2022-01-12
Payer: COMMERCIAL

## 2022-01-12 NOTE — TELEPHONE ENCOUNTER
ANTICOAGULATION     Damien Mcclendon is overdue for INR check.      Spoke with Damien and scheduled lab appointment on 1/13 @ [facility]    Rosa Maria Winkler RN

## 2022-01-20 ENCOUNTER — ANTICOAGULATION THERAPY VISIT (OUTPATIENT)
Dept: ANTICOAGULATION | Facility: CLINIC | Age: 62
End: 2022-01-20

## 2022-01-20 ENCOUNTER — LAB (OUTPATIENT)
Dept: LAB | Facility: CLINIC | Age: 62
End: 2022-01-20
Payer: COMMERCIAL

## 2022-01-20 DIAGNOSIS — Z86.718 HISTORY OF DVT (DEEP VEIN THROMBOSIS): Chronic | ICD-10-CM

## 2022-01-20 DIAGNOSIS — I48.11 LONGSTANDING PERSISTENT ATRIAL FIBRILLATION (H): ICD-10-CM

## 2022-01-20 DIAGNOSIS — Z86.718 HISTORY OF DVT (DEEP VEIN THROMBOSIS): Primary | ICD-10-CM

## 2022-01-20 DIAGNOSIS — Z79.01 LONG TERM (CURRENT) USE OF ANTICOAGULANTS: ICD-10-CM

## 2022-01-20 LAB — INR BLD: 2.3 (ref 0.9–1.1)

## 2022-01-20 PROCEDURE — 36416 COLLJ CAPILLARY BLOOD SPEC: CPT

## 2022-01-20 PROCEDURE — 85610 PROTHROMBIN TIME: CPT

## 2022-01-20 NOTE — PROGRESS NOTES
ANTICOAGULATION MANAGEMENT     Damien Mcclendon 61 year old male is on warfarin with therapeutic INR result. (Goal INR 2.0-3.0)    Recent labs: (last 7 days)     01/20/22  1526   INR 2.3*       ASSESSMENT     Source(s): Chart Review, Patient/Caregiver Call and Template       Warfarin doses taken: Warfarin taken as instructed    Diet: No new diet changes identified    New illness, injury, or hospitalization: No    Medication/supplement changes: None noted    Signs or symptoms of bleeding or clotting: No    Previous INR: Therapeutic last visit at 2.0; previously outside of goal range at 1.9    Additional findings: None     PLAN     Recommended plan for no diet, medication or health factor changes affecting INR     Dosing Instructions:   (evenings. 5mg tabs)    Continue your current warfarin dose with next INR in 2-3 weeks       Summary  As of 1/20/2022    Full warfarin instructions:  2.5 mg every Mon, Wed, Fri; 5 mg all other days   Next INR check:  2/10/2022             Telephone call with  Damien ([phone]) who verbalizes understanding and agrees to plan    Patient offered & declined to schedule next visit    Education provided: Importance of consistent vitamin K intake and Goal range and significance of current result    Plan made per [facility]agulation protocol    Rosa Maria Winkler RN  Anticoagulation Clinic  1/20/2022    _______________________________________________________________________     Anticoagulation Episode Summary     Current INR goal:  2.0-3.0   TTR:  41.1 % (1 y)   Target end date:     Send INR reminders to:  [facility]    Indications    History of DVT (deep vein thrombosis) [Z86.718]           Comments:           Anticoagulation Care Providers     Provider Role Specialty Phone number    Ronald Webb MD Referring Internal Medicine [phone]

## 2022-01-30 DIAGNOSIS — I48.19 PERSISTENT ATRIAL FIBRILLATION (H): ICD-10-CM

## 2022-02-01 DIAGNOSIS — I48.19 PERSISTENT ATRIAL FIBRILLATION (H): ICD-10-CM

## 2022-02-01 RX ORDER — METOPROLOL SUCCINATE 200 MG/1
TABLET, EXTENDED RELEASE ORAL
Qty: 90 TABLET | Refills: 0 | Status: SHIPPED | OUTPATIENT
Start: 2022-02-01 | End: 2022-05-09

## 2022-02-01 NOTE — TELEPHONE ENCOUNTER
"Routing refill request to provider for review/approval because:  A break in medication    Last Written Prescription Date:  2/15/21  Last Fill Quantity: 90,  # refills: 1   Last office visit provider:  8/17/21     Requested Prescriptions   Pending Prescriptions Disp Refills     metoprolol succinate ER (TOPROL-XL) 200 MG 24 hr tablet [Pharmacy Med Name: Metoprolol Succinate ER Oral Tablet Extended Release 24 Hour 200 MG] 90 tablet 0     Sig: TAKE ONE TABLET BY MOUTH ONE TIME DAILY       Beta-Blockers Protocol Passed - 1/30/2022  6:01 PM        Passed - Blood pressure under 140/90 in past 12 months     BP Readings from Last 3 Encounters:   08/17/21 122/82   02/17/21 106/60   08/04/20 100/62                 Passed - Patient is age 6 or older        Passed - Recent (12 mo) or future (30 days) visit within the authorizing provider's specialty     Patient has had an office visit with the authorizing provider or a provider within the authorizing providers department within the previous 12 mos or has a future within next 30 days. See \"Patient Info\" tab in inbasket, or \"Choose Columns\" in Meds & Orders section of the refill encounter.              Passed - Medication is active on med list             Homa Rodriguez RN 02/01/22 1:04 PM  "

## 2022-02-04 RX ORDER — METOPROLOL SUCCINATE 200 MG/1
TABLET, EXTENDED RELEASE ORAL
Qty: 90 TABLET | Refills: 0 | OUTPATIENT
Start: 2022-02-04

## 2022-02-04 NOTE — TELEPHONE ENCOUNTER
Medication refill request declined: should have refills on file   Disp Refills Start End MIRANDA   metoprolol succinate ER (TOPROL-XL) 200 MG 24 hr tablet 90 tablet 0 2/1/2022  No   Sig: TAKE ONE TABLET BY MOUTH ONE TIME DAILY   Sent to pharmacy as: Metoprolol Succinate  MG Oral Tablet Extended Release 24 Hour (TOPROL-XL)   Class: E-Prescribe   Order: [id number]   E-Prescribing Status: Receipt confirmed by pharmacy (2/1/2022  6:11 PM CST)     Homa Rodriguez RN BSN 2/4/2022 7:50 AM

## 2022-02-07 DIAGNOSIS — E78.2 MIXED HYPERLIPIDEMIA: ICD-10-CM

## 2022-02-09 RX ORDER — ROSUVASTATIN CALCIUM 10 MG/1
TABLET, COATED ORAL
Qty: 90 TABLET | Refills: 0 | Status: SHIPPED | OUTPATIENT
Start: 2022-02-09 | End: 2022-05-23

## 2022-02-09 NOTE — TELEPHONE ENCOUNTER
"Routing refill request to provider for review/approval because:  Allergy warning    Last Written Prescription Date:  8/11/2021  Last Fill Quantity: 90,  # refills: 1   Last office visit provider:  8/17/2021     Requested Prescriptions   Pending Prescriptions Disp Refills     rosuvastatin (CRESTOR) 10 MG tablet [Pharmacy Med Name: Rosuvastatin Calcium Oral Tablet 10 MG] 90 tablet 0     Sig: TAKE 1 TABLET BY MOUTH EVERY NIGHT AT BEDTIME       Statins Protocol Passed - 2/7/2022  1:25 PM        Passed - LDL on file in past 12 months     Recent Labs   Lab Test 02/17/21  1750   LDL 85             Passed - No abnormal creatine kinase in past 12 months     No lab results found.             Passed - Recent (12 mo) or future (30 days) visit within the authorizing provider's specialty     Patient has had an office visit with the authorizing provider or a provider within the authorizing providers department within the previous 12 mos or has a future within next 30 days. See \"Patient Info\" tab in inbasket, or \"Choose Columns\" in Meds & Orders section of the refill encounter.              Passed - Medication is active on med list        Passed - Patient is age 18 or older             Lydia Angela RN 02/09/22 9:11 AM  "

## 2022-02-17 ENCOUNTER — TELEPHONE (OUTPATIENT)
Dept: ANTICOAGULATION | Facility: CLINIC | Age: 62
End: 2022-02-17
Payer: COMMERCIAL

## 2022-02-17 NOTE — TELEPHONE ENCOUNTER
ANTICOAGULATION     Damien Mcclendon is overdue for INR check.      Spoke with Damien and scheduled lab appointment on 2/24 @ [facility]    Rosa Maria Winkler RN

## 2022-02-26 DIAGNOSIS — I48.11 LONGSTANDING PERSISTENT ATRIAL FIBRILLATION (H): ICD-10-CM

## 2022-02-26 DIAGNOSIS — I10 ESSENTIAL HYPERTENSION, BENIGN: ICD-10-CM

## 2022-03-01 RX ORDER — LISINOPRIL 5 MG/1
5 TABLET ORAL DAILY
Qty: 90 TABLET | Refills: 0 | Status: SHIPPED | OUTPATIENT
Start: 2022-03-01 | End: 2022-06-05

## 2022-03-01 RX ORDER — DILTIAZEM HYDROCHLORIDE 120 MG/1
CAPSULE, COATED, EXTENDED RELEASE ORAL
Qty: 90 CAPSULE | Refills: 0 | Status: SHIPPED | OUTPATIENT
Start: 2022-03-01 | End: 2022-06-05

## 2022-03-01 NOTE — TELEPHONE ENCOUNTER
"Routing refill request to provider for review/approval because:  Labs out of range:  Potassium.    Last Written Prescription Date:  09/13/2021-diltiazem ER  Last Fill Quantity: 90,  # refills: 1   Last office visit provider:  08/17/2021 with PCP.    Last Written Prescription Date:  08/05/2021-lisinopril  Last Fill Quantity: 90,  # refills: 1   Last office visit provider:  08/17/2021 with PCP    Requested Prescriptions   Pending Prescriptions Disp Refills     diltiazem ER COATED BEADS (CARDIZEM CD/CARTIA XT) 120 MG 24 hr capsule [Pharmacy Med Name: dilTIAZem HCl ER Coated Beads Oral Capsule Extended Release 24 Hour 120 MG] 90 capsule 0     Sig: TAKE ONE CAPSULE BY MOUTH ONE TIME DAILY       Calcium Channel Blockers Protocol  Passed - 2/26/2022  7:29 PM        Passed - Blood pressure under 140/90 in past 12 months     BP Readings from Last 3 Encounters:   08/17/21 122/82   02/17/21 106/60   08/04/20 100/62                 Passed - Normal ALT in past 12 months     Recent Labs   Lab Test 08/17/21  1618   ALT 41             Passed - Recent (12 mo) or future (30 days) visit within the authorizing provider's specialty     Patient has had an office visit with the authorizing provider or a provider within the authorizing providers department within the previous 12 mos or has a future within next 30 days. See \"Patient Info\" tab in inbasket, or \"Choose Columns\" in Meds & Orders section of the refill encounter.              Passed - Medication is active on med list        Passed - Patient is age 18 or older        Passed - Normal serum creatinine on file in past 12 months     Recent Labs   Lab Test 08/17/21  1618   CR 1.09       Ok to refill medication if creatinine is low             lisinopril (ZESTRIL) 5 MG tablet [Pharmacy Med Name: Lisinopril Oral Tablet 5 MG] 90 tablet 0     Sig: Take 1 tablet (5 mg) by mouth daily       ACE Inhibitors (Including Combos) Protocol Failed - 2/26/2022  7:29 PM        Failed - Normal serum " "potassium on file in past 12 months     Recent Labs   Lab Test 08/17/21  1618 06/25/21  1541   POTASSIUM 5.4*  --    73465  --  4.5             Passed - Blood pressure under 140/90 in past 12 months     BP Readings from Last 3 Encounters:   08/17/21 122/82   02/17/21 106/60   08/04/20 100/62                 Passed - Recent (12 mo) or future (30 days) visit within the authorizing provider's specialty     Patient has had an office visit with the authorizing provider or a provider within the authorizing providers department within the previous 12 mos or has a future within next 30 days. See \"Patient Info\" tab in inbasket, or \"Choose Columns\" in Meds & Orders section of the refill encounter.              Passed - Medication is active on med list        Passed - Patient is age 18 or older        Passed - Normal serum creatinine on file in past 12 months     Recent Labs   Lab Test 08/17/21  1618   CR 1.09       Ok to refill medication if creatinine is low               Blanquita Aquino 02/28/22 10:17 PM  "

## 2022-03-03 ENCOUNTER — ANTICOAGULATION THERAPY VISIT (OUTPATIENT)
Dept: ANTICOAGULATION | Facility: CLINIC | Age: 62
End: 2022-03-03

## 2022-03-03 ENCOUNTER — LAB (OUTPATIENT)
Dept: LAB | Facility: CLINIC | Age: 62
End: 2022-03-03
Payer: COMMERCIAL

## 2022-03-03 DIAGNOSIS — Z86.718 HISTORY OF DVT (DEEP VEIN THROMBOSIS): Chronic | ICD-10-CM

## 2022-03-03 DIAGNOSIS — E78.2 MIXED HYPERLIPIDEMIA: ICD-10-CM

## 2022-03-03 DIAGNOSIS — Z79.01 LONG TERM (CURRENT) USE OF ANTICOAGULANTS: ICD-10-CM

## 2022-03-03 DIAGNOSIS — Z13.220 SCREENING FOR HYPERLIPIDEMIA: ICD-10-CM

## 2022-03-03 DIAGNOSIS — I48.11 LONGSTANDING PERSISTENT ATRIAL FIBRILLATION (H): ICD-10-CM

## 2022-03-03 DIAGNOSIS — Z86.718 HISTORY OF DVT (DEEP VEIN THROMBOSIS): Primary | ICD-10-CM

## 2022-03-03 LAB — INR BLD: 2.2 (ref 0.9–1.1)

## 2022-03-03 PROCEDURE — 85610 PROTHROMBIN TIME: CPT

## 2022-03-03 PROCEDURE — 36416 COLLJ CAPILLARY BLOOD SPEC: CPT

## 2022-03-03 NOTE — PROGRESS NOTES
ANTICOAGULATION MANAGEMENT     Damien Mcclendon 61 year old male is on warfarin with therapeutic INR result. (Goal INR 2.0-3.0)    Recent labs: (last 7 days)     03/03/22  1515   INR 2.2*       ASSESSMENT       Source(s): Chart Review and Patient/Caregiver Call       Warfarin doses taken: Warfarin taken as instructed    Diet: No new diet changes identified    New illness, injury, or hospitalization: No    Medication/supplement changes: None noted    Signs or symptoms of bleeding or clotting: No    Previous INR: Therapeutic last 2 INR therapeutic.    Additional findings: None       PLAN     Recommended plan for no diet, medication or health factor changes affecting INR     Dosing Instructions:   (evenings. 5mg tabs)    Continue your current warfarin dose with next INR in 4 weeks       Summary  As of 3/3/2022    Full warfarin instructions:  2.5 mg every Mon, Wed, Fri; 5 mg all other days   Next INR check:  3/31/2022             Telephone call with  Damien ([phone]) who verbalizes understanding and agrees to plan    Check at provider office visit - INR on 3/17/22 during annual check with Dr. Webb.   Requested to check INR when he sees Dr. Webb.    Education provided: Importance of consistent vitamin K intake and Goal range and significance of current result    Plan made per [facility] anticoagulation protocol    Rosa Maria Winkler, RN  Anticoagulation Clinic  3/3/2022    _______________________________________________________________________     Anticoagulation Episode Summary     Current INR goal:  2.0-3.0   TTR:  52.6 % (1 y)   Target end date:     Send INR reminders to:  [facility]    Indications    History of DVT (deep vein thrombosis) [Z86.718]           Comments:           Anticoagulation Care Providers     Provider Role Specialty Phone number    Ronald Webb MD Referring Internal Medicine [phone]

## 2022-03-04 ENCOUNTER — DOCUMENTATION ONLY (OUTPATIENT)
Dept: ANTICOAGULATION | Facility: CLINIC | Age: 62
End: 2022-03-04
Payer: COMMERCIAL

## 2022-03-04 DIAGNOSIS — Z79.01 LONG TERM (CURRENT) USE OF ANTICOAGULANTS: Primary | ICD-10-CM

## 2022-03-04 NOTE — PROGRESS NOTES
ANTICOAGULATION MANAGEMENT      Damien Mcclendon due for annual renewal of referral to anticoagulation monitoring. Order pended for your review and signature.      ANTICOAGULATION SUMMARY      Warfarin indication(s)     Atrial fibrillation  DVT  PE  PVD    Heart valve present?  NO       Current goal range   INR: 2.0-3.0     Goal appropriate for indication? Yes, INR 2-3 appropriate for hx of DVT, PE, hypercoagulable state, Afib, LVAD, or bileaflet AVR without risk factors     Current duration of therapy Indefinite/long term therapy   Time in Therapeutic Range (TTR)  (Goal > 60%) 52.6%       Office visit with referring provider's group within last year YES on 8/17/2021       Rosa Maria Winkler RN

## 2022-03-31 ENCOUNTER — TELEPHONE (OUTPATIENT)
Dept: ANTICOAGULATION | Facility: CLINIC | Age: 62
End: 2022-03-31
Payer: COMMERCIAL

## 2022-03-31 DIAGNOSIS — M15.9 GENERALIZED OSTEOARTHRITIS: ICD-10-CM

## 2022-03-31 NOTE — TELEPHONE ENCOUNTER
ANTICOAGULATION     Damien Mcclendon is overdue for INR check.      Spoke with Damien and scheduled lab appointment on 4/6 @ [facility]    Rosa Maria Winkler RN

## 2022-04-02 NOTE — TELEPHONE ENCOUNTER
"Routing refill request to provider for review/approval because:  Labs out of range:    Labs not current:    A break in medication, last filled for 30 days on 12/21/21    Last Written Prescription Date:  12/21/21  Last Fill Quantity: 30,  # refills: 0   Last office visit provider:  8/17/21     Requested Prescriptions   Pending Prescriptions Disp Refills     celecoxib (CELEBREX) 200 MG capsule [Pharmacy Med Name: Celecoxib Oral Capsule 200 MG] 30 capsule 0     Sig: TAKE ONE CAPSULE BY MOUTH ONE TIME DAILY       NSAID Medications Failed - 3/31/2022 12:43 PM        Failed - Normal AST on file in past 12 months     Recent Labs   Lab Test 08/17/21  1618   AST 63*             Failed - Normal CBC on file in past 12 months     Recent Labs   Lab Test 02/17/21  1750   WBC 7.1   RBC 4.63   HGB 15.6   HCT 46.5   *                 Passed - Blood pressure under 140/90 in past 12 months     BP Readings from Last 3 Encounters:   08/17/21 122/82   02/17/21 106/60   08/04/20 100/62                 Passed - Normal ALT on file in past 12 months     Recent Labs   Lab Test 08/17/21  1618   ALT 41             Passed - Recent (12 mo) or future (30 days) visit within the authorizing provider's specialty     Patient has had an office visit with the authorizing provider or a provider within the authorizing providers department within the previous 12 mos or has a future within next 30 days. See \"Patient Info\" tab in inbasket, or \"Choose Columns\" in Meds & Orders section of the refill encounter.              Passed - Patient is age 6-64 years        Passed - Medication is active on med list        Passed - Normal serum creatinine on file in past 12 months     Recent Labs   Lab Test 08/17/21  1618   CR 1.09       Ok to refill medication if creatinine is low               Noelle Crockett, RN 04/02/22 2:12 PM  "

## 2022-04-04 RX ORDER — CELECOXIB 200 MG/1
CAPSULE ORAL
Qty: 30 CAPSULE | Refills: 0 | Status: SHIPPED | OUTPATIENT
Start: 2022-04-04 | End: 2022-05-24

## 2022-04-13 ENCOUNTER — LAB (OUTPATIENT)
Dept: LAB | Facility: CLINIC | Age: 62
End: 2022-04-13
Payer: COMMERCIAL

## 2022-04-13 ENCOUNTER — ANTICOAGULATION THERAPY VISIT (OUTPATIENT)
Dept: ANTICOAGULATION | Facility: CLINIC | Age: 62
End: 2022-04-13

## 2022-04-13 DIAGNOSIS — Z79.01 LONG TERM (CURRENT) USE OF ANTICOAGULANTS: ICD-10-CM

## 2022-04-13 DIAGNOSIS — Z86.718 HISTORY OF DVT (DEEP VEIN THROMBOSIS): Primary | ICD-10-CM

## 2022-04-13 LAB — INR BLD: 2.1 (ref 0.9–1.1)

## 2022-04-13 PROCEDURE — 36416 COLLJ CAPILLARY BLOOD SPEC: CPT

## 2022-04-13 PROCEDURE — 85610 PROTHROMBIN TIME: CPT

## 2022-04-13 NOTE — PROGRESS NOTES
ANTICOAGULATION MANAGEMENT     Damien Mcclendon 61 year old male is on warfarin with therapeutic INR result. (Goal INR 2.0-3.0)    Recent labs: (last 7 days)     04/13/22  1427   INR 2.1*       ASSESSMENT       Source(s): Chart Review, Patient/Caregiver Call and Template       Warfarin doses taken: Warfarin taken as instructed    Diet: No new diet changes identified    New illness, injury, or hospitalization: Yes: ER on 3/24/22 for fall, patient went back to ER on 4/3 for back pain r/t to fall. Patient has follow up with PCP next week, hoping for ortho referral.     Medication/supplement changes: None noted    Signs or symptoms of bleeding or clotting: No    Previous INR: Therapeutic last 2(+) visits    Additional findings: None       PLAN     Recommended plan for temporary change(s) affecting INR     Dosing Instructions: continue your current warfarin dose with next INR in 5 weeks       Summary  As of 4/13/2022    Full warfarin instructions:  2.5 mg every Mon, Wed, Fri; 5 mg all other days   Next INR check:  5/18/2022             Telephone call with Damien who verbalizes understanding and agrees to plan    Lab visit scheduled    Education provided: Goal range and significance of current result, Importance of therapeutic range and Contact [phone] with any changes, questions or concerns.     Plan made per [facility] anticoagulation protocol    Angel Montelongo RN  Anticoagulation Clinic  4/13/2022    _______________________________________________________________________     Anticoagulation Episode Summary     Current INR goal:  2.0-3.0   TTR:  62.6 % (1 y)   Target end date:  Indefinite   Send INR reminders to:  [facility]    Indications    History of DVT (deep vein thrombosis) [Z86.718]  Long term (current) use of anticoagulants [Z79.01]           Comments:           Anticoagulation Care Providers     Provider Role Specialty Phone number    Ronald Webb MD Referring Internal Medicine [phone]

## 2022-04-27 ENCOUNTER — ANTICOAGULATION THERAPY VISIT (OUTPATIENT)
Dept: ANTICOAGULATION | Facility: CLINIC | Age: 62
End: 2022-04-27

## 2022-04-27 ENCOUNTER — OFFICE VISIT (OUTPATIENT)
Dept: INTERNAL MEDICINE | Facility: CLINIC | Age: 62
End: 2022-04-27
Payer: COMMERCIAL

## 2022-04-27 VITALS
SYSTOLIC BLOOD PRESSURE: 122 MMHG | OXYGEN SATURATION: 98 % | TEMPERATURE: 98.2 F | HEIGHT: 72 IN | DIASTOLIC BLOOD PRESSURE: 80 MMHG | BODY MASS INDEX: 24.92 KG/M2 | WEIGHT: 184 LBS | HEART RATE: 94 BPM

## 2022-04-27 DIAGNOSIS — Z79.01 LONG TERM (CURRENT) USE OF ANTICOAGULANTS: ICD-10-CM
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
## 2022-04-27 DIAGNOSIS — D69.6 THROMBOCYTOPENIA (H): ICD-10-CM

## 2022-04-27 DIAGNOSIS — E87.1 HYPONATREMIA: ICD-10-CM

## 2022-04-27 DIAGNOSIS — R41.3 MEMORY LOSS: ICD-10-CM

## 2022-04-27 DIAGNOSIS — I10 ESSENTIAL HYPERTENSION, BENIGN: ICD-10-CM

## 2022-04-27 DIAGNOSIS — M54.50 ACUTE MIDLINE LOW BACK PAIN WITHOUT SCIATICA: ICD-10-CM

## 2022-04-27 DIAGNOSIS — Z00.00 ROUTINE GENERAL MEDICAL EXAMINATION AT A HEALTH CARE FACILITY: Primary | ICD-10-CM

## 2022-04-27 DIAGNOSIS — Z86.718 HISTORY OF DVT (DEEP VEIN THROMBOSIS): Primary | ICD-10-CM

## 2022-04-27 DIAGNOSIS — I48.11 LONGSTANDING PERSISTENT ATRIAL FIBRILLATION (H): ICD-10-CM

## 2022-04-27 DIAGNOSIS — E78.2 MIXED HYPERLIPIDEMIA: ICD-10-CM

## 2022-04-27 DIAGNOSIS — Z72.0 TOBACCO ABUSE: ICD-10-CM

## 2022-04-27 DIAGNOSIS — R73.03 PREDIABETES: ICD-10-CM

## 2022-04-27 LAB
ALBUMIN SERPL-MCNC: 3.7 G/DL (ref 3.5–5)
ALBUMIN UR-MCNC: ABNORMAL MG/DL
ALP SERPL-CCNC: 103 U/L (ref 45–120)
ALT SERPL W P-5'-P-CCNC: 17 U/L (ref 0–45)
ANION GAP SERPL CALCULATED.3IONS-SCNC: 11 MMOL/L (ref 5–18)
APPEARANCE UR: CLEAR
AST SERPL W P-5'-P-CCNC: 23 U/L (ref 0–40)
BACTERIA #/AREA URNS HPF: ABNORMAL /HPF
BASOPHILS # BLD AUTO: 0 10E3/UL (ref 0–0.2)
BASOPHILS NFR BLD AUTO: 1 %
BILIRUB SERPL-MCNC: 0.9 MG/DL (ref 0–1)
BILIRUB UR QL STRIP: ABNORMAL
BUN SERPL-MCNC: 18 MG/DL (ref 8–22)
CALCIUM SERPL-MCNC: 9.7 MG/DL (ref 8.5–10.5)
CHLORIDE BLD-SCNC: 97 MMOL/L (ref 98–107)
CHOLEST SERPL-MCNC: 151 MG/DL
CO2 SERPL-SCNC: 24 MMOL/L (ref 22–31)
COLOR UR AUTO: YELLOW
CREAT SERPL-MCNC: 1.04 MG/DL (ref 0.7–1.3)
EOSINOPHIL # BLD AUTO: 0.1 10E3/UL (ref 0–0.7)
EOSINOPHIL NFR BLD AUTO: 1 %
ERYTHROCYTE [DISTWIDTH] IN BLOOD BY AUTOMATED COUNT: 12.7 % (ref 10–15)
FASTING STATUS PATIENT QL REPORTED: YES
GFR SERPL CREATININE-BSD FRML MDRD: 82 ML/MIN/1.73M2
GLUCOSE BLD-MCNC: 131 MG/DL (ref 70–125)
GLUCOSE UR STRIP-MCNC: NEGATIVE MG/DL
HBA1C MFR BLD: 5.8 % (ref 0–5.6)
HCT VFR BLD AUTO: 45.1 % (ref 40–53)
HDLC SERPL-MCNC: 49 MG/DL
HGB BLD-MCNC: 15 G/DL (ref 13.3–17.7)
HGB UR QL STRIP: NEGATIVE
IMM GRANULOCYTES # BLD: 0 10E3/UL
IMM GRANULOCYTES NFR BLD: 0 %
INR BLD: 2.5 (ref 0.9–1.1)
KETONES UR STRIP-MCNC: NEGATIVE MG/DL
LDLC SERPL CALC-MCNC: 87 MG/DL
LEUKOCYTE ESTERASE UR QL STRIP: NEGATIVE
LYMPHOCYTES # BLD AUTO: 1 10E3/UL (ref 0.8–5.3)
LYMPHOCYTES NFR BLD AUTO: 17 %
MCH RBC QN AUTO: 33.9 PG (ref 26.5–33)
MCHC RBC AUTO-ENTMCNC: 33.3 G/DL (ref 31.5–36.5)
MCV RBC AUTO: 102 FL (ref 78–100)
MONOCYTES # BLD AUTO: 0.4 10E3/UL (ref 0–1.3)
MONOCYTES NFR BLD AUTO: 7 %
NEUTROPHILS # BLD AUTO: 4.2 10E3/UL (ref 1.6–8.3)
NEUTROPHILS NFR BLD AUTO: 74 %
NITRATE UR QL: NEGATIVE
PH UR STRIP: 5.5 [PH] (ref 5–8)
PLATELET # BLD AUTO: 150 10E3/UL (ref 150–450)
POTASSIUM BLD-SCNC: 5.1 MMOL/L (ref 3.5–5)
PROT SERPL-MCNC: 7.5 G/DL (ref 6–8)
PSA SERPL-MCNC: 1.64 UG/L (ref 0–4.5)
RBC # BLD AUTO: 4.43 10E6/UL (ref 4.4–5.9)
RBC #/AREA URNS AUTO: ABNORMAL /HPF
SODIUM SERPL-SCNC: 132 MMOL/L (ref 136–145)
SP GR UR STRIP: >=1.03 (ref 1–1.03)
SQUAMOUS #/AREA URNS AUTO: ABNORMAL /LPF
TRIGL SERPL-MCNC: 74 MG/DL
UROBILINOGEN UR STRIP-ACNC: 0.2 E.U./DL
WBC # BLD AUTO: 5.8 10E3/UL (ref 4–11)
WBC #/AREA URNS AUTO: ABNORMAL /HPF

## 2022-04-27 PROCEDURE — 80053 COMPREHEN METABOLIC PANEL: CPT | Performed by: INTERNAL MEDICINE

## 2022-04-27 PROCEDURE — 81001 URINALYSIS AUTO W/SCOPE: CPT | Performed by: INTERNAL MEDICINE

## 2022-04-27 PROCEDURE — 83036 HEMOGLOBIN GLYCOSYLATED A1C: CPT | Performed by: INTERNAL MEDICINE

## 2022-04-27 PROCEDURE — 99396 PREV VISIT EST AGE 40-64: CPT | Mod: 25 | Performed by: INTERNAL MEDICINE

## 2022-04-27 PROCEDURE — 80061 LIPID PANEL: CPT | Performed by: INTERNAL MEDICINE

## 2022-04-27 PROCEDURE — 36415 COLL VENOUS BLD VENIPUNCTURE: CPT | Performed by: INTERNAL MEDICINE

## 2022-04-27 PROCEDURE — G0103 PSA SCREENING: HCPCS | Performed by: INTERNAL MEDICINE

## 2022-04-27 PROCEDURE — 85025 COMPLETE CBC W/AUTO DIFF WBC: CPT | Performed by: INTERNAL MEDICINE

## 2022-04-27 PROCEDURE — 90682 RIV4 VACC RECOMBINANT DNA IM: CPT | Performed by: INTERNAL MEDICINE

## 2022-04-27 PROCEDURE — 99214 OFFICE O/P EST MOD 30 MIN: CPT | Mod: 25 | Performed by: INTERNAL MEDICINE

## 2022-04-27 PROCEDURE — 85610 PROTHROMBIN TIME: CPT | Performed by: INTERNAL MEDICINE

## 2022-04-27 PROCEDURE — 90471 IMMUNIZATION ADMIN: CPT | Performed by: INTERNAL MEDICINE

## 2022-04-27 ASSESSMENT — ENCOUNTER SYMPTOMS: BACK PAIN: 1

## 2022-04-27 NOTE — PROGRESS NOTES
SUBJECTIVE:   CC: Damien Mcclendon is an 61 year old male who presents for preventative health visit.     Facundo comes in today for follow-up and for physical.  He fell on the ice while getting off the bus here few weeks ago.  He has had 2 ER visits [facility].  He has had CT scanning of his LS spine.  His pain is midline low back.  Hard for him to get around even.  He has not been able to work.  He is on a work leave.  Pain does not radiate down the legs.  No bowel or bladder issues.  He is using Tylenol and Celebrex and he tells me he also has had some Aleve.  He was warned about this today.    He continues to smoke.  He is down to about 5 cigarettes a day.    His venous ulcers are well controlled with compression and triamcinolone.  He is not using it too terribly frequently.    Memory has been stable.    He denies other new concerns for me.  Patient has been advised of split billing requirements and indicates understanding: Yes  Healthy Habits:     Getting at least 3 servings of Calcium per day:  Yes    Bi-annual eye exam:  Yes    Dental care twice a year:  NO    Sleep apnea or symptoms of sleep apnea:  None    Diet:  Regular (no restrictions)    Frequency of exercise:  1 day/week    Duration of exercise:  15-30 minutes    Taking medications regularly:  Yes    Medication side effects:  None    PHQ-2 Total Score: 0    Additional concerns today:  Yes  Back Pain                 Today's PHQ-2 Score:   PHQ-2 ( 1999 Pfizer) 4/27/2022   Q1: Little interest or pleasure in doing things 0   Q2: Feeling down, depressed or hopeless 0   PHQ-2 Score 0   Q1: Little interest or pleasure in doing things Not at all   Q2: Feeling down, depressed or hopeless Not at all   PHQ-2 Score 0       Abuse: Current or Past(Physical, Sexual or Emotional)- No  Do you feel safe in your environment? Yes    Have you ever done Advance Care Planning? (For example, a Health Directive, POLST, or a discussion with a medical provider or your loved ones  about your wishes): No, advance care planning information given to patient to review.  Advanced care planning was discussed at today's visit.    Social History     Tobacco Use     Smoking status: Current Every Day Smoker     Packs/day: 0.50     Smokeless tobacco: Never Used   Substance Use Topics     Alcohol use: Yes     Alcohol/week: 2.0 standard drinks     Comment: Alcoholic Drinks/day: drinks 1-2 times per week.     If you drink alcohol do you typically have >3 drinks per day or >7 drinks per week? Yes      Alcohol Use 4/27/2022   Prescreen: >3 drinks/day or >7 drinks/week? Yes   AUDIT SCORE  7     AUDIT - Alcohol Use Disorders Identification Test - Reproduced from the World Health Organization Audit 2001 (Second Edition) 4/27/2022   1.  How often do you have a drink containing alcohol? 2 to 3 times a week   2.  How many drinks containing alcohol do you have on a typical day when you are drinking? 3 or 4   3.  How often do you have five or more drinks on one occasion? Less than monthly   4.  How often during the last year have you found that you were not able to stop drinking once you had started? Less than monthly   5.  How often during the last year have you failed to do what was normally expected of you because of drinking? Never   6.  How often during the last year have you needed a first drink in the morning to get yourself going after a heavy drinking session? Never   7.  How often during the last year have you had a feeling of guilt or remorse after drinking? Never   8.  How often during the last year have you been unable to remember what happened the night before because of your drinking? Less than monthly   9.  Have you or someone else been injured because of your drinking? No   10. Has a relative, friend, doctor or other health care worker been concerned about your drinking or suggested you cut down? No   TOTAL SCORE 7       Last PSA:   Prostate Specific Antigen Screen   Date Value Ref Range Status    02/17/2021 1.1 0.0 - 4.5 ng/mL Final       Reviewed orders with patient. Reviewed health maintenance and updated orders accordingly - Yes      Reviewed and updated as needed this visit by clinical staff   Tobacco  Allergies  Meds                Reviewed and updated as needed this visit by Provider                       Review of Systems   Musculoskeletal: Positive for back pain.      ROS: 10 point ROS neg other than the symptoms noted above in the HPI.    OBJECTIVE:   /80 (BP Location: Left arm, Patient Position: Sitting, Cuff Size: Adult Regular)   Pulse 94   Temp 98.2  F (36.8  C)   Ht 1.829 m (6')   Wt 83.5 kg (184 lb)   SpO2 98%   BMI 24.95 kg/m      Physical Exam  GENERAL: healthy, alert and no distress  EYES: Eyes grossly normal to inspection, PERRL and conjunctivae and sclerae normal  HENT: normal cephalic/atraumatic and ear canals and TM's normal  NECK: no adenopathy, no asymmetry, masses, or scars and thyroid normal to palpation  RESP: lungs clear to auscultation - no rales, rhonchi or wheezes  CV: irregularly irregular rhythm and normal S1 S2, no S3 or S4  ABDOMEN: soft, nontender, no hepatosplenomegaly, no masses and bowel sounds normal   (male): normal male genitalia without lesions or urethral discharge, no hernia  MS: Back with tenderness in the low lumbar region.  SKIN: Venous stasis changes with significant varicosities left lower extremity greater than right venous stasis dermatitis left greater than right as well.  NEURO: Normal strength and tone, mentation intact and speech normal  PSYCH: mentation appears normal, affect normal/bright        ASSESSMENT/PLAN:   1. Routine general medical examination at a health care facility  We discussed lung cancer screening today.  He probably is a candidate.  I gave him some information to read.  We will touch base in a few weeks over the phone and he will let me know if he wants to pursue that.  Labs as below.  Flu shot today.  He will be  due for his COVID booster for a while.  I have urged smoking cessation.  - Comprehensive metabolic panel (BMP + Alb, Alk Phos, ALT, AST, Total. Bili, TP); Future  - UA Macro with Reflex to Micro and Culture - lab collect; Future  - Lipid panel reflex to direct LDL Fasting; Future  - CBC with platelets and differential; Future  - Hemoglobin A1c; Future  - PSA, screen; Future  - Comprehensive metabolic panel (BMP + Alb, Alk Phos, ALT, AST, Total. Bili, TP)  - UA Macro with Reflex to Micro and Culture - lab collect  - Lipid panel reflex to direct LDL Fasting  - CBC with platelets and differential  - Hemoglobin A1c  - PSA, screen  - Urine Microscopic    2. Fall, subsequent encounter  See below.    3. Acute midline low back pain without sciatica  His back pain is quite profound.  We will have him meet with spine.  - Spine Referral; Future    4. Longstanding persistent atrial fibrillation (H)  INR today for monitoring.    5. Thrombocytopenia (H)  Blood counts today for monitoring.  - CBC with platelets and differential; Future  - CBC with platelets and differential    6. Tobacco abuse  Urged cessation.  I have recommended lung cancer screening to begin.    7. Mixed hyperlipidemia  Lipids today on his current regimen.  - Lipid panel reflex to direct LDL Fasting; Future  - Lipid panel reflex to direct LDL Fasting    8. Memory Lapses Or Loss  This is stable.  No evidence of significant memory loss today.  Follow clinically.    9. Prediabetes  We will check A1c today.  - Hemoglobin A1c; Future  - Hemoglobin A1c    10. Essential hypertension, benign  Blood pressure well controlled.  Continue regimen.  - Comprehensive metabolic panel (BMP + Alb, Alk Phos, ALT, AST, Total. Bili, TP); Future  - UA Macro with Reflex to Micro and Culture - lab collect; Future  - Comprehensive metabolic panel (BMP + Alb, Alk Phos, ALT, AST, Total. Bili, TP)  - UA Macro with Reflex to Micro and Culture - lab collect  - Urine Microscopic    11. Long  term (current) use of anticoagulants    - INR point of care    Patient has been advised of split billing requirements and indicates understanding: Yes    COUNSELING:   Reviewed preventive health counseling, as reflected in patient instructions    Estimated body mass index is 24.95 kg/m  as calculated from the following:    Height as of this encounter: 1.829 m (6').    Weight as of this encounter: 83.5 kg (184 lb).         He reports that he has been smoking. He has been smoking about 0.50 packs per day. He has never used smokeless tobacco.  Tobacco Cessation Action Plan:   Information offered: Patient not interested at this time      Counseling Resources:  ATP IV Guidelines  Pooled Cohorts Equation Calculator  FRAX Risk Assessment  ICSI Preventive Guidelines  Dietary Guidelines for Americans, 2010  USDA's MyPlate  ASA Prophylaxis  Lung CA Screening    ANTHONY COX MD  [facility]

## 2022-04-27 NOTE — PATIENT INSTRUCTIONS
HOW TO QUIT SMOKING  Smoking is one of the hardest habits to break. About half of all those who have ever smoked have been able to quit, and most of those (about 70%) who still smoke want to quit. Here are some of the best ways to stop smoking.     KEEP TRYING:  It takes most smokers about 8 tries before they are finally able to fully quit. So, the more often you try and fail, the better your chance of quitting the next time! So, don't give up!    GO COLD TURKEY:  Most ex-smokers quit cold turkey. Trying to cut back gradually doesn't seem to work as well, perhaps because it continues the smoking habit. Also, it is possible to fool yourself by inhaling more while smoking fewer cigarettes. This results in the same amount of nicotine in your body!    GET SUPPORT:  Support programs can make an important difference, especially for the heavy smoker. These groups offer lectures, methods to change your behavior and peer support. Call the free national Quitline for more information. 800-QUIT-NOW (545-495-4147). Low-cost or free programs are offered by many hospitals, local chapters of the American Lung Association (419-574-6093) and the American Cancer Society (011-351-6014). Support at home is important too. Non-smokers can help by offering praise and encouragement. If the smoker fails to quit, encourage them to try again!    OVER-THE-COUNTER MEDICINES:  For those who can't quit on their own, Nicotine Replacement Therapy (NRT) may make quitting much easier. Certain aids such as the nicotine patch, gum and lozenge are available without a prescription. However, it is best to use these under the guidance of your doctor. The skin patch provides a steady supply of nicotine to the body. Nicotine gum and lozenge gives temporary bursts of low levels of nicotine. Both methods take the edge off the craving for cigarettes. WARNING: If you feel symptoms of nicotine overdose, such as nausea, vomiting, dizziness, weakness, or fast  heartbeat, stop using these and see your doctor.    PRESCRIPTION MEDICINES:  After evaluating your smoking patterns and prior attempts at quitting, your doctor may offer a prescription medicine such as bupropion (Zyban, Wellbutrin), varenicline (Chantix, Champix), a niocotine inhaler or nasal spray. Each has its unique advantage and side effects which your doctor can review with you.    HEALTH BENEFITS OF QUITTING:  The benefits of quitting start right away and keep improving the longer you go without smokin minutes: blood pressure and pulse return to normal  8 hours: oxygen levels return to normal  2 days: ability to smell and taste begins to improve as damaged nerves start to regrow  2-3 weeks: circulation and lung function improves  1-9 months: decreased cough, congestion and shortness of breath; less tired  1 year: risk of heart attack decreases by half  5 years: risk of lung cancer decreases by half; risk of stroke becomes the same as a non-smoker  For information about how to quit smoking, visit the following links:  National Cancer [place] ,   Clearing the Air, Quit Smoking Today   - an online booklet. http://www.smokefree.gov/pubs/clearing_the_air.pdf  Smokefree.gov http://smokefree.gov/  QuitNet http://www.quitnet.com/    5351-7047 Yanira Jones, [address] [place] [address], [place] [address]. All rights reserved. This information is not intended as a substitute for professional medical care. Always follow your healthcare professional's instructions.

## 2022-04-27 NOTE — PROGRESS NOTES
ANTICOAGULATION MANAGEMENT     Damien Mcclendon 61 year old male is on warfarin with therapeutic INR result. (Goal INR 2.0-3.0)    Recent labs: (last 7 days)     04/27/22  1631   INR 2.5*       ASSESSMENT       Source(s): Chart Review and Patient/Caregiver Call       Warfarin doses taken: Warfarin taken as instructed    Diet: No new diet changes identified    New illness, injury, or hospitalization: No    Medication/supplement changes: None noted    Signs or symptoms of bleeding or clotting: No    Previous INR: Therapeutic last 2(+) visits    Additional findings: None       PLAN     Recommended plan for no diet, medication or health factor changes affecting INR     Dosing Instructions: continue your current warfarin dose with next INR in 4 weeks       Summary  As of 4/27/2022    Full warfarin instructions:  2.5 mg every Mon, Wed, Fri; 5 mg all other days   Next INR check:  5/25/2022             Telephone call with Damien who verbalizes understanding and agrees to plan    Lab visit scheduled    Education provided: None required    Plan made per [facility] anticoagulation protocol    Mal Castaneda RN  Anticoagulation Clinic  4/27/2022    _______________________________________________________________________     Anticoagulation Episode Summary     Current INR goal:  2.0-3.0   TTR:  62.6 % (1 y)   Target end date:  Indefinite   Send INR reminders to:  [facility]    Indications    History of DVT (deep vein thrombosis) [Z86.718]  Long term (current) use of anticoagulants [Z79.01]           Comments:           Anticoagulation Care Providers     Provider Role Specialty Phone number    Ronald Webb MD Referring Internal Medicine [phone]

## 2022-04-27 NOTE — LETTER
Detail Level: Zone May 4, 2022      Damien Mcclendon  [address]  [place] [address]        Dear ,    We are writing to inform you of your test results.    Damien, we can go over your labs when we touch base in a couple weeks.  The only thing to note is your sodium was a little low.  This could be from pain.  I would like to recheck the sodium before we meet.  Please make a lab appointment for that.  Everything else here looks good/stable.       Resulted Orders   Comprehensive metabolic panel (BMP + Alb, Alk Phos, ALT, AST, Total. Bili, TP)   Result Value Ref Range    Sodium 132 (L) 136 - 145 mmol/L    Potassium 5.1 (H) 3.5 - 5.0 mmol/L    Chloride 97 (L) 98 - 107 mmol/L    Carbon Dioxide (CO2) 24 22 - 31 mmol/L    Anion Gap 11 5 - 18 mmol/L    Urea Nitrogen 18 8 - 22 mg/dL    Creatinine 1.04 0.70 - 1.30 mg/dL    Calcium 9.7 8.5 - 10.5 mg/dL    Glucose 131 (H) 70 - 125 mg/dL    Alkaline Phosphatase 103 45 - 120 U/L    AST 23 0 - 40 U/L    ALT 17 0 - 45 U/L    Protein Total 7.5 6.0 - 8.0 g/dL    Albumin 3.7 3.5 - 5.0 g/dL    Bilirubin Total 0.9 0.0 - 1.0 mg/dL    GFR Estimate 82 >60 mL/min/1.73m2      Comment:      Effective December 21, 2021 eGFRcr in adults is calculated using the 2021 CKD-EPI creatinine equation which includes age and gender (Jose et al., NEJM, DOI: 10.1056/IITIns6030107)   UA Macro with Reflex to Micro and Culture - lab collect   Result Value Ref Range    Color Urine Yellow Colorless, Straw, Light Yellow, Yellow    Appearance Urine Clear Clear    Glucose Urine Negative Negative mg/dL    Bilirubin Urine Small (A) Negative    Ketones Urine Negative Negative mg/dL    Specific Gravity Urine >=1.030 1.005 - 1.030    Blood Urine Negative Negative    pH Urine 5.5 5.0 - 8.0    Protein Albumin Urine Trace (A) Negative mg/dL    Urobilinogen Urine 0.2 0.2, 1.0 E.U./dL    Nitrite Urine Negative Negative    Leukocyte Esterase Urine Negative Negative   Lipid panel reflex to direct LDL Fasting   Result Value Ref  Range    Cholesterol 151 <=199 mg/dL    Triglycerides 74 <=149 mg/dL    Direct Measure HDL 49 >=40 mg/dL      Comment:      HDL Cholesterol Reference Range:     0-2 years:   No reference ranges established for patients under 2 years old  at [facility] for lipid analytes.    2-8 years:  Greater than 45 mg/dL     18 years and older:   Female: Greater than or equal to 50 mg/dL   Male:   Greater than or equal to 40 mg/dL    LDL Cholesterol Calculated 87 <=129 mg/dL    Patient Fasting > 8hrs? Yes    Hemoglobin A1c   Result Value Ref Range    Hemoglobin A1C 5.8 (H) 0.0 - 5.6 %      Comment:      Normal <5.7%   Prediabetes 5.7-6.4%    Diabetes 6.5% or higher     Note: Adopted from ADA consensus guidelines.   PSA, screen   Result Value Ref Range    Prostate Specific Antigen Screen 1.64 0.00 - 4.50 ug/L    Narrative    Assay Method is Abbott Prostate-Specific Antigen (PSA)  Standard-WHO 1st International (90:10)   CBC with platelets and differential   Result Value Ref Range    WBC Count 5.8 4.0 - 11.0 10e3/uL    RBC Count 4.43 4.40 - 5.90 10e6/uL    Hemoglobin 15.0 13.3 - 17.7 g/dL    Hematocrit 45.1 40.0 - 53.0 %     (H) 78 - 100 fL    MCH 33.9 (H) 26.5 - 33.0 pg    MCHC 33.3 31.5 - 36.5 g/dL    RDW 12.7 10.0 - 15.0 %    Platelet Count 150 150 - 450 10e3/uL    % Neutrophils 74 %    % Lymphocytes 17 %    % Monocytes 7 %    % Eosinophils 1 %    % Basophils 1 %    % Immature Granulocytes 0 %    Absolute Neutrophils 4.2 1.6 - 8.3 10e3/uL    Absolute Lymphocytes 1.0 0.8 - 5.3 10e3/uL    Absolute Monocytes 0.4 0.0 - 1.3 10e3/uL    Absolute Eosinophils 0.1 0.0 - 0.7 10e3/uL    Absolute Basophils 0.0 0.0 - 0.2 10e3/uL    Absolute Immature Granulocytes 0.0 <=0.4 10e3/uL   Urine Microscopic   Result Value Ref Range    Bacteria Urine None Seen None Seen /HPF    RBC Urine 0-2 0-2 /HPF /HPF    WBC Urine 0-5 0-5 /HPF /HPF    Squamous Epithelials Urine Few (A) None Seen /LPF    Narrative    Urine Culture not indicated        If you have any questions or concerns, please call the clinic at the number listed above.       Sincerely,      Ronald Webb MD

## 2022-05-04 ENCOUNTER — OFFICE VISIT (OUTPATIENT)
Dept: PHYSICAL MEDICINE AND REHAB | Facility: CLINIC | Age: 62
End: 2022-05-04
Attending: INTERNAL MEDICINE
Payer: COMMERCIAL

## 2022-05-04 VITALS
DIASTOLIC BLOOD PRESSURE: 68 MMHG | WEIGHT: 184 LBS | HEIGHT: 72 IN | BODY MASS INDEX: 24.92 KG/M2 | SYSTOLIC BLOOD PRESSURE: 128 MMHG

## 2022-05-04 DIAGNOSIS — M51.9 SCHMORL'S NODE: ICD-10-CM

## 2022-05-04 DIAGNOSIS — M51.369 DDD (DEGENERATIVE DISC DISEASE), LUMBAR: ICD-10-CM

## 2022-05-04 DIAGNOSIS — M48.061 LUMBAR FORAMINAL STENOSIS: ICD-10-CM

## 2022-05-04 DIAGNOSIS — M48.061 SPINAL STENOSIS OF LUMBAR REGION WITHOUT NEUROGENIC CLAUDICATION: ICD-10-CM

## 2022-05-04 DIAGNOSIS — M54.50 ACUTE MIDLINE LOW BACK PAIN WITHOUT SCIATICA: Primary | ICD-10-CM

## 2022-05-04 PROCEDURE — 99204 OFFICE O/P NEW MOD 45 MIN: CPT | Performed by: NURSE PRACTITIONER

## 2022-05-04 RX ORDER — OXYCODONE AND ACETAMINOPHEN 5; 325 MG/1; MG/1
1 TABLET ORAL 3 TIMES DAILY PRN
Qty: 15 TABLET | Refills: 0 | Status: SHIPPED | OUTPATIENT
Start: 2022-05-04 | End: 2022-05-06

## 2022-05-04 ASSESSMENT — PAIN SCALES - GENERAL: PAINLEVEL: SEVERE PAIN (7)

## 2022-05-04 NOTE — PATIENT INSTRUCTIONS
~[facility] scheduling #[phone].  ~Please call our [facility] Nurse Navigation line [phone] with any questions or concerns about your treatment plan, if symptoms worsen and you would like to be seen urgently, or if you have problems controlling bladder and bowel function.       You have been prescribed a controlled substance medication Percocet today for pain control.   This medication must be taken as prescribed only as it can be very dangerous to your health if taken more than prescribed.  We discussed the risks (eg, addiction, overdose, worsening pain, death) verses the potential benefit of opioid medication use. Explained that this medication will not be a long term solution to ongoing pain. Discussed using lowest effective dose and the importance of other measures for pain management including physical therapy, other non-opioid medications, behavioral treatments, acupuncture and massage, and other procedure options.     For any further refills on opioid pain medication you must come in to the clinic in person to discuss further in which you may or may not receive refills.       Please call [facility] to schedule your image, [phone].  [facility] location  [address] [place] [id number]   [phone]
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
## 2022-05-05 DIAGNOSIS — I48.19 PERSISTENT ATRIAL FIBRILLATION (H): ICD-10-CM

## 2022-05-06 DIAGNOSIS — M54.50 ACUTE MIDLINE LOW BACK PAIN WITHOUT SCIATICA: ICD-10-CM

## 2022-05-06 RX ORDER — OXYCODONE AND ACETAMINOPHEN 5; 325 MG/1; MG/1
1 TABLET ORAL 3 TIMES DAILY PRN
Qty: 15 TABLET | Refills: 0 | Status: SHIPPED | OUTPATIENT
Start: 2022-05-06 | End: 2022-05-24

## 2022-05-06 NOTE — TELEPHONE ENCOUNTER
Received a fax from [facility] [address] [address]. Need Rx from [place] registered provider for Percocet 5-325 mg 1 tab TID PRN for severe pain.     Rejection status: Joseline FOOTEGerman Is not enrolled in [place] Medicaid.

## 2022-05-06 NOTE — TELEPHONE ENCOUNTER
This patient was seen by Joseline Franklin on 5/4/22. Percocet 5-325 mg #15, 0 refill was sent to pharmacy but we got a rejection noticing saying that Joseline is not enrolled in [place] Medicaid. Need Rx from a [place] registered provider. Joseline is off today and won't be in until 5/9/22.     I will message the other providers if they can send in a new Rx to pharmacy. Percocet was pending for provider to review.

## 2022-05-09 RX ORDER — METOPROLOL SUCCINATE 200 MG/1
TABLET, EXTENDED RELEASE ORAL
Qty: 90 TABLET | Refills: 3 | Status: SHIPPED | OUTPATIENT
Start: 2022-05-09 | End: 2023-05-24

## 2022-05-09 NOTE — TELEPHONE ENCOUNTER
"Last Written Prescription Date:  2/1/22  Last Fill Quantity: 90,  # refills: 0   Last office visit provider:  4/27/22     Requested Prescriptions   Pending Prescriptions Disp Refills     metoprolol succinate ER (TOPROL XL) 200 MG 24 hr tablet [Pharmacy Med Name: Metoprolol Succinate ER Oral Tablet Extended Release 24 Hour 200 MG] 90 tablet 0     Sig: TAKE ONE TABLET BY MOUTH ONE TIME DAILY       Beta-Blockers Protocol Passed - 5/9/2022  8:25 AM        Passed - Blood pressure under 140/90 in past 12 months     BP Readings from Last 3 Encounters:   05/04/22 128/68   04/27/22 122/80   08/17/21 122/82                 Passed - Patient is age 6 or older        Passed - Recent (12 mo) or future (30 days) visit within the authorizing provider's specialty     Patient has had an office visit with the authorizing provider or a provider within the authorizing providers department within the previous 12 mos or has a future within next 30 days. See \"Patient Info\" tab in inbasket, or \"Choose Columns\" in Meds & Orders section of the refill encounter.              Passed - Medication is active on med list             Rubin Lundberg RN 05/09/22 8:25 AM    "

## 2022-05-11 ENCOUNTER — ANTICOAGULATION THERAPY VISIT (OUTPATIENT)
Dept: ANTICOAGULATION | Facility: CLINIC | Age: 62
End: 2022-05-11

## 2022-05-11 ENCOUNTER — LAB (OUTPATIENT)
Dept: LAB | Facility: CLINIC | Age: 62
End: 2022-05-11
Payer: COMMERCIAL

## 2022-05-11 DIAGNOSIS — Z79.01 LONG TERM (CURRENT) USE OF ANTICOAGULANTS: ICD-10-CM

## 2022-05-11 DIAGNOSIS — E87.1 HYPONATREMIA: ICD-10-CM

## 2022-05-11 DIAGNOSIS — Z86.718 HISTORY OF DVT (DEEP VEIN THROMBOSIS): Primary | ICD-10-CM

## 2022-05-11 LAB
INR BLD: 2.2 (ref 0.9–1.1)
SODIUM SERPL-SCNC: 133 MMOL/L (ref 136–145)

## 2022-05-11 PROCEDURE — 84295 ASSAY OF SERUM SODIUM: CPT

## 2022-05-11 PROCEDURE — 85610 PROTHROMBIN TIME: CPT

## 2022-05-11 PROCEDURE — 36415 COLL VENOUS BLD VENIPUNCTURE: CPT

## 2022-05-11 NOTE — PROGRESS NOTES
ANTICOAGULATION MANAGEMENT     Damien Mcclendon 62 year old male is on warfarin with therapeutic INR result. (Goal INR 2.0-3.0)    Recent labs: (last 7 days)     05/11/22  1416   INR 2.2*       ASSESSMENT       Source(s): Chart Review, Patient/Caregiver Call and Template       Warfarin doses taken: Warfarin taken as instructed    Diet: No new diet changes identified    New illness, injury, or hospitalization: No    Medication/supplement changes: None noted    Signs or symptoms of bleeding or clotting: No    Previous INR: Therapeutic last 5 visits    Additional findings: None       PLAN     Recommended plan for no diet, medication or health factor changes affecting INR     Dosing Instructions: continue your current warfarin dose with next INR in 4-6 weeks       Summary  As of 5/11/2022    Full warfarin instructions:  2.5 mg every Mon, Wed, Fri; 5 mg all other days   Next INR check:  6/22/2022             Telephone call with  Damien ([phone]) who verbalizes understanding and agrees to plan    Patient offered & declined to schedule next visit    Education provided: Importance of consistent vitamin K intake and Goal range and significance of current result    Plan made per [facility] anticoagulation protocol    Rosa Maria Winkler RN  Anticoagulation Clinic  5/11/2022    _______________________________________________________________________     Anticoagulation Episode Summary     Current INR goal:  2.0-3.0   TTR:  62.6 % (1 y)   Target end date:  Indefinite   Send INR reminders to:  [facility]    Indications    History of DVT (deep vein thrombosis) [Z86.718]  Long term (current) use of anticoagulants [Z79.01]           Comments:           Anticoagulation Care Providers     Provider Role Specialty Phone number    Ronald Webb MD Referring Internal Medicine [phone]

## 2022-05-17 ENCOUNTER — TELEPHONE (OUTPATIENT)
Dept: PHYSICAL MEDICINE AND REHAB | Facility: CLINIC | Age: 62
End: 2022-05-17
Payer: COMMERCIAL

## 2022-05-17 ENCOUNTER — HOSPITAL ENCOUNTER (OUTPATIENT)
Dept: MRI IMAGING | Facility: HOSPITAL | Age: 62
Discharge: HOME | End: 2022-05-17
Attending: NURSE PRACTITIONER | Admitting: NURSE PRACTITIONER
Payer: COMMERCIAL

## 2022-05-17 DIAGNOSIS — M48.061 SPINAL STENOSIS OF LUMBAR REGION WITHOUT NEUROGENIC CLAUDICATION: ICD-10-CM

## 2022-05-17 DIAGNOSIS — M48.061 LUMBAR FORAMINAL STENOSIS: ICD-10-CM

## 2022-05-17 DIAGNOSIS — M54.50 ACUTE MIDLINE LOW BACK PAIN WITHOUT SCIATICA: Primary | ICD-10-CM

## 2022-05-17 DIAGNOSIS — S32.050A CLOSED COMPRESSION FRACTURE OF L5 LUMBAR VERTEBRA, INITIAL ENCOUNTER (H): ICD-10-CM

## 2022-05-17 DIAGNOSIS — M54.50 ACUTE MIDLINE LOW BACK PAIN WITHOUT SCIATICA: ICD-10-CM

## 2022-05-17 DIAGNOSIS — M51.369 DDD (DEGENERATIVE DISC DISEASE), LUMBAR: ICD-10-CM

## 2022-05-17 DIAGNOSIS — M51.9 SCHMORL'S NODE: ICD-10-CM

## 2022-05-17 PROCEDURE — 72148 MRI LUMBAR SPINE W/O DYE: CPT

## 2022-05-17 RX ORDER — CALCITONIN SALMON 200 [IU]/.09ML
1 SPRAY, METERED NASAL DAILY
Qty: 3.7 ML | Refills: 0 | Status: SHIPPED | OUTPATIENT
Start: 2022-05-17 | End: 2023-09-12

## 2022-05-17 NOTE — TELEPHONE ENCOUNTER
----- Message from Joseline Franklin CNP sent at 5/17/2022  2:21 PM CDT -----  Please call patient and notify him that I reviewed his MRI.  Please let him know that there is an L5 compression fracture as well as sacral insufficiency fracture likely from his fall recently.  Ordered a LSO back brace to stabilize the fracture as well as calcitonin for fracture related pain.  Recommend standing x-ray in 4 weeks to evaluate compression fracture stability.  Can follow-up for medication management at any point if needed.  In person or video visit is okay.  Thanks, Joseline

## 2022-05-17 NOTE — TELEPHONE ENCOUNTER
Call placed to pt with results and recommendations. Patient stated understanding.     He will  Calcitonin prescription. He is aware that he will need an appointment should he need refill of his Percocet.     He will await calls from [employer] company to get back brace and from radiology to schedule lumbar spine x-rays 1 month out.     Advised patient to contact the Spine Center at any time with any questions or concerns.

## 2022-05-21 DIAGNOSIS — E78.2 MIXED HYPERLIPIDEMIA: ICD-10-CM

## 2022-05-23 RX ORDER — ROSUVASTATIN CALCIUM 10 MG/1
TABLET, COATED ORAL
Qty: 90 TABLET | Refills: 3 | Status: SHIPPED | OUTPATIENT
Start: 2022-05-23 | End: 2023-09-12

## 2022-05-23 NOTE — TELEPHONE ENCOUNTER
"Last Written Prescription Date:  2/9/22  Last Fill Quantity: 90,  # refills: 0   Last office visit provider:  4/27/22     Requested Prescriptions   Pending Prescriptions Disp Refills     rosuvastatin (CRESTOR) 10 MG tablet [Pharmacy Med Name: Rosuvastatin Calcium Oral Tablet 10 MG] 90 tablet 0     Sig: TAKE 1 TABLET BY MOUTH EVERY NIGHT AT BEDTIME       Statins Protocol Passed - 5/23/2022 10:18 AM        Passed - LDL on file in past 12 months     Recent Labs   Lab Test 04/27/22  1640   LDL 87             Passed - No abnormal creatine kinase in past 12 months     No lab results found.             Passed - Recent (12 mo) or future (30 days) visit within the authorizing provider's specialty     Patient has had an office visit with the authorizing provider or a provider within the authorizing providers department within the previous 12 mos or has a future within next 30 days. See \"Patient Info\" tab in inbasket, or \"Choose Columns\" in Meds & Orders section of the refill encounter.              Passed - Medication is active on med list        Passed - Patient is age 18 or older             Rubin Lundberg RN 05/23/22 10:18 AM  "

## 2022-05-24 ENCOUNTER — VIRTUAL VISIT (OUTPATIENT)
Dept: INTERNAL MEDICINE | Facility: CLINIC | Age: 62
End: 2022-05-24
Payer: COMMERCIAL

## 2022-05-24 DIAGNOSIS — S32.10XD CLOSED FRACTURE OF SACRUM WITH ROUTINE HEALING, UNSPECIFIED PORTION OF SACRUM, SUBSEQUENT ENCOUNTER: ICD-10-CM

## 2022-05-24 DIAGNOSIS — Z72.0 TOBACCO ABUSE: ICD-10-CM

## 2022-05-24 DIAGNOSIS — S32.059D CLOSED FRACTURE OF FIFTH LUMBAR VERTEBRA WITH ROUTINE HEALING, UNSPECIFIED FRACTURE MORPHOLOGY, SUBSEQUENT ENCOUNTER: Primary | ICD-10-CM

## 2022-05-24 PROCEDURE — 99212 OFFICE O/P EST SF 10 MIN: CPT | Mod: 95 | Performed by: INTERNAL MEDICINE

## 2022-05-24 NOTE — PROGRESS NOTES
Damien is a 62 year old who is being evaluated via a billable telephone visit.      What phone number would you like to be contacted at? [phone]  How would you like to obtain your AVS? Mail a copy    Phone call duration: 8 minutes    Daniel joins me on a telephone visit.  He was found to have L5 compression fracture and sacral insufficiency fracture from his fall.  He does have known osteopenia but no osteoporosis.  This was a traumatic fracture rather than fragility.  He is on calcitonin nasal spray.  He is following with spine clinic.  He needs some paperwork completed for work he tells me.  He does want to proceed with the lung cancer screening.  He was given information and he is aware of the risks versus benefits.    1. Closed fracture of fifth lumbar vertebra with routine healing, unspecified fracture morphology, subsequent encounter  Continue with calcitonin nasal spray and pain control per spine clinic.    2. Closed fracture of sacrum with routine healing, unspecified portion of sacrum, subsequent encounter  As above.  He does also have a brace he is using.  I will likely redo a bone density next year.  This is not a fragility fracture but he does have known thin bones    3. Tobacco abuse  Lung Cancer Screening Shared Decision Making Visit     Damien Mcclendon is eligible for lung cancer screening on the basis of:   has not not experienced symptoms suggestive of lung cancer.   born on 1960, 62 year old years old.   smoked 0.75 packs of cigarettes for 40 years for a total of 30 pack-years   has not quit smoking  years ago/is currently smoking.      I have discussed with patient the risks and benefits of screening for lung cancer with low-dose CT.     The risks include:   radiation exposure    false positives     over-diagnosis    The benefit of early detection of lung cancer is contingent upon adherence to annual screening or more frequent follow up if indicated.     Furthermore, reaping the benefits  of screening requires Damien Mcclendon to be willing and able to undergo diagnostic procedures, if indicated.     We did discuss that the only way to prevent lung cancer is to not smoke. Smoking cessation assistance was offered.    - CT Chest Lung Cancer Scrn Low Dose wo; Future

## 2022-06-04 DIAGNOSIS — I10 ESSENTIAL HYPERTENSION, BENIGN: ICD-10-CM

## 2022-06-04 DIAGNOSIS — I48.11 LONGSTANDING PERSISTENT ATRIAL FIBRILLATION (H): ICD-10-CM

## 2022-06-05 RX ORDER — DILTIAZEM HYDROCHLORIDE 120 MG/1
CAPSULE, COATED, EXTENDED RELEASE ORAL
Qty: 90 CAPSULE | Refills: 3 | Status: SHIPPED | OUTPATIENT
Start: 2022-06-05 | End: 2023-06-05

## 2022-06-05 NOTE — TELEPHONE ENCOUNTER
"Routing refill request to provider for review/approval because:  Labs out of range:      Last Written Prescription Date:  3/1/22  Last Fill Quantity: 90,  # refills: 0   Last office visit provider:  5/24/22     Requested Prescriptions   Pending Prescriptions Disp Refills     lisinopril (ZESTRIL) 5 MG tablet [Pharmacy Med Name: Lisinopril Oral Tablet 5 MG] 90 tablet 3     Sig: TAKE ONE TABLET BY MOUTH ONE TIME DAILY       ACE Inhibitors (Including Combos) Protocol Failed - 6/4/2022  2:02 PM        Failed - Normal serum potassium on file in past 12 months     Recent Labs   Lab Test 04/27/22  1640 08/17/21  1618 06/25/21  1541   POTASSIUM 5.1*   < >  --    [id number]  --   --  4.5    < > = values in this interval not displayed.             Passed - Blood pressure under 140/90 in past 12 months     BP Readings from Last 3 Encounters:   05/04/22 128/68   04/27/22 122/80   08/17/21 122/82                 Passed - Recent (12 mo) or future (30 days) visit within the authorizing provider's specialty     Patient has had an office visit with the authorizing provider or a provider within the authorizing providers department within the previous 12 mos or has a future within next 30 days. See \"Patient Info\" tab in inbasket, or \"Choose Columns\" in Meds & Orders section of the refill encounter.              Passed - Medication is active on med list        Passed - Patient is age 18 or older        Passed - Normal serum creatinine on file in past 12 months     Recent Labs   Lab Test 04/27/22  1640   CR 1.04       Ok to refill medication if creatinine is low           Signed Prescriptions Disp Refills    diltiazem ER COATED BEADS (CARDIZEM CD/CARTIA XT) 120 MG 24 hr capsule 90 capsule 3     Sig: TAKE ONE CAPSULE BY MOUTH ONE TIME DAILY       Calcium Channel Blockers Protocol  Passed - 6/4/2022  2:02 PM        Passed - Blood pressure under 140/90 in past 12 months     BP Readings from Last 3 Encounters:   05/04/22 128/68   04/27/22 122/80 " "  08/17/21 122/82                 Passed - Normal ALT in past 12 months     Recent Labs   Lab Test 04/27/22  1640   ALT 17             Passed - Recent (12 mo) or future (30 days) visit within the authorizing provider's specialty     Patient has had an office visit with the authorizing provider or a provider within the authorizing providers department within the previous 12 mos or has a future within next 30 days. See \"Patient Info\" tab in inbasket, or \"Choose Columns\" in Meds & Orders section of the refill encounter.              Passed - Medication is active on med list        Passed - Patient is age 18 or older        Passed - Normal serum creatinine on file in past 12 months     Recent Labs   Lab Test 04/27/22  1640   CR 1.04       Ok to refill medication if creatinine is low               Ilene Stevens, RN 06/05/22 6:10 PM  "

## 2022-06-05 NOTE — TELEPHONE ENCOUNTER
"Last Written Prescription Date:  3/1/22  Last Fill Quantity: 90,  # refills: 0   Last office visit provider:  5/24/22     Requested Prescriptions   Pending Prescriptions Disp Refills     diltiazem ER COATED BEADS (CARDIZEM CD/CARTIA XT) 120 MG 24 hr capsule [Pharmacy Med Name: dilTIAZem HCl ER Coated Beads Oral Capsule Extended Release 24 Hour 120 MG] 90 capsule 0     Sig: TAKE ONE CAPSULE BY MOUTH ONE TIME DAILY       Calcium Channel Blockers Protocol  Passed - 6/4/2022  2:02 PM        Passed - Blood pressure under 140/90 in past 12 months     BP Readings from Last 3 Encounters:   05/04/22 128/68   04/27/22 122/80   08/17/21 122/82                 Passed - Normal ALT in past 12 months     Recent Labs   Lab Test 04/27/22  1640   ALT 17             Passed - Recent (12 mo) or future (30 days) visit within the authorizing provider's specialty     Patient has had an office visit with the authorizing provider or a provider within the authorizing providers department within the previous 12 mos or has a future within next 30 days. See \"Patient Info\" tab in inbasket, or \"Choose Columns\" in Meds & Orders section of the refill encounter.              Passed - Medication is active on med list        Passed - Patient is age 18 or older        Passed - Normal serum creatinine on file in past 12 months     Recent Labs   Lab Test 04/27/22  1640   CR 1.04       Ok to refill medication if creatinine is low             lisinopril (ZESTRIL) 5 MG tablet [Pharmacy Med Name: Lisinopril Oral Tablet 5 MG] 90 tablet 0     Sig: TAKE ONE TABLET BY MOUTH ONE TIME DAILY       ACE Inhibitors (Including Combos) Protocol Failed - 6/4/2022  2:02 PM        Failed - Normal serum potassium on file in past 12 months     Recent Labs   Lab Test 04/27/22  1640 08/17/21  1618 06/25/21  1541   POTASSIUM 5.1*   < >  --    32551  --   --  4.5    < > = values in this interval not displayed.             Passed - Blood pressure under 140/90 in past 12 months     " "BP Readings from Last 3 Encounters:   05/04/22 128/68   04/27/22 122/80   08/17/21 122/82                 Passed - Recent (12 mo) or future (30 days) visit within the authorizing provider's specialty     Patient has had an office visit with the authorizing provider or a provider within the authorizing providers department within the previous 12 mos or has a future within next 30 days. See \"Patient Info\" tab in inbasket, or \"Choose Columns\" in Meds & Orders section of the refill encounter.              Passed - Medication is active on med list        Passed - Patient is age 18 or older        Passed - Normal serum creatinine on file in past 12 months     Recent Labs   Lab Test 04/27/22  1640   CR 1.04       Ok to refill medication if creatinine is low               Ilene Stevens RN 06/05/22 6:09 PM  "

## 2022-06-06 RX ORDER — LISINOPRIL 5 MG/1
TABLET ORAL
Qty: 90 TABLET | Refills: 3 | Status: SHIPPED | OUTPATIENT
Start: 2022-06-06 | End: 2023-06-16

## 2022-06-29 ENCOUNTER — TELEPHONE (OUTPATIENT)
Dept: ANTICOAGULATION | Facility: CLINIC | Age: 62
End: 2022-06-29

## 2022-07-06 ENCOUNTER — TELEPHONE (OUTPATIENT)
Dept: ANTICOAGULATION | Facility: CLINIC | Age: 62
End: 2022-07-06

## 2022-07-06 NOTE — TELEPHONE ENCOUNTER
ANTICOAGULATION     Damien Mcclendon is overdue for INR check.      Spoke with Damien and scheduled lab appointment on 7/14    Rosa Maria Winkler RN

## 2022-07-13 ENCOUNTER — TELEPHONE (OUTPATIENT)
Dept: INTERNAL MEDICINE | Facility: CLINIC | Age: 62
End: 2022-07-13

## 2022-07-13 NOTE — TELEPHONE ENCOUNTER
07/13/2022 Pt came in and dropped of FMLA form for work stated it needed to be completed by today or he will lose his job. Pt stated he has been laid up and hard to get a around, gave to Roshan

## 2022-07-20 ENCOUNTER — ANTICOAGULATION THERAPY VISIT (OUTPATIENT)
Dept: ANTICOAGULATION | Facility: CLINIC | Age: 62
End: 2022-07-20

## 2022-07-20 ENCOUNTER — LAB (OUTPATIENT)
Dept: LAB | Facility: CLINIC | Age: 62
End: 2022-07-20
Payer: COMMERCIAL

## 2022-07-20 DIAGNOSIS — Z79.01 LONG TERM (CURRENT) USE OF ANTICOAGULANTS: ICD-10-CM

## 2022-07-20 DIAGNOSIS — Z86.718 HISTORY OF DVT (DEEP VEIN THROMBOSIS): Primary | ICD-10-CM

## 2022-07-20 LAB — INR BLD: 2.7 (ref 0.9–1.1)

## 2022-07-20 PROCEDURE — 85610 PROTHROMBIN TIME: CPT

## 2022-07-20 PROCEDURE — 36416 COLLJ CAPILLARY BLOOD SPEC: CPT

## 2022-07-20 NOTE — PROGRESS NOTES
ANTICOAGULATION MANAGEMENT     Damien Mcclendon 62 year old male is on warfarin with therapeutic INR result. (Goal INR 2.0-3.0)    Recent labs: (last 7 days)     07/20/22  1440   INR 2.7*       ASSESSMENT       Source(s): Chart Review, Patient/Caregiver Call and Template       Warfarin doses taken: Warfarin taken as instructed    Diet: No new diet changes identified    New illness, injury, or hospitalization: No    Medication/supplement changes: None noted    Signs or symptoms of bleeding or clotting: No    Previous INR: Therapeutic last 6 visits    Additional findings: None       PLAN     Recommended plan for no diet, medication or health factor changes affecting INR     Dosing Instructions:   (evenings. 5mg tab)    continue your current warfarin dose with next INR in 7 weeks       Summary  As of 7/20/2022    Full warfarin instructions:  2.5 mg every Mon, Wed, Fri; 5 mg all other days   Next INR check:  9/7/2022             Telephone call with  Damien [phone]) who verbalizes understanding and agrees to plan.   - this a record for Damien to have consecutive therapeutic INR's.   - advised to ensure he takes warfarin daily and stay consistent eating Vitamin-K foods.    Patient offered & declined to schedule next visit    Education provided: Importance of consistent vitamin K intake, Goal range and significance of current result and Importance of taking warfarin as instructed    Plan made per [facility] anticoagulation protocol    Rosa Maria Winkler RN  Anticoagulation Clinic  7/20/2022    _______________________________________________________________________     Anticoagulation Episode Summary     Current INR goal:  2.0-3.0   TTR:  73.9 % (1 y)   Target end date:  Indefinite   Send INR reminders to:  [facility]    Indications    History of DVT (deep vein thrombosis) [Z86.718]  Long term (current) use of anticoagulants [Z79.01]           Comments:           Anticoagulation Care Providers     Provider Role Specialty  Phone number    Ronald Webb MD Referring Internal Medicine [phone]

## 2022-08-23 ENCOUNTER — DOCUMENTATION ONLY (OUTPATIENT)
Dept: INTERNAL MEDICINE | Facility: CLINIC | Age: 62
End: 2022-08-23

## 2022-09-02 NOTE — TELEPHONE ENCOUNTER
ANTICOAGULATION  MANAGEMENT PROGRAM    Damien Mcclendon is overdue for INR check.  Reminder call made.    Left message for Damien. If returning call, please schedule INR check as soon as possible.    Rosa Maria Winkler RN     normal...

## 2022-09-14 ENCOUNTER — TELEPHONE (OUTPATIENT)
Dept: ANTICOAGULATION | Facility: CLINIC | Age: 62
End: 2022-09-14

## 2022-09-21 ENCOUNTER — TELEPHONE (OUTPATIENT)
Dept: ANTICOAGULATION | Facility: CLINIC | Age: 62
End: 2022-09-21

## 2022-10-04 ENCOUNTER — TELEPHONE (OUTPATIENT)
Dept: ANTICOAGULATION | Facility: CLINIC | Age: 62
End: 2022-10-04

## 2022-10-04 NOTE — LETTER
[facility]TICO[facility]ULATION CLINIC  [address] [facility] [address]  Phone: [phone]  Fax: [phone]       October 4, 2022        Damien Mcclendon  [address]  [place] [address]            Dear Damien,    You are currently under the care of [facility] for your warfarin (Coumadin , Jantoven ) therapy.  We are contacting you because our records show you were due for an INR on 9/7/22.    Last INR was in 7/20/22.    There are potentially serious risks when taking warfarin without careful monitoring and we want to make sure you are safely managed.  Routine lab monitoring is required for warfarin refills.     Please call [phone] as soon as possible to schedule an appointment.  If there has been a change in your care or other concerns, please let us know so we can help and or update our records.     Sincerely,       [facility]

## 2022-10-04 NOTE — TELEPHONE ENCOUNTER
ANTICOAGULATION     Damien Mcclendon is overdue for INR check.      Reminder letter sent    Rosa Maria Winkler RN

## 2022-10-18 ENCOUNTER — TELEPHONE (OUTPATIENT)
Dept: ANTICOAGULATION | Facility: CLINIC | Age: 62
End: 2022-10-18

## 2022-10-18 NOTE — LETTER
[facility] ANTICOAGULATION CLINIC  [address] [facility] [address]  Phone: [phone]  Fax: [phone]   October 18, 2022        Damien Mcclendon  [address]  [place] [address]            Dear Damien,    You are currently under the care of [facility] for your warfarin (Coumadin , Jantoven ) therapy.  We are contacting you because our records show you were due for an INR on 9/7/22.    Last INR was in July 20, 2022.    There are potentially serious risks when taking warfarin without careful monitoring and we want to make sure you are safely managed.  Routine lab monitoring is required for warfarin refills.     Please call [phone] as soon as possible to schedule an appointment.  If there has been a change in your care or other concerns, please let us know so we can help and or update our records.     Sincerely,       [facility]

## 2022-10-18 NOTE — TELEPHONE ENCOUNTER
Anticoagulation clinic notificiation    Dr. Webb,    Your patient, Damien Mcclendon, is past due for an INR. Their last result was 2.7 on 7/20/22 and was due to come back on 9/7/22.    Damien Mcclendon received phone calls and letters over the last several weeks in attempt to arrange a follow up appointment.  Damien Mcclendon will be sent another letter today.     No action is required from you unless you have additional information or if you would like to reach out personally to Damien Mcclendon.    Please don t hesitate to contact the Anticoagulation Management Program if you have any questions or concerns.     Sincerely,     [facility]

## 2022-11-15 ENCOUNTER — TELEPHONE (OUTPATIENT)
Dept: ANTICOAGULATION | Facility: CLINIC | Age: 62
End: 2022-11-15

## 2022-11-15 NOTE — TELEPHONE ENCOUNTER
ANTICOAGULATION     Damien Mcclendon is overdue for INR check.      Spoke with Damien and scheduled lab appointment on 11/17    - last INR on 7/20/22.   - No show on 9/28/22.    Rosa Maria Winkler RN

## 2022-12-05 ENCOUNTER — DOCUMENTATION ONLY (OUTPATIENT)
Dept: ANTICOAGULATION | Facility: CLINIC | Age: 62
End: 2022-12-05
Payer: COMMERCIAL

## 2022-12-05 ENCOUNTER — TELEPHONE (OUTPATIENT)
Dept: ANTICOAGULATION | Facility: CLINIC | Age: 62
End: 2022-12-05

## 2022-12-05 DIAGNOSIS — Z53.9 ERRONEOUS ENCOUNTER--DISREGARD: Primary | ICD-10-CM

## 2022-12-05 NOTE — TELEPHONE ENCOUNTER
ANTICOAGULATION MANAGEMENT PROGRAM    Dr. Webb,     Our records indicate that Damien Mcclendon remains past due to check INR. Damien Mcclendon was contacted multiple times over at least the last 8 weeks to attempt to arrange a follow up appointment.    Damien Mcclendon last had an INR checked on 7/20/22 and was due for follow up on 9/7/22.     Called patient,Spoke with Damien and scheduled lab appointment on Thurs. 12/8/22     At this time Damien Mcclendon will be moved to noncompliant status within the program until their referral expires on 3/4/2023. While in noncompliant status the patient would continue to be contacted every 6 weeks by the anticoagulation program to attempt to schedule patient. You will be notified of each contact attempt to make you aware of patient's ongoing noncompliance.        Thank you,     [facility]

## 2022-12-08 ENCOUNTER — ANTICOAGULATION THERAPY VISIT (OUTPATIENT)
Dept: ANTICOAGULATION | Facility: CLINIC | Age: 62
End: 2022-12-08

## 2022-12-08 ENCOUNTER — LAB (OUTPATIENT)
Dept: LAB | Facility: CLINIC | Age: 62
End: 2022-12-08
Payer: COMMERCIAL

## 2022-12-08 DIAGNOSIS — Z86.718 HISTORY OF DVT (DEEP VEIN THROMBOSIS): Primary | Chronic | ICD-10-CM

## 2022-12-08 DIAGNOSIS — Z79.01 LONG TERM (CURRENT) USE OF ANTICOAGULANTS: ICD-10-CM

## 2022-12-08 LAB — INR BLD: 2.8 (ref 0.9–1.1)

## 2022-12-08 PROCEDURE — 36416 COLLJ CAPILLARY BLOOD SPEC: CPT

## 2022-12-08 PROCEDURE — 85610 PROTHROMBIN TIME: CPT

## 2022-12-08 NOTE — PROGRESS NOTES
ANTICOAGULATION MANAGEMENT     Damien Mcclendon 62 year old male is on warfarin with therapeutic INR result. (Goal INR 2.0-3.0)    Recent labs: (last 7 days)     12/08/22  1445   INR 2.8*       ASSESSMENT       Source(s): Chart Review, Patient/Caregiver Call and Template       Warfarin doses taken: Warfarin taken as instructed    Diet: No new diet changes identified    New illness, injury, or hospitalization: No    Medication/supplement changes: None noted    Signs or symptoms of bleeding or clotting: No    Previous INR: Therapeutic last 7 visits    Additional findings: None       PLAN     Recommended plan for no diet, medication or health factor changes affecting INR     Dosing Instructions: Continue your current warfarin dose with next INR in 6-8   weeks       Summary  As of 12/8/2022    Full warfarin instructions:  2.5 mg every Mon, Wed, Fri; 5 mg all other days; Starting 12/8/2022   Next INR check:  1/19/2023             Telephone call with  Damien ([phone]) who verbalizes understanding and agrees to plan    Patient offered & declined to schedule next visit    Education provided:     Taking warfarin: Importance of taking warfarin as instructed    Goal range and lab monitoring: goal range and significance of current result    Dietary considerations: importance of consistent vitamin K intake    Plan made per [facility] anticoagulation protocol    Rosa Maria Winkler, RN  Anticoagulation Clinic  12/8/2022    _______________________________________________________________________     Anticoagulation Episode Summary     Current INR goal:  2.0-3.0   TTR:  99.3 % (7.5 mo)   Target end date:  Indefinite   Send INR reminders to:  [facility]    Indications    History of DVT (deep vein thrombosis) [Z86.718]  Long term (current) use of anticoagulants [Z79.01]           Comments:           Anticoagulation Care Providers     Provider Role Specialty Phone number    Ronald Webb MD Referring Internal Medicine [phone]

## 2023-01-26 ENCOUNTER — TELEPHONE (OUTPATIENT)
Dept: ANTICOAGULATION | Facility: CLINIC | Age: 63
End: 2023-01-26
Payer: COMMERCIAL

## 2023-01-26 NOTE — TELEPHONE ENCOUNTER
ANTICOAGULATION     Damien Mcclendon is overdue for INR check.      Spoke with Damien and scheduled lab appointment on 1/31    Rosa Maria Winkler RN

## 2023-02-02 ENCOUNTER — TELEPHONE (OUTPATIENT)
Dept: ANTICOAGULATION | Facility: CLINIC | Age: 63
End: 2023-02-02
Payer: COMMERCIAL

## 2023-02-02 NOTE — TELEPHONE ENCOUNTER
ANTICOAGULATION     Damien Mcclendon is overdue for INR check.      Spoke with Damien and scheduled lab appointment on 2/7    Mal Castaneda RN

## 2023-02-07 ENCOUNTER — DOCUMENTATION ONLY (OUTPATIENT)
Dept: INTERNAL MEDICINE | Facility: CLINIC | Age: 63
End: 2023-02-07

## 2023-02-07 DIAGNOSIS — I26.99 PULMONARY EMBOLISM (H): ICD-10-CM

## 2023-02-07 DIAGNOSIS — I48.11 LONGSTANDING PERSISTENT ATRIAL FIBRILLATION (H): ICD-10-CM

## 2023-02-07 DIAGNOSIS — Z86.718 HISTORY OF DVT (DEEP VEIN THROMBOSIS): Primary | ICD-10-CM

## 2023-02-07 DIAGNOSIS — I48.91 ATRIAL FIBRILLATION, UNSPECIFIED TYPE (H): ICD-10-CM

## 2023-02-07 DIAGNOSIS — Z79.01 LONG TERM (CURRENT) USE OF ANTICOAGULANTS: ICD-10-CM

## 2023-02-07 DIAGNOSIS — I48.19 PERSISTENT ATRIAL FIBRILLATION (H): ICD-10-CM

## 2023-02-07 DIAGNOSIS — Z86.718 PERSONAL HISTORY OF VENOUS THROMBOSIS AND EMBOLISM: ICD-10-CM

## 2023-02-07 NOTE — PROGRESS NOTES
ANTICOAGULATION CLINIC REFERRAL RENEWAL REQUEST       An annual renewal order is required for all patients referred to [facility].?  Please review and sign the pended referral order for Damien Mcclendon.       ANTICOAGULATION SUMMARY      Warfarin indication(s)   Atrial Fibrillation, DVT and PE   PVD    Mechanical heart valve present?  NO       Current goal range   INR: 2.0-3.0     Goal appropriate for indication? Goal INR 2-3, standard for indication(s) above     Time in Therapeutic Range (TTR)  (Goal > 60%) 99.3 %       Office visit with referring provider's group within last year Yes on 5/24/2022       Rosa Maria Winkler, RN  [facility]

## 2023-02-14 ENCOUNTER — TELEPHONE (OUTPATIENT)
Dept: ANTICOAGULATION | Facility: CLINIC | Age: 63
End: 2023-02-14

## 2023-02-14 ENCOUNTER — LAB (OUTPATIENT)
Dept: LAB | Facility: CLINIC | Age: 63
End: 2023-02-14
Payer: COMMERCIAL

## 2023-02-14 ENCOUNTER — ANTICOAGULATION THERAPY VISIT (OUTPATIENT)
Dept: ANTICOAGULATION | Facility: CLINIC | Age: 63
End: 2023-02-14

## 2023-02-14 DIAGNOSIS — I48.19 PERSISTENT ATRIAL FIBRILLATION (H): ICD-10-CM

## 2023-02-14 DIAGNOSIS — Z79.01 LONG TERM (CURRENT) USE OF ANTICOAGULANTS: ICD-10-CM

## 2023-02-14 DIAGNOSIS — Z86.718 HISTORY OF DVT (DEEP VEIN THROMBOSIS): Primary | Chronic | ICD-10-CM

## 2023-02-14 LAB — INR BLD: 2.3 (ref 0.9–1.1)

## 2023-02-14 PROCEDURE — 36416 COLLJ CAPILLARY BLOOD SPEC: CPT

## 2023-02-14 PROCEDURE — 85610 PROTHROMBIN TIME: CPT

## 2023-02-14 NOTE — TELEPHONE ENCOUNTER
Anticoagulation Management    Discussed INR home monitoring program with Damien Mcclendon reviewing:      E[employer]ility requirements: >= 3 months of anticoagulation therapy, indication for chronic anticoagulation and order from provider    Required testing frequency (q1-2 weeks)    Home meters, testing supplies, meter training, and reporting of INR results done through an outside company. Patient would be contacted by home monitoring company to review insurance coverage with home monitoring company prior to enrolling.    [employer] would continue to receive and manage INR results.    Home monitoring application may take several weeks and must continue to follow up with recommended INR monitoring in clinic until receives monitor and training completed.     Home monitoring terms reviewed with patient      Patient agrees to frequency of testing as directed by referring provider ( weekly or biweekly) Yes    Testing to be performed during business hours of [facility] Yes    Patient agrees they have the skill (or a designated caregiver) necessary to perform the self test Yes    Patient agrees to report all INR results to INR home monitoring company Yes    Patient agrees to have additional INR test in clinic if a home result is critical Yes    Patient agrees to use a [facility] approved service provider and device for home monitoring Yes    [facility]    Referring provider:  Ronald Webb M.D.    Referring providers Clinic Fax number [phone]    Damien Mcclendon is interested home INR monitoring and requests order be submitted.

## 2023-02-14 NOTE — PROGRESS NOTES
ANTICOAGULATION MANAGEMENT     Damien Mcclendon 62 year old male is on warfarin with therapeutic INR result. (Goal INR 2.0-3.0)    Recent labs: (last 7 days)     02/14/23  1423   INR 2.3*       ASSESSMENT       Source(s): Chart Review, Patient/Caregiver Call and Template       Warfarin doses taken: Warfarin taken as instructed    Diet: No new diet changes identified    New illness, injury, or hospitalization: No    Medication/supplement changes: None noted    Signs or symptoms of bleeding or clotting: No    Previous INR: Therapeutic last 8 visits    Additional findings:  Damien is interested in obtaining a home INR monitor.  Will enroll patient.       PLAN     Recommended plan for no diet, medication or health factor changes affecting INR     Dosing Instructions: Continue your current warfarin dose with next INR in 8 weeks       Summary  As of 2/14/2023    Full warfarin instructions:  2.5 mg every Mon, Wed, Fri; 5 mg all other days   Next INR check:  4/11/2023             Telephone call with  Damien ([phone]) who verbalizes understanding and agrees to plan    Patient offered & declined to schedule next visit    Education provided:     Taking warfarin: Importance of taking warfarin as instructed    Goal range and lab monitoring: goal range and significance of current result    Dietary considerations: importance of consistent vitamin K intake    Information on home INR monitoring    Plan made per [facility] anticoagulation protocol    Rosa Maria Winkler RN  Anticoagulation Clinic  2/14/2023    _______________________________________________________________________     Anticoagulation Episode Summary     Current INR goal:  2.0-3.0   TTR:  100.0 % (7.5 mo)   Target end date:  Indefinite   Send INR reminders to:  [facility]    Indications    History of DVT (deep vein thrombosis) [Z86.718]  Long term (current) use of anticoagulants [Z79.01]  Persistent atrial fibrillation (H) [I48.19]           Comments:            Anticoagulation Care Providers     Provider Role Specialty Phone number    Ronald Webb MD Referring Internal Medicine [phone]

## 2023-04-18 ENCOUNTER — TELEPHONE (OUTPATIENT)
Dept: ANTICOAGULATION | Facility: CLINIC | Age: 63
End: 2023-04-18
Payer: COMMERCIAL

## 2023-04-18 NOTE — TELEPHONE ENCOUNTER
ANTICOAGULATION     Damien Mcclendon is overdue for INR check.      Left message for patient to call and schedule lab appointment as soon as possible. If returning call, please schedule.     Leonor Whitfield RN

## 2023-04-19 LAB — INR (EXTERNAL): 3 (ref ?–1.3)

## 2023-04-24 ENCOUNTER — TELEPHONE (OUTPATIENT)
Dept: INTERNAL MEDICINE | Facility: CLINIC | Age: 63
End: 2023-04-24
Payer: COMMERCIAL

## 2023-04-24 NOTE — TELEPHONE ENCOUNTER
April 24, 2023    [facility] Patient Enrollment Form was received via fax for Dr. Webb to sign.  Patient label was attached to paperwork and placed in provider's inbox to be signed.    Jane Calvert

## 2023-04-25 ENCOUNTER — TELEPHONE (OUTPATIENT)
Dept: ANTICOAGULATION | Facility: CLINIC | Age: 63
End: 2023-04-25
Payer: COMMERCIAL

## 2023-04-25 DIAGNOSIS — Z86.718 HISTORY OF DVT (DEEP VEIN THROMBOSIS): Primary | Chronic | ICD-10-CM

## 2023-04-25 DIAGNOSIS — I48.19 PERSISTENT ATRIAL FIBRILLATION (H): ICD-10-CM

## 2023-04-25 DIAGNOSIS — Z79.01 LONG TERM (CURRENT) USE OF ANTICOAGULANTS: ICD-10-CM

## 2023-04-25 NOTE — TELEPHONE ENCOUNTER
"ANTICOAGULATION MANAGEMENT     Damien Mcclendon 62 year old male is on warfarin with therapeutic INR result. (Goal INR )    Recent labs: (last 7 days)     04/19/23  0000   INR 3.0*       ASSESSMENT       Source(s): Chart Review and Patient/Caregiver Call       Warfarin doses taken: Warfarin taken as instructed    Diet: No new diet changes identified    Medication/supplement changes: None noted    New illness, injury, or hospitalization: Yes: Went to ED 4/19/23 for chest pain. Has a \"regular cold\".    Signs or symptoms of bleeding or clotting: No    Previous result: Therapeutic last 2(+) visits    Additional findings: None         PLAN     Recommended plan for no diet, medication or health factor changes affecting INR     Dosing Instructions: Continue your current warfarin dose with next INR in 6 weeks       Summary  As of 4/25/2023    Full warfarin instructions:  2.5 mg every Mon, Wed, Fri; 5 mg all other days   Next INR check:  6/6/2023             Telephone call with Damien who verbalizes understanding and agrees to plan    He is hoping to have home testing meter soon.    Education provided:     Goal range and lab monitoring: goal range and significance of current result    Importance of notifying anticoagulation clinic for: changes in medications; a sooner lab recheck maybe needed    Contact [phone] with any changes, questions or concerns.     Plan made per [facility] anticoagulation protocol    Adrianna Bergman RN  Anticoagulation Clinic  4/25/2023    _______________________________________________________________________     Anticoagulation Episode Summary     Current INR goal:  2.0-3.0   TTR:  100.0 % (7.2 mo)   Target end date:  Indefinite   Send INR reminders to:  [facility]    Indications    History of DVT (deep vein thrombosis) [Z86.718]  Long term (current) use of anticoagulants [Z79.01]  Persistent atrial fibrillation (H) [I48.19]           Comments:           Anticoagulation Care Providers     Provider " Role Specialty Phone number    Ronald Webb MD Referring Internal Medicine [phone]

## 2023-04-25 NOTE — TELEPHONE ENCOUNTER
ANTICOAGULATION     Damien Mcclendon is overdue for INR check.      Spoke with Damien. Says he was in [place] and last INR was 3.0.     Adrianna Bergman RN

## 2023-05-11 ENCOUNTER — TELEPHONE (OUTPATIENT)
Dept: INTERNAL MEDICINE | Facility: CLINIC | Age: 63
End: 2023-05-11
Payer: COMMERCIAL

## 2023-05-11 NOTE — TELEPHONE ENCOUNTER
May 11, 2023    [facility] form was received via fax for Dr. Webb to sign.  Patient label was attached to paperwork and placed in provider's inbox to be signed.    Ajay Morin III

## 2023-05-23 DIAGNOSIS — I48.19 PERSISTENT ATRIAL FIBRILLATION (H): ICD-10-CM

## 2023-05-24 RX ORDER — METOPROLOL SUCCINATE 200 MG/1
TABLET, EXTENDED RELEASE ORAL
Qty: 90 TABLET | Refills: 0 | Status: SHIPPED | OUTPATIENT
Start: 2023-05-24 | End: 2023-08-23

## 2023-05-24 NOTE — TELEPHONE ENCOUNTER
"Routing refill request to provider for review/approval because:  Patient needs to be seen because it has been more than 1 year since last office visit.    Last blood pressure was over a year ago on 05/04/2022 =  128/68    Last Written Prescription Date:  05/09/2022  Last Fill Quantity: 90,  # refills: 3   Last office visit provider:  04/27/2022    Requested Prescriptions   Pending Prescriptions Disp Refills     metoprolol succinate ER (TOPROL XL) 200 MG 24 hr tablet [Pharmacy Med Name: Metoprolol Succinate ER Oral Tablet Extended Release 24 Hour 200 MG] 90 tablet 0     Sig: TAKE ONE TABLET BY MOUTH ONE TIME DAILY       Beta-Blockers Protocol Failed - 5/24/2023 12:17 PM        Failed - Blood pressure under 140/90 in past 12 months     BP Readings from Last 3 Encounters:   05/04/22 128/68   04/27/22 122/80   08/17/21 122/82                 Failed - Recent (12 mo) or future (30 days) visit within the authorizing provider's specialty     Patient has had an office visit with the authorizing provider or a provider within the authorizing providers department within the previous 12 mos or has a future within next 30 days. See \"Patient Info\" tab in inbasket, or \"Choose Columns\" in Meds & Orders section of the refill encounter.              Passed - Patient is age 6 or older        Passed - Medication is active on med list             Ewelina Conte RN 05/24/23 12:17 PM  "

## 2023-05-30 NOTE — TELEPHONE ENCOUNTER
Formed received but not signed. Patient will need an appointment to complete the form. Form will be back with [facility] until appointment.

## 2023-06-04 DIAGNOSIS — I48.11 LONGSTANDING PERSISTENT ATRIAL FIBRILLATION (H): ICD-10-CM

## 2023-06-05 RX ORDER — DILTIAZEM HYDROCHLORIDE 120 MG/1
CAPSULE, COATED, EXTENDED RELEASE ORAL
Qty: 90 CAPSULE | Refills: 0 | Status: SHIPPED | OUTPATIENT
Start: 2023-06-05 | End: 2023-09-12

## 2023-06-05 NOTE — TELEPHONE ENCOUNTER
"Routing refill request to provider for review/approval because:  BP not current  Labs not current:  ALT and CR  Patient needs to be seen because it has been more than 1 year since last office visit.    Last Written Prescription Date:  6/5/22  Last Fill Quantity: 90,  # refills: 3   Last office visit provider:  4/27/22     Requested Prescriptions   Pending Prescriptions Disp Refills     diltiazem ER COATED BEADS (CARDIZEM CD/CARTIA XT) 120 MG 24 hr capsule [Pharmacy Med Name: dilTIAZem HCl ER Coated Beads Oral Capsule Extended Release 24 Hour 120 MG] 90 capsule 0     Sig: TAKE ONE CAPSULE BY MOUTH ONE TIME DAILY       Calcium Channel Blockers Protocol  Failed - 6/4/2023 10:48 PM        Failed - Blood pressure under 140/90 in past 12 months     BP Readings from Last 3 Encounters:   05/04/22 128/68   04/27/22 122/80   08/17/21 122/82                 Failed - Normal ALT in past 12 months     Recent Labs   Lab Test 04/27/22  1640   ALT 17             Failed - Recent (12 mo) or future (30 days) visit within the authorizing provider's specialty     Patient has had an office visit with the authorizing provider or a provider within the authorizing providers department within the previous 12 mos or has a future within next 30 days. See \"Patient Info\" tab in inbasket, or \"Choose Columns\" in Meds & Orders section of the refill encounter.              Failed - Normal serum creatinine on file in past 12 months     Recent Labs   Lab Test 04/27/22  1640   CR 1.04       Ok to refill medication if creatinine is low          Passed - Medication is active on med list        Passed - Patient is age 18 or older             Payton Christopher RN 06/04/23 10:50 PM  "

## 2023-06-15 ENCOUNTER — TELEPHONE (OUTPATIENT)
Dept: ANTICOAGULATION | Facility: CLINIC | Age: 63
End: 2023-06-15
Payer: COMMERCIAL

## 2023-06-15 DIAGNOSIS — I10 ESSENTIAL HYPERTENSION, BENIGN: ICD-10-CM

## 2023-06-15 NOTE — TELEPHONE ENCOUNTER
ANTICOAGULATION     Damien Mcclendon is overdue for INR check.      Left message for patient to call and schedule lab appointment as soon as possible. If returning call, please schedule.     - cancelled INR appt on 6/16 / rescheduled on 6/20   - cancelled INR appt on 6/5   - cancelled INR appt on 5/8     Rosa Maria Winkler RN

## 2023-06-16 RX ORDER — LISINOPRIL 5 MG/1
TABLET ORAL
Qty: 90 TABLET | Refills: 0 | Status: SHIPPED | OUTPATIENT
Start: 2023-06-16 | End: 2023-09-12

## 2023-06-16 NOTE — TELEPHONE ENCOUNTER
"Routing refill request to provider for review/approval because:  Labs not current:  multiple  Patient needs to be seen because it has been more than 1 year since last office visit.  Blood pressure    Last Written Prescription Date:  6/6/2022  Last Fill Quantity: 90,  # refills: 3   Last office visit provider:  5/24/2022     Requested Prescriptions   Pending Prescriptions Disp Refills     lisinopril (ZESTRIL) 5 MG tablet [Pharmacy Med Name: Lisinopril Oral Tablet 5 MG] 90 tablet 0     Sig: TAKE ONE TABLET BY MOUTH ONE TIME DAILY       ACE Inhibitors (Including Combos) Protocol Failed - 6/15/2023 10:40 AM        Failed - Blood pressure under 140/90 in past 12 months     BP Readings from Last 3 Encounters:   05/04/22 128/68   04/27/22 122/80   08/17/21 122/82                 Failed - Recent (12 mo) or future (30 days) visit within the authorizing provider's specialty     Patient has had an office visit with the authorizing provider or a provider within the authorizing providers department within the previous 12 mos or has a future within next 30 days. See \"Patient Info\" tab in inbasket, or \"Choose Columns\" in Meds & Orders section of the refill encounter.              Failed - Normal serum creatinine on file in past 12 months     Recent Labs   Lab Test 04/27/22  1640   CR 1.04       Ok to refill medication if creatinine is low          Failed - Normal serum potassium on file in past 12 months     Recent Labs   Lab Test 04/27/22  1640 08/17/21  1618 06/25/21  1541   POTASSIUM 5.1*   < >  --    02882  --   --  4.5    < > = values in this interval not displayed.             Passed - Medication is active on med list        Passed - Patient is age 18 or older             Nataly Woodall RN 06/16/23 4:08 AM      "

## 2023-06-22 ENCOUNTER — TELEPHONE (OUTPATIENT)
Dept: ANTICOAGULATION | Facility: CLINIC | Age: 63
End: 2023-06-22
Payer: COMMERCIAL

## 2023-06-22 NOTE — TELEPHONE ENCOUNTER
ANTICOAGULATION     Damienvenu Mcclendon is overdue for INR check.      Spoke with Damien who declined to schedule a lab appointment at this time. If calling back please schedule as soon as possible.    - informed Damien, a review of his chart showed last INR wias 4/25/23; Recommended he come soon to check his INR.  Stated he will make INR appt.   - last INR was in 4/25/23.   - INR on 4/19/23 during ED visit @ [place].   - NO SHOW on 6/20 - stated he over slept for his appt.     - cancelled INR appt on 6/16 / rescheduled on 6/20              - cancelled INR appt on 6/5              - cancelled INR appt on 5/8    Rosa Maria Winkler RN

## 2023-06-30 ENCOUNTER — DOCUMENTATION ONLY (OUTPATIENT)
Dept: ANTICOAGULATION | Facility: CLINIC | Age: 63
End: 2023-06-30
Payer: COMMERCIAL

## 2023-06-30 NOTE — LETTER
[facility] ANTICOAGULATION CLINIC  [address] [facility] [address]  Phone: [phone]  Fax: [phone]   June 30, 2023        Damien Mcclendon  [address]  [place] [address]            Dear Damien,    You are currently under the care of [facility] Anticoagulation Management Program for your warfarin (Coumadin , Jantoven ) therapy.  We are contacting you because our records show you are past  overdue for an INR check.    Last INR with [facility] was in 2/14/23  Last INR from [facility] was 4/14/23.    There are potentially serious risks when taking warfarin without careful monitoring and we want to make sure you are safely managed.  Routine lab monitoring is required for warfarin refills.     Please call [phone] as soon as possible to schedule an appointment.  If there has been a change in your care or other concerns, please let us know so we can help and or update our records.     Sincerely,       [facility] Anticoagulation Management Program

## 2023-07-14 ENCOUNTER — DOCUMENTATION ONLY (OUTPATIENT)
Dept: ANTICOAGULATION | Facility: CLINIC | Age: 63
End: 2023-07-14
Payer: COMMERCIAL

## 2023-07-14 NOTE — LETTER
[facility] ANTICOAGULATION CLINIC  [address] [facility] [address]  Phone: [phone]  Fax: [phone]   July 14, 2023        Damien Mcclendon  [address]  [place] [address]            Dear Damien,    You are currently under the care of [facility] Anticoagulation Management Program for your warfarin (Coumadin , Jantoven ) therapy.  We are contacting you because our records show you were due for an INR on mid-May 2023.    Your last INR taken @ [facility] ED was in 4/25/23       There are potentially serious risks when taking warfarin without careful monitoring and we want to make sure you are safely managed.  Routine lab monitoring is required for warfarin refills.     Please call [phone] as soon as possible to schedule an appointment.  If there has been a change in your care or other concerns, please let us know so we can help and or update our records.     Sincerely,       [facility] Anticoagulation Management Program

## 2023-07-14 NOTE — PROGRESS NOTES
Anticoagulation clinic notificiation    Dr. Webb,    Your patient, Damien Mcclendon, is past due for an INR. Their last result was 3.0 on 4/25/23 from [place] ED, and was due to come back on mid-May to recheck INR.    Several NO SHOWS and cancellations of INR appt.    Damien Mcclendon received phone calls and letters over the last several weeks in attempt to arrange a follow up appointment.  Damien Mcclendon will be sent another letter today.     No action is required from you unless you have additional information or if you would like to reach out personally to Damien Mcclendon.    Please don t hesitate to contact the Anticoagulation Management Program if you have any questions or concerns.     Sincerely,     [facility] Anticoagulation Management Program

## 2023-07-18 ENCOUNTER — TELEPHONE (OUTPATIENT)
Dept: INTERNAL MEDICINE | Facility: CLINIC | Age: 63
End: 2023-07-18
Payer: COMMERCIAL

## 2023-07-18 NOTE — TELEPHONE ENCOUNTER
General Call    Contacts         Type Contact Phone/Fax    07/18/2023 01:44 PM CDT Phone (Incoming) [facility] (Other) [phone]     Jorje          Reason for Call:  Jorje    What are your questions or concerns:  [facility] staff called Jorje regarding patient being declined for INR due to no provider seen with them. Writer was unable to reach patient by phone today 7/18/23 1:45 pm and did not leave a VM. FYI    Date of last appointment with provider: 5/2022    Okay to leave a detailed message?: No at Other phone number:  [facility] Dave Evelia [phone]

## 2023-08-15 ENCOUNTER — TELEPHONE (OUTPATIENT)
Dept: ANTICOAGULATION | Facility: CLINIC | Age: 63
End: 2023-08-15
Payer: COMMERCIAL

## 2023-08-15 NOTE — TELEPHONE ENCOUNTER
ANTICOAGULATION MANAGEMENT PROGRAM    Dr. Webb,     Our records indicate that Damien Mcclendon remains past due to check INR. Damien Mcclendon was contacted multiple times over at least the last 8 weeks to attempt to arrange a follow up appointment.    Damien Mcclendon last had an INR checked on 4/119/2023 and was due for follow up on mid-May.    - has cancelled / no show / and rescheduled multiple times.  (X13)    Called patient,Left message for patient to call and schedule lab appointment as soon as possible. If returning call, please schedule.      At this time Damien Mcclendon will be moved to noncompliant status within the program until their referral expires on 2/7/2024. While in noncompliant status the patient would continue to be contacted every 6 weeks by the anticoagulation program to attempt to schedule patient. You will be notified of each contact attempt to make you aware of patient's ongoing noncompliance.        Thank you,     [facility] Anticoagulation Management Program

## 2023-08-23 DIAGNOSIS — I48.19 PERSISTENT ATRIAL FIBRILLATION (H): ICD-10-CM

## 2023-08-23 RX ORDER — METOPROLOL SUCCINATE 200 MG/1
TABLET, EXTENDED RELEASE ORAL
Qty: 90 TABLET | Refills: 0 | Status: SHIPPED | OUTPATIENT
Start: 2023-08-23 | End: 2023-09-12

## 2023-08-23 NOTE — TELEPHONE ENCOUNTER
"Routing refill request to provider for review/approval because:  Patient needs to be seen because it has been more than 1 year since last office visit.    Last Written Prescription Date:  5/24/2023  Last Fill Quantity: 90,  # refills: 0   Last office visit provider:  5/24/2022     Requested Prescriptions   Pending Prescriptions Disp Refills    metoprolol succinate ER (TOPROL XL) 200 MG 24 hr tablet [Pharmacy Med Name: Metoprolol Succinate ER Oral Tablet Extended Release 24 Hour 200 MG] 90 tablet 0     Sig: TAKE ONE TABLET BY MOUTH ONE TIME DAILY       Beta-Blockers Protocol Failed - 8/23/2023 11:13 AM        Failed - Blood pressure under 140/90 in past 12 months     BP Readings from Last 3 Encounters:   05/04/22 128/68   04/27/22 122/80   08/17/21 122/82                 Failed - Recent (12 mo) or future (30 days) visit within the authorizing provider's specialty     Patient has had an office visit with the authorizing provider or a provider within the authorizing providers department within the previous 12 mos or has a future within next 30 days. See \"Patient Info\" tab in inbasket, or \"Choose Columns\" in Meds & Orders section of the refill encounter.              Passed - Patient is age 6 or older        Passed - Medication is active on med list             Lydia Angela RN 08/23/23 1:43 PM  "

## 2023-09-12 ENCOUNTER — PATIENT OUTREACH (OUTPATIENT)
Dept: CARE COORDINATION | Facility: CLINIC | Age: 63
End: 2023-09-12

## 2023-09-12 ENCOUNTER — NURSE TRIAGE (OUTPATIENT)
Dept: NURSING | Facility: CLINIC | Age: 63
End: 2023-09-12

## 2023-09-12 ENCOUNTER — OFFICE VISIT (OUTPATIENT)
Dept: INTERNAL MEDICINE | Facility: CLINIC | Age: 63
End: 2023-09-12
Payer: COMMERCIAL

## 2023-09-12 VITALS
RESPIRATION RATE: 24 BRPM | OXYGEN SATURATION: 98 % | HEART RATE: 92 BPM | HEIGHT: 72 IN | DIASTOLIC BLOOD PRESSURE: 78 MMHG | SYSTOLIC BLOOD PRESSURE: 110 MMHG | TEMPERATURE: 97.8 F | BODY MASS INDEX: 22.48 KG/M2 | WEIGHT: 166 LBS

## 2023-09-12 DIAGNOSIS — I10 ESSENTIAL HYPERTENSION, BENIGN: ICD-10-CM

## 2023-09-12 DIAGNOSIS — E78.2 MIXED HYPERLIPIDEMIA: ICD-10-CM

## 2023-09-12 DIAGNOSIS — D69.6 THROMBOCYTOPENIA (H): ICD-10-CM

## 2023-09-12 DIAGNOSIS — R63.4 LOSS OF WEIGHT: Primary | ICD-10-CM

## 2023-09-12 DIAGNOSIS — I48.19 PERSISTENT ATRIAL FIBRILLATION (H): ICD-10-CM

## 2023-09-12 DIAGNOSIS — R41.82 ALTERED MENTAL STATUS, UNSPECIFIED ALTERED MENTAL STATUS TYPE: ICD-10-CM

## 2023-09-12 DIAGNOSIS — Z91.81 AT HIGH RISK FOR FALLS: ICD-10-CM

## 2023-09-12 DIAGNOSIS — Z72.0 TOBACCO ABUSE: ICD-10-CM

## 2023-09-12 DIAGNOSIS — R73.03 PREDIABETES: ICD-10-CM

## 2023-09-12 LAB
ALBUMIN SERPL BCG-MCNC: 4.2 G/DL (ref 3.5–5.2)
ALBUMIN UR-MCNC: 30 MG/DL
ALP SERPL-CCNC: 56 U/L (ref 40–129)
ALT SERPL W P-5'-P-CCNC: 13 U/L (ref 0–70)
ANION GAP SERPL CALCULATED.3IONS-SCNC: 13 MMOL/L (ref 7–15)
APPEARANCE UR: CLEAR
AST SERPL W P-5'-P-CCNC: 22 U/L (ref 0–45)
BACTERIA #/AREA URNS HPF: ABNORMAL /HPF
BILIRUB SERPL-MCNC: 0.6 MG/DL
BILIRUB UR QL STRIP: ABNORMAL
BUN SERPL-MCNC: 15.8 MG/DL (ref 8–23)
CALCIUM SERPL-MCNC: 9.9 MG/DL (ref 8.8–10.2)
CHLORIDE SERPL-SCNC: 99 MMOL/L (ref 98–107)
CHOLEST SERPL-MCNC: 139 MG/DL
COLOR UR AUTO: YELLOW
CREAT SERPL-MCNC: 1.01 MG/DL (ref 0.67–1.17)
CREAT UR-MCNC: 248 MG/DL
DEPRECATED HCO3 PLAS-SCNC: 24 MMOL/L (ref 22–29)
EGFRCR SERPLBLD CKD-EPI 2021: 84 ML/MIN/1.73M2
ERYTHROCYTE [DISTWIDTH] IN BLOOD BY AUTOMATED COUNT: 14.6 % (ref 10–15)
GLUCOSE SERPL-MCNC: 126 MG/DL (ref 70–99)
GLUCOSE UR STRIP-MCNC: NEGATIVE MG/DL
HBA1C MFR BLD: 5.5 % (ref 0–5.6)
HCT VFR BLD AUTO: 42.4 % (ref 40–53)
HDLC SERPL-MCNC: 57 MG/DL
HGB BLD-MCNC: 14 G/DL (ref 13.3–17.7)
HGB UR QL STRIP: NEGATIVE
INR PPP: 5.4 (ref 0.85–1.15)
KETONES UR STRIP-MCNC: NEGATIVE MG/DL
LDLC SERPL CALC-MCNC: 63 MG/DL
LEUKOCYTE ESTERASE UR QL STRIP: NEGATIVE
MCH RBC QN AUTO: 33.8 PG (ref 26.5–33)
MCHC RBC AUTO-ENTMCNC: 33 G/DL (ref 31.5–36.5)
MCV RBC AUTO: 102 FL (ref 78–100)
MUCOUS THREADS #/AREA URNS LPF: PRESENT /LPF
NITRATE UR QL: NEGATIVE
NONHDLC SERPL-MCNC: 82 MG/DL
PH UR STRIP: 6 [PH] (ref 5–8)
PLATELET # BLD AUTO: 146 10E3/UL (ref 150–450)
POTASSIUM SERPL-SCNC: 4.5 MMOL/L (ref 3.4–5.3)
PROT SERPL-MCNC: 7.9 G/DL (ref 6.4–8.3)
RBC # BLD AUTO: 4.14 10E6/UL (ref 4.4–5.9)
RBC #/AREA URNS AUTO: ABNORMAL /HPF
SODIUM SERPL-SCNC: 136 MMOL/L (ref 136–145)
SP GR UR STRIP: >=1.03 (ref 1–1.03)
TRIGL SERPL-MCNC: 97 MG/DL
TSH SERPL DL<=0.005 MIU/L-ACNC: 4.13 UIU/ML (ref 0.3–4.2)
UROBILINOGEN UR STRIP-ACNC: 0.2 E.U./DL
VIT B12 SERPL-MCNC: 528 PG/ML (ref 232–1245)
WBC # BLD AUTO: 5.3 10E3/UL (ref 4–11)
WBC #/AREA URNS AUTO: ABNORMAL /HPF

## 2023-09-12 PROCEDURE — G0480 DRUG TEST DEF 1-7 CLASSES: HCPCS | Mod: 59 | Performed by: INTERNAL MEDICINE

## 2023-09-12 PROCEDURE — 80061 LIPID PANEL: CPT | Performed by: INTERNAL MEDICINE

## 2023-09-12 PROCEDURE — 90472 IMMUNIZATION ADMIN EACH ADD: CPT | Performed by: INTERNAL MEDICINE

## 2023-09-12 PROCEDURE — 85027 COMPLETE CBC AUTOMATED: CPT | Performed by: INTERNAL MEDICINE

## 2023-09-12 PROCEDURE — 90471 IMMUNIZATION ADMIN: CPT | Performed by: INTERNAL MEDICINE

## 2023-09-12 PROCEDURE — 90682 RIV4 VACC RECOMBINANT DNA IM: CPT | Performed by: INTERNAL MEDICINE

## 2023-09-12 PROCEDURE — 81001 URINALYSIS AUTO W/SCOPE: CPT | Performed by: INTERNAL MEDICINE

## 2023-09-12 PROCEDURE — 83036 HEMOGLOBIN GLYCOSYLATED A1C: CPT | Performed by: INTERNAL MEDICINE

## 2023-09-12 PROCEDURE — 90677 PCV20 VACCINE IM: CPT | Performed by: INTERNAL MEDICINE

## 2023-09-12 PROCEDURE — 99000 SPECIMEN HANDLING OFFICE-LAB: CPT | Performed by: INTERNAL MEDICINE

## 2023-09-12 PROCEDURE — 85610 PROTHROMBIN TIME: CPT | Performed by: INTERNAL MEDICINE

## 2023-09-12 PROCEDURE — 84443 ASSAY THYROID STIM HORMONE: CPT | Performed by: INTERNAL MEDICINE

## 2023-09-12 PROCEDURE — 99214 OFFICE O/P EST MOD 30 MIN: CPT | Mod: 25 | Performed by: INTERNAL MEDICINE

## 2023-09-12 PROCEDURE — 36415 COLL VENOUS BLD VENIPUNCTURE: CPT | Performed by: INTERNAL MEDICINE

## 2023-09-12 PROCEDURE — 82607 VITAMIN B-12: CPT | Performed by: INTERNAL MEDICINE

## 2023-09-12 PROCEDURE — 80053 COMPREHEN METABOLIC PANEL: CPT | Performed by: INTERNAL MEDICINE

## 2023-09-12 PROCEDURE — 84425 ASSAY OF VITAMIN B-1: CPT | Mod: 90 | Performed by: INTERNAL MEDICINE

## 2023-09-12 RX ORDER — METOPROLOL SUCCINATE 200 MG/1
200 TABLET, EXTENDED RELEASE ORAL DAILY
Qty: 90 TABLET | Refills: 0 | Status: SHIPPED | OUTPATIENT
Start: 2023-09-12 | End: 2024-04-16

## 2023-09-12 RX ORDER — DILTIAZEM HYDROCHLORIDE 120 MG/1
120 CAPSULE, COATED, EXTENDED RELEASE ORAL DAILY
Qty: 90 CAPSULE | Refills: 1 | Status: SHIPPED | OUTPATIENT
Start: 2023-09-12

## 2023-09-12 RX ORDER — ROSUVASTATIN CALCIUM 10 MG/1
10 TABLET, COATED ORAL AT BEDTIME
Qty: 90 TABLET | Refills: 0 | Status: SHIPPED | OUTPATIENT
Start: 2023-09-12 | End: 2024-01-16

## 2023-09-12 RX ORDER — LISINOPRIL 5 MG/1
5 TABLET ORAL DAILY
Qty: 90 TABLET | Refills: 1 | Status: CANCELLED | OUTPATIENT
Start: 2023-09-12

## 2023-09-12 ASSESSMENT — ENCOUNTER SYMPTOMS: DIZZINESS: 1

## 2023-09-12 NOTE — LETTER
September 15, 2023      Damien Mcclendon  [address]  [place] [address]        Dear ,    We are writing to inform you of your test results.        Resulted Orders   HEMOGLOBIN A1C   Result Value Ref Range    Hemoglobin A1C 5.5 0.0 - 5.6 %      Comment:      Normal <5.7%   Prediabetes 5.7-6.4%    Diabetes 6.5% or higher     Note: Adopted from ADA consensus guidelines.   Lipid panel reflex to direct LDL Non-fasting   Result Value Ref Range    Cholesterol 139 <200 mg/dL    Triglycerides 97 <150 mg/dL    Direct Measure HDL 57 >=40 mg/dL    LDL Cholesterol Calculated 63 <=100 mg/dL    Non HDL Cholesterol 82 <130 mg/dL    Narrative    Cholesterol  Desirable:  <200 mg/dL    Triglycerides  Normal:  Less than 150 mg/dL  Borderline High:  150-199 mg/dL  High:  200-499 mg/dL  Very High:  Greater than or equal to 500 mg/dL    Direct Measure HDL  Female:  Greater than or equal to 50 mg/dL   Male:  Greater than or equal to 40 mg/dL    LDL Cholesterol  Desirable:  <100mg/dL  Above Desirable:  100-129 mg/dL   Borderline High:  130-159 mg/dL   High:  160-189 mg/dL   Very High:  >= 190 mg/dL    Non HDL Cholesterol  Desirable:  130 mg/dL  Above Desirable:  130-159 mg/dL  Borderline High:  160-189 mg/dL  High:  190-219 mg/dL  Very High:  Greater than or equal to 220 mg/dL   CBC with platelets   Result Value Ref Range    WBC Count 5.3 4.0 - 11.0 10e3/uL    RBC Count 4.14 (L) 4.40 - 5.90 10e6/uL    Hemoglobin 14.0 13.3 - 17.7 g/dL    Hematocrit 42.4 40.0 - 53.0 %     (H) 78 - 100 fL    MCH 33.8 (H) 26.5 - 33.0 pg    MCHC 33.0 31.5 - 36.5 g/dL    RDW 14.6 10.0 - 15.0 %    Platelet Count 146 (L) 150 - 450 10e3/uL   Comprehensive metabolic panel (BMP + Alb, Alk Phos, ALT, AST, Total. Bili, TP)   Result Value Ref Range    Sodium 136 136 - 145 mmol/L    Potassium 4.5 3.4 - 5.3 mmol/L    Chloride 99 98 - 107 mmol/L    Carbon Dioxide (CO2) 24 22 - 29 mmol/L    Anion Gap 13 7 - 15 mmol/L    Urea Nitrogen 15.8 8.0 - 23.0  mg/dL    Creatinine 1.01 0.67 - 1.17 mg/dL    Calcium 9.9 8.8 - 10.2 mg/dL    Glucose 126 (H) 70 - 99 mg/dL    Alkaline Phosphatase 56 40 - 129 U/L    AST 22 0 - 45 U/L      Comment:      Reference intervals for this test were updated on 6/12/2023 to more accurately reflect our healthy population. There may be differences in the flagging of prior results with similar values performed with this method. Interpretation of those prior results can be made in the context of the updated reference intervals.    ALT 13 0 - 70 U/L      Comment:      Reference intervals for this test were updated on 6/12/2023 to more accurately reflect our healthy population. There may be differences in the flagging of prior results with similar values performed with this method. Interpretation of those prior results can be made in the context of the updated reference intervals.      Protein Total 7.9 6.4 - 8.3 g/dL    Albumin 4.2 3.5 - 5.2 g/dL    Bilirubin Total 0.6 <=1.2 mg/dL    GFR Estimate 84 >60 mL/min/1.73m2   UA Macroscopic with reflex to Microscopic and Culture - Lab Collect   Result Value Ref Range    Color Urine Yellow Colorless, Straw, Light Yellow, Yellow    Appearance Urine Clear Clear    Glucose Urine Negative Negative mg/dL    Bilirubin Urine Small (A) Negative    Ketones Urine Negative Negative mg/dL    Specific Gravity Urine >=1.030 1.005 - 1.030    Blood Urine Negative Negative    pH Urine 6.0 5.0 - 8.0    Protein Albumin Urine 30 (A) Negative mg/dL    Urobilinogen Urine 0.2 0.2, 1.0 E.U./dL    Nitrite Urine Negative Negative    Leukocyte Esterase Urine Negative Negative   Vitamin B12   Result Value Ref Range    Vitamin B12 528 232 - 1,245 pg/mL   TSH with free T4 reflex   Result Value Ref Range    TSH 4.13 0.30 - 4.20 uIU/mL   Vitamin B1 whole blood   Result Value Ref Range    Vitamin B1 Whole Blood Level 107 70 - 180 nmol/L      Comment:      INTERPRETIVE INFORMATION: Vitamin B1, Whole Blood    This assay measures the  concentration of thiamine   diphosphate (TDP), the primary active form of vitamin B1.   Approximately 90 percent of vitamin B1 present in whole   blood is TDP. Thiamine and thiamine monophosphate, which   comprise the remaining 10 percent, are not measured.    This test was developed and its performance characteristics   determined by [facility]. It has not been cleared or   approved by the US Food and Drug Administration. This test   was performed in a CLIA certified laboratory and is   intended for clinical purposes.  Performed By: [facility]  [address] [place] [id number]  : Berny Davis MD, PhD  CLIA Number: [id number]   Urine Drug Confirmation Panel    Narrative    Interpretation:  Absent or none detected  This test was developed and its performance characteristics determined by the [facility],  Special [facility] Laboratory. It has not been cleared or approved by the FDA. The laboratory is regulated under CLIA as qualified to perform high-complexity testing. This test is used for clinical purposes. It should not be regarded as investigational or for research.    Drugs with concentrations less than the cutoff will not be reported.  The drugs with applicable detection cutoff limits that are included within the Drug Confirmation Panel are:    The following drugs are detected with a cutoff of 3 ng/ml: FENTANYL    The following drugs are detected with a cutoff of 5 ng/mL: 6-ACETYLMORPHINE, BUPRENORPHINE, NALOXONE, NORBUPRENORPHINE, NORFENTANYL    The following drugs are detected with a cutoff of 10 ng/mL: SUFENTANIL    The following drugs are detected with a cutoff of 20 ng/mL: PCP (PHENCYCLIDINE)    The following drugs are detected with a cutoff of 50 ng/mL: 7-AMINOCLONAZEPAM, 7-AMINOFLUNITRAZEPAM, ALPRAZOLAM, AMPHETAMINE, A-OH-ALPRAZOLAM, A-OH-MIDAZOLAM, A-OH-TRIAZOLAM, BENZOYLECGONINE (Cocaine Metabolite), CLONAZEPAM, COCAETHYLENE  (Cocaine Metabolite), CODEINE, DIAZEPAM, DIHYDROCODEINE, EDDP (Methadone Metabolite), HYDROCODONE, HYDROMORPHONE, LORAZEPAM, MDA (3,4-Methylenedioxyamphetamine), MDEA (3,2-Jubnqwqibqhvaw-L-ethylcathinone), MDMA (Methylenedioxyamphetamine,Ecstasy), MEPERIDINE, METHADONE, METHAMPHETAMINE, METHYLPHENIDATE (Ritalin), MORPHINE, NALTREXONE, N-DESMETH-TAPENTADOL, NORCODEINE, NORDIAZEPAM, NORMEPERIDINE, O-KASIA-TRAMADOL, OXAZEPAM, OXYCODONE, OXYMORPHONE, PROPOXYPHENE, RITALINIC ACID, TAPENTADOL, TEMAZEPAM, THEBAINE, TRAMADOL    The following drugs are detected with a cutoff of 100 ng/mL: GABAPENTIN, KETAMINE    The following drugs are detected with a cutoff of 200 ng/mL: PREGABALIN     Urine Creatinine for Drug Screen Panel   Result Value Ref Range    Creatinine Urine for Drug Screen 248 mg/dL      Comment:      The reference range has not been established for creatinine in random urines. The results should be integrated into the clinical context for interpretation.   UA Microscopic with Reflex to Culture   Result Value Ref Range    Bacteria Urine Few (A) None Seen /HPF    RBC Urine None Seen 0-2 /HPF /HPF    WBC Urine 0-5 0-5 /HPF /HPF    Mucus Urine Present (A) None Seen /LPF    Narrative    Urine Culture not indicated       If you have any questions or concerns, please call the clinic at the number listed above.       Sincerely,      Ronald Webb MD

## 2023-09-12 NOTE — LETTER
[facility] CARE COORDINATION  [facility]  September 12, 2023    Damien Mcclendon  [address]  [place] [address]      Dear Damien,        I am a  clinic care coordinator who works with ANTHONY COX MD with the [facility]. I wanted to thank you for spending the time to talk with me.  Below is a description of clinic care coordination and how I can further assist you.       The clinic care coordination team is made up of a registered nurse, , financial resource worker and community health worker who understand the health care system. The goal of clinic care coordination is to help you manage your health and improve access to the health care system. Our team works alongside your provider to assist you in determining your health and social needs. We can help you obtain health care and community resources, providing you with necessary information and education. We can work with you through any barriers and develop a care plan that helps coordinate and strengthen the communication between you and your care team.  Our services are voluntary and are offered without charge to you personally.    Please feel free to contact me with any questions or concerns regarding care coordination and what we can offer.      We are focused on providing you with the highest-quality healthcare experience possible.    Sincerely,     Anisa Nunes  Community Health Worker   [facility] Care Coordination  [place], [place] [facility]   [email or web address]  Office: [phone]

## 2023-09-12 NOTE — PROGRESS NOTES
1. Abnormal Weight Loss  I am concerned about patient.  I think that he is probably utilizing some substances and or alcohol more than what he admits to.  We will try to get care coordination involved.  Urine drug screen today.  Labs as below.  See me back soon.  - Primary Care - Care Coordination Referral; Future  - Drug Confirmation Panel Urine with Creat - lab collect; Future  - CBC with platelets; Future  - Comprehensive metabolic panel (BMP + Alb, Alk Phos, ALT, AST, Total. Bili, TP); Future  - UA Macroscopic with reflex to Microscopic and Culture - Lab Collect; Future  - TSH with free T4 reflex; Future    2. Altered mental status, unspecified altered mental status type  Concerning.  He tells me he has not had alcohol in over a week but I am not sure how reliable he is.  I am very concerned about his status.  - Drug Confirmation Panel Urine with Creat - lab collect; Future  - Vitamin B12; Future  - Vitamin B1 whole blood; Future    3. Persistent atrial fibrillation (H)  INR today for monitoring.  He needs follow-up with his cardiologist.  He is noting lightheadedness and dizziness.  Discontinue lisinopril at this point.  Follow-up with cardiology soon.  - metoprolol succinate ER (TOPROL XL) 200 MG 24 hr tablet; Take 1 tablet (200 mg) by mouth daily  Dispense: 90 tablet; Refill: 0  - diltiazem ER COATED BEADS (CARDIZEM CD/CARTIA XT) 120 MG 24 hr capsule; Take 1 capsule (120 mg) by mouth daily  Dispense: 90 capsule; Refill: 1  - INR; Future  - Adult Cardiology Eval [facility] Referral; Future  - INR; Future    4. Essential hypertension, benign  As above, discontinue the lisinopril at this point.    5. Tobacco abuse  He should have lung cancer screening as previously recommended.    6. Mixed hyperlipidemia  Lipids for monitoring.  - Lipid panel reflex to direct LDL Non-fasting; Future  - rosuvastatin (CRESTOR) 10 MG tablet; Take 1 tablet (10 mg) by mouth At Bedtime  Dispense: 90 tablet; Refill: 0    7.  Prediabetes  Blood sugar today for monitoring.  - HEMOGLOBIN A1C; Future    8. At high risk for falls  I am very worried about his falls.  He has a very unsteady and odd gait at this point.  I question if he has some sort of alcohol induced dementia and movement disorder?  - Physical Therapy Referral; Future    9. Thrombocytopenia (H)  Blood counts today for monitoring    Subjective   Damien is a 63 year old, presenting for the following health issues:  Follow Up (Medication follow up up - fasting today ) and Dizziness (Intermittent dizzy spells with positional changes and due to back pains )      9/12/2023    10:09 AM   Additional Questions   Roomed by Claudette Doshi    History of Present Illness       Hyperlipidemia:  He presents for follow up of hyperlipidemia.   He is taking medication to lower cholesterol. He is not having myalgia or other side effects to statin medications.    Hypertension: He presents for follow up of hypertension.  He does not check blood pressure  regularly outside of the clinic. Outpatient blood pressures have not been over 140/90. He does not follow a low salt diet.     He eats 0-1 servings of fruits and vegetables daily.He consumes 0 sweetened beverage(s) daily.He exercises with enough effort to increase his heart rate 10 to 19 minutes per day.  He exercises with enough effort to increase his heart rate 3 or less days per week.   He is taking medications regularly.         Facundo comes in today for next after an extended absence.  I have not seen Facundo in well over a year.  Unfortunate gentleman.  He is currently unemployed.  I think he may have some insurance but I am not sure.  He has a lengthy history including atrial fibrillation.  His last encounter with healthcare was an ER visit where he was intoxicated and falling down back in April.  Prior to that I had seen him around that time after he broke up pelvis.  He tells me he lost his job around that time.  Today he tells  me he is dizzy a lot.  He is a vague and poor historian.  Seems a little confused today.      Review of Systems   Neurological:  Positive for dizziness.            Objective    /78 (BP Location: Left arm, Patient Position: Sitting, Cuff Size: Adult Regular)   Pulse 92   Temp 97.8  F (36.6  C) (Tympanic)   Resp 24   Ht 1.829 m (6')   Wt 75.3 kg (166 lb)   SpO2 98%   BMI 22.51 kg/m    Body mass index is 22.51 kg/m .  Physical Exam       Pale thin unhealthy appearing gentleman.  He does not look well whatsoever.  He is lost a lot of weight.  He has a very unsteady gait.

## 2023-09-12 NOTE — PROGRESS NOTES
"Clinic Care Coordination Contact  Community Health Worker Initial Outreach    CHW Note:  CHW contacted patient regarding a referral that was placed for CCC. CHW introduced self and role of CCC.  Patient shared he is doing \"good\" at this time and declined needing any additional support or resources.   CHW informed patient of CCC Introduction letter that would be sent via mail and encouraged him to reach out to CCC in the future when/if needed. Patient was grateful for the call and agreed to contact [facility] in the future if needed.    Patient accepts CC: No, patient stated he is doing good at this time and declined enrolling in CCC. Patient will be sent Care Coordination introduction letter for future reference.      Order Questions    Question Answer   Reason for Referral: Mental Wellness (Health) (Mental Illness/Chemical Dependency)    Patient/Caregiver Support   Mental Wellness: Chemical Health Assessments   Patient/Caregiver Suport: Home Safety   Clinical Staff have discussed the Care Coordination Referral with the patient and/or caregiver: No         Anisa Nunes  Community Health Worker   [facility] Care Coordination  [facility]   [email or web address]  Office: [phone]    "

## 2023-09-13 ENCOUNTER — ANTICOAGULATION THERAPY VISIT (OUTPATIENT)
Dept: ANTICOAGULATION | Facility: CLINIC | Age: 63
End: 2023-09-13
Payer: COMMERCIAL

## 2023-09-13 DIAGNOSIS — Z79.01 LONG TERM (CURRENT) USE OF ANTICOAGULANTS: ICD-10-CM

## 2023-09-13 DIAGNOSIS — Z86.718 HISTORY OF DVT (DEEP VEIN THROMBOSIS): Primary | Chronic | ICD-10-CM

## 2023-09-13 DIAGNOSIS — I48.19 PERSISTENT ATRIAL FIBRILLATION (H): ICD-10-CM

## 2023-09-13 NOTE — TELEPHONE ENCOUNTER
Critical INR of 5.40 today per  Joelle. Writer contacted pt who reports he has been taking warfarin T, TH, Sa and Sun 5 mg and 2.5 mg the other days of the week. Pt denies any signs of unusual bleeding. Pt reports he ran out of some of his medications so hadn't been taking them.     Writer contacted on call provider Dr. Webb and advised him of the above information. Per Dr. Webb pt should hold warfarin tonight, anticoagulation will contact pt tomorrow.     Pt notified of Dr. Webb advice.    Pt reports he already took his warfarin dose for today. Pt verbalizes understanding to not take anymore warfarin until he speaks to anticoagulation. He will call back if he experiences any unusual bleeding.     Reason for Disposition   Lab or radiology calling with CRITICAL test results    Protocols used: PCP Call - No Triage-A-

## 2023-09-13 NOTE — PROGRESS NOTES
ANTICOAGULATION MANAGEMENT     Damien Mcclendon 63 year old male is on warfarin with supratherapeutic INR result. (Goal INR 2.0-3.0)    Recent labs: (last 7 days)     09/12/23  1110   INR 5.40*       ASSESSMENT     Source(s): Chart Review and Patient/Caregiver Call     Warfarin doses taken: Warfarin taken as instructed   On 9/12/23 per Dr. Webb, to HOLD warfarin dose.  However, patient already had taken his 5mg dose.  Will HOLD warfarin on 9/13/23.  Diet: No new diet changes identified  Medication/supplement changes: None noted  New illness, injury, or hospitalization: Yes:    On 9/12/23 OV with Dr. Webb d/t abnormal WT loss,altered mental status, dizziness, and lightheadedness.   Current  lbs.  Last WT in 4/19/23 was 180 lbs.  (14lbs WT loss in 4-1/2 mos).  Signs or symptoms of bleeding or clotting: No  Previous result: Therapeutic last 6(+) INR visits.   Last INR was 3.0 back in 4/19/23.  Additional findings: Yes.   -  on 9/12/23 Clinical Care Coordination did contact patient, - however,  patient stated he is doing good at this time and declined enrolling in [facility]   - He was encouraged to reach out to [facility] in the future when/if needed.     PLAN     Recommended plan for ongoing change(s) affecting INR     Dosing Instructions: hold dose then decrease your warfarin dose (18.2% change) with next INR in 3 days       Summary  As of 9/13/2023      Full warfarin instructions:  9/13: Hold; Otherwise 5 mg every Tue, Sat; 2.5 mg all other days   Next INR check:  9/15/2023               Telephone call with Damien who verbalizes understanding and agrees to plan    Lab visit scheduled - INR on 9/15/23 @ [facility].    Education provided:   Taking warfarin: Importance of taking warfarin as instructed  Goal range and lab monitoring: goal range and significance of current result  Symptom monitoring: monitoring for bleeding signs and symptoms    Plan made with [facility] Pharmacist Angela Winkler, RN  Anticoagulation  Clinic  9/13/2023    _______________________________________________________________________     Anticoagulation Episode Summary       Current INR goal:  2.0-3.0   TTR:  100.0 % (4.4 mo)   Target end date:  Indefinite   Send INR reminders to:  [facility]    Indications    History of DVT (deep vein thrombosis) [Z86.718]  Long term (current) use of anticoagulants [Z79.01]  Persistent atrial fibrillation (H) [I48.19]             Comments:               Anticoagulation Care Providers       Provider Role Specialty Phone number    Ronald Webb MD Referring Internal Medicine [phone]

## 2023-09-15 ENCOUNTER — LAB (OUTPATIENT)
Dept: LAB | Facility: CLINIC | Age: 63
End: 2023-09-15
Payer: COMMERCIAL

## 2023-09-15 ENCOUNTER — ANTICOAGULATION THERAPY VISIT (OUTPATIENT)
Dept: ANTICOAGULATION | Facility: CLINIC | Age: 63
End: 2023-09-15

## 2023-09-15 DIAGNOSIS — Z79.01 LONG TERM (CURRENT) USE OF ANTICOAGULANTS: ICD-10-CM

## 2023-09-15 DIAGNOSIS — Z86.718 HISTORY OF DVT (DEEP VEIN THROMBOSIS): Primary | Chronic | ICD-10-CM

## 2023-09-15 DIAGNOSIS — I48.19 PERSISTENT ATRIAL FIBRILLATION (H): ICD-10-CM

## 2023-09-15 LAB
INR BLD: 3.1 (ref 0.9–1.1)
VIT B1 PYROPHOSHATE BLD-SCNC: 107 NMOL/L

## 2023-09-15 PROCEDURE — 36416 COLLJ CAPILLARY BLOOD SPEC: CPT

## 2023-09-15 PROCEDURE — 85610 PROTHROMBIN TIME: CPT

## 2023-09-15 NOTE — PROGRESS NOTES
ANTICOAGULATION MANAGEMENT     Damienvneu Mcclendon 63 year old male is on warfarin with supratherapeutic INR result. (Goal INR 2.0-3.0)    Recent labs: (last 7 days)     09/15/23  1530   INR 3.1*       ASSESSMENT     Source(s): Chart Review and Patient/Caregiver Call     Warfarin doses taken: Held for inr of 5.4  recently which may be affecting INR  Diet: No new diet changes identified  Medication/supplement changes: None noted  New illness, injury, or hospitalization: No  Signs or symptoms of bleeding or clotting: No  Previous result: Supratherapeutic  Additional findings:  dose was reduced 18% at last visit so we will continue with that plan for now       PLAN     Recommended plan for no diet, medication or health factor changes affecting INR     Dosing Instructions: Continue your current warfarin dose with next INR in 1 week       Summary  As of 9/15/2023      Full warfarin instructions:  5 mg every Tue, Sat; 2.5 mg all other days   Next INR check:  9/22/2023               Telephone call with Damien who verbalizes understanding and agrees to plan    Lab visit scheduled    Education provided:   Please call back if any changes to your diet, medications or how you've been taking warfarin    Plan made per [facility] anticoagulation protocol    Mal Castaneda RN  Anticoagulation Clinic  9/15/2023    _______________________________________________________________________     Anticoagulation Episode Summary       Current INR goal:  2.0-3.0   TTR:  97.6 % (4.5 mo)   Target end date:  Indefinite   Send INR reminders to:  [facility]    Indications    History of DVT (deep vein thrombosis) [Z86.718]  Long term (current) use of anticoagulants [Z79.01]  Persistent atrial fibrillation (H) [I48.19]             Comments:               Anticoagulation Care Providers       Provider Role Specialty Phone number    Ronald Webb MD Referring Internal Medicine [phone]

## 2023-09-22 NOTE — PROGRESS NOTES
Clinic Care Coordination Contact  Community Health Worker Initial Outreach    CHW Initial Information Gathering:  Referral Source: PCP  Preferred Hospital: [facility]  [phone]  Current living arrangement:: I live in a private home with spouse  Type of residence:: Apartment  Community Resources: None  Supplies Currently Used at Home: None  Equipment Currently Used at Home: walker, standard  Informal Support system:: Family  No PCP office visit in Past Year: No  Transportation means:: Other (taxi)  CHW Additional Questions  MyChart active?: No    CHW Note:  CHW received VM from patient requesting return call. CHW contacted patient back as requested.  Patient shared he has been taking a taxi to and from his medical appointments. Patient shared due to the frequency of his appointments and the cost of the taxi he is looking for more cost effective resources for his transportation needs.  Patient shared he would also like to explore resources for free or low cost food.  CHW scheduled patient with [facility] on 9/26/23 at 11AM to discuss resources for food and transportation.    Patient accepts CC: Yes. Patient scheduled for assessment with [facility] on 9/26/23 at 11AM. Patient noted desire to discuss free food and transportation resources.     Order Questions    Question Answer   Reason for Referral: Mental Wellness (Health) (Mental Illness/Chemical Dependency)    Patient/Caregiver Support   Mental Wellness: Chemical Health Assessments   Patient/Caregiver Suport: Home Safety   Clinical Staff have discussed the Care Coordination Referral with the patient and/or caregiver: No         Anisa Nunes  Community Health Worker   [facility] Care Coordination  [facility]   [email or web address]  Office: [phone]

## 2023-09-25 ENCOUNTER — ANTICOAGULATION THERAPY VISIT (OUTPATIENT)
Dept: BEHAVIORAL HEALTH | Facility: CLINIC | Age: 63
End: 2023-09-25

## 2023-09-25 ENCOUNTER — LAB (OUTPATIENT)
Dept: LAB | Facility: CLINIC | Age: 63
End: 2023-09-25
Payer: COMMERCIAL

## 2023-09-25 ENCOUNTER — HOSPITAL ENCOUNTER (OUTPATIENT)
Dept: CT IMAGING | Facility: HOSPITAL | Age: 63
Discharge: HOME | End: 2023-09-25
Attending: INTERNAL MEDICINE | Admitting: INTERNAL MEDICINE
Payer: COMMERCIAL

## 2023-09-25 DIAGNOSIS — Z86.718 HISTORY OF DVT (DEEP VEIN THROMBOSIS): Primary | Chronic | ICD-10-CM

## 2023-09-25 DIAGNOSIS — Z72.0 TOBACCO ABUSE: ICD-10-CM

## 2023-09-25 DIAGNOSIS — I48.19 PERSISTENT ATRIAL FIBRILLATION (H): ICD-10-CM

## 2023-09-25 DIAGNOSIS — Z79.01 LONG TERM (CURRENT) USE OF ANTICOAGULANTS: ICD-10-CM

## 2023-09-25 LAB — INR BLD: 3.2 (ref 0.9–1.1)

## 2023-09-25 PROCEDURE — 36416 COLLJ CAPILLARY BLOOD SPEC: CPT

## 2023-09-25 PROCEDURE — 85610 PROTHROMBIN TIME: CPT

## 2023-09-25 PROCEDURE — 71271 CT THORAX LUNG CANCER SCR C-: CPT

## 2023-09-25 NOTE — PROGRESS NOTES
ANTICOAGULATION MANAGEMENT     Damien Mcclendon 63 year old male is on warfarin with supratherapeutic INR result. (Goal INR 2.0-3.0)    Recent labs: (last 7 days)     09/25/23  1516   INR 3.2*       ASSESSMENT     Source(s): Chart Review and Patient/Caregiver Call     Warfarin doses taken: Warfarin taken as instructed  Diet: No new diet changes identified  Medication/supplement changes: None noted  New illness, injury, or hospitalization: No  Signs or symptoms of bleeding or clotting: No  Previous result: Supratherapeutic  Additional findings: None       PLAN     Recommended plan for no diet, medication or health factor changes affecting INR     Dosing Instructions: decrease your warfarin dose (11.1% change) with next INR in 1-2 weeks       Summary  As of 9/25/2023      Full warfarin instructions:  5 mg every Sat; 2.5 mg all other days   Next INR check:  10/9/2023               Telephone call with Damien who verbalizes understanding and agrees to plan    Patient offered & declined to schedule next visit    Education provided:   Contact [phone] with any changes, questions or concerns.     Plan made per [facility] anticoagulation protocol    Mal Castaneda RN  Anticoagulation Clinic  9/25/2023    _______________________________________________________________________     Anticoagulation Episode Summary       Current INR goal:  2.0-3.0   TTR:  90.9 % (4.8 mo)   Target end date:  Indefinite   Send INR reminders to:  [facility]    Indications    History of DVT (deep vein thrombosis) [Z86.718]  Long term (current) use of anticoagulants [Z79.01]  Persistent atrial fibrillation (H) [I48.19]             Comments:               Anticoagulation Care Providers       Provider Role Specialty Phone number    Ronald Webb MD Referring Internal Medicine [phone]

## 2023-09-26 ENCOUNTER — PATIENT OUTREACH (OUTPATIENT)
Dept: CARE COORDINATION | Facility: CLINIC | Age: 63
End: 2023-09-26

## 2023-09-26 NOTE — PROGRESS NOTES
Clinic Care Coordination Contact  [facility]/Voicemail       Clinical Data: Care Coordinator Outreach  Outreach attempted x 1.  Left message on patient's voicemail with call back information and requested return call.  Plan: Care Coordinator will try to reach patient again in 3-5 business days.    David Myhre, RN   CCC RN

## 2023-09-29 ENCOUNTER — PATIENT OUTREACH (OUTPATIENT)
Dept: NURSING | Facility: CLINIC | Age: 63
End: 2023-09-29
Payer: COMMERCIAL

## 2023-09-29 ASSESSMENT — ACTIVITIES OF DAILY LIVING (ADL): DEPENDENT_IADLS:: INDEPENDENT;SHOPPING

## 2023-09-29 NOTE — PROGRESS NOTES
Clinic Care Coordination Contact  Clinic Care Coordination Contact  OUTREACH    Referral Information:  Referral Source: PCP    Primary Diagnosis: Psychosocial    Chief Complaint   Patient presents with    Clinic Care Coordination - Initial        Universal Utilization:   Clinic Utilization  Difficulty keeping appointments:: Yes  Compliance Concerns: Yes  No-Show Concerns: No  No PCP office visit in Past Year: No  Utilization      Hospital Admissions  0             ED Visits  0             No Show Count (past year)  10                    Current as of: 9/27/2023  2:35 PM                Clinical Concerns:  Current Medical Concerns:  Patient is a 63 year old man with a history of mixed hyperlipidemia, atrial fibrillation, macrocytosis, thrombocytopenia, tobacco abuse, ED, history of DVT, memory lapses or loss, abnormal weight loss.   Patient stated he is currently taking all of his medications as prescribed. He stated he does have a history of missing appointments, especially INR draws. He is agreeable to receiving a monthly calendar of appointments. Discussed placing the calendar in a prominent place in his home to help remind him of his appointments. Writer also discussed applying for [employer] to provide him for his transportation needs.. Patient agreed to apply for [employer]. Goal around better appointment attendance and getting [employer] were established during today's assessment.   Current Behavioral Concerns: Patient denied any mental health concerns at this time.   Education Provided to patient: Discussed the importance of taking his medications as directed. Encouraged him to attend all scheduled appointments. Encouraged him to complete [employer] application and return to Dr. Mishra to complete. Encouraged him to contact Care Coordination for any needs or concerns.    Pain  Pain (GOAL):: No  Health Maintenance Reviewed: Due/Overdue   Health Maintenance Due   Topic Date Due    ZOSTER  IMMUNIZATION (1 of 2) Never done    NICOTINE/TOBACCO CESSATION COUNSELING Q 1 YR  04/27/2023    YEARLY PREVENTIVE VISIT  04/27/2023    COVID-19 Vaccine (4 - 2023-24 season) 09/01/2023        Medication Management:  Medication review status: Medications reviewed and no changes reported per patient.        Patient manages his medications independently. He stated he is compliant with his medications     Functional Status:  Dependent ADLs:: Independent  Dependent IADLs:: Independent, Shopping  Bed or wheelchair confined:: No  Mobility Status: Independent w/Device  Fallen 2 or more times in the past year?: No  Any fall with injury in the past year?: No    Living Situation:  Current living arrangement:: I live in a private home with spouse  Type of residence:: Apartment    Lifestyle & Psychosocial Needs:    Social Determinants of Health     Food Insecurity: Not on file   Depression: Not at risk (9/12/2023)    PHQ-2     PHQ-2 Score: 0   Housing Stability: Not on file   Tobacco Use: High Risk (9/12/2023)    Patient History     Smoking Tobacco Use: Every Day     Smokeless Tobacco Use: Never     Passive Exposure: Not on file   Financial Resource Strain: Not on file   Alcohol Use: Not on file   Transportation Needs: Not on file   Physical Activity: Not on file   Interpersonal Safety: Not on file   Stress: Not on file   Social Connections: Not on file     Diet:: Regular  Inadequate nutrition (GOAL):: No  Tube Feeding: No  Inadequate activity/exercise (GOAL):: No  Significant changes in sleep pattern (GOAL): No  Transportation means:: Other (Cab)     Worship or spiritual beliefs that impact treatment:: No  Mental health DX:: No  Mental health management concern (GOAL):: No  Chemical Dependency Status: No Current Concerns  Informal Support system:: Family, Spouse        Patient denied any financial concerns at this time.      Resources and Interventions:  Current Resources:      Community Resources: None  Supplies Currently  Used at Home: None  Equipment Currently Used at Home: walker, standard  Employment Status: disabled, retired         Advance Care Plan/Directive  Advanced Care Plans/Directives on file:: No  Advanced Care Plan/Directive Status: Declined Further Information    Referrals Placed: Transportation         Care Plan:  Care Plan: Transportation       Problem: Lack of transportation       Goal: I would like to have [employer] to assist me with my transporation needs.       Start Date: 9/29/2023 Expected End Date: 1/29/2024    This Visit's Progress: 10%    Priority: High    Note:     Barriers: limited transportation options  Strengths: strong advocate for himself.  Patient expressed understanding of goal: yes  Action steps to achieve this goal:  1. I understand the [facility] RN Jacinto will mail an application for [employer] to my address.   2. I will complete all the highlighted sections of the application and will bring to Dr. Webb to complete at my next appointment.  3. I understand a representative from the [facility] who manages [employer] will contact me when approved.   4. I will report progress towards this goal at scheduled outreach telephone calls from my Care Coordination team.                             Care Plan: General       Problem: HP GENERAL PROBLEM       Goal: I would like to be better about attening my appointments.       This Visit's Progress: 10%    Priority: High                            Patient/Caregiver understanding: Verbalized understand of goals and other information discussed at today's assessment.     Outreach Frequency: monthly  Future Appointments                In 2 weeks Sharon Holliday PT M [facility], [facility]    In 2 weeks Ronald Webb MD M [facility]    In 1 month Gildardo Cho MD M [facility] Heart [facility]    In 1 month Ronald Webb MD M [facility]  [facility], [facility]            Plan: CCC RN will continue to monitor, support patient with current goals and will be available to assist as nursing needs arise. CCC CHW will reach to patient on a monthly basis to discuss progression of his goals and will send him monthly calendar of appointments.

## 2023-09-29 NOTE — LETTER
[facility] CARE COORDINATION  [facility]    October 10, 2023    Damien Mcclendon  [address]  [place] [address]      Dear Damien,        I am a  clinic care coordinator who works with ANTHONY COX MD with the [facility]. I wanted to thank you for spending the time to talk with me.  Below is a description of clinic care coordination and how I can further assist you.       The clinic care coordination team is made up of a registered nurse, , financial resource worker and community health worker who understand the health care system. The goal of clinic care coordination is to help you manage your health and improve access to the health care system. Our team works alongside your provider to assist you in determining your health and social needs. We can help you obtain health care and community resources, providing you with necessary information and education. We can work with you through any barriers and develop a care plan that helps coordinate and strengthen the communication between you and your care team.  Our services are voluntary and are offered without charge to you personally.    Please feel free to contact me with any questions or concerns regarding care coordination and what we can offer.      We are focused on providing you with the highest-quality healthcare experience possible.    Sincerely,     David Myhre, RN     Enclosed: I have enclosed a copy of the Patient Centered Plan of Care. This has helpful information and goals that we have talked about. Please keep this in an easy to access place to use as needed.

## 2023-09-29 NOTE — LETTER
[facility]  Patient Centered Plan of Care  About Me:        Patient Name:  Damien Mcclendon    YOB: 1960  Age:         63 year old   Enmanuel MRN:    [id number] Telephone Information:  Home Phone [phone]   Mobile [phone]       Address:  [address]  [place] [address] Email address:  No e-mail address on record      Emergency Contact(s)    Name Relationship Lgl Grd Work Phone Home Phone Mobile Phone   CHIRAG MORRIS Spouse   [phone]           Primary language:  English     needed? No   [employer]:  [phone] op. 1  Other communication barriers:None    Preferred Method of Communication:     Current living arrangement: I live in a private home with spouse    Mobility Status/ Medical Equipment: Independent w/Device        Health Maintenance  Health Maintenance Reviewed: Due/Overdue   Health Maintenance Due   Topic Date Due    ZOSTER IMMUNIZATION (1 of 2) Never done    NICOTINE/TOBACCO CESSATION COUNSELING Q 1 YR  04/27/2023    YEARLY PREVENTIVE VISIT  04/27/2023    COVID-19 Vaccine (4 - 2023-24 season) 09/01/2023          My Access Plan  Medical Emergency 911   Primary Clinic Line [facility] - [phone]   24 Hour Appointment Line [phone] or  [phone]) (toll-free)   24 Hour Nurse Line [phone] (toll-free)   Preferred Urgent Care [facility], [phone]     [facility]  [phone]     Preferred Pharmacy [id number] - [place], [place] - [address]     Behavioral Health Crisis Line The National Suicide Prevention Lifeline at 1-978.275.8297 or Text/Call 878             My Care Team Members  Patient Care Team         Relationship Specialty Notifications Start End    Ronald Webb MD PCP - General   4/4/08     Phone: [phone] Fax: [phone] [address] [facility] [address]    Tracey  Ronald WANG MD Assigned PCP   6/16/21     Phone: [phone] Fax: [phone] [address] [facility] [address]    Anisa Nunes, MA Community Health Worker Primary Care - CC Admissions 9/12/23     Gildardo Cho MD MD Cardiovascular Disease  9/12/23     Phone: [phone] Fax: [phone] [address] [facility] [address] [facility] [address] [facility] [address]              My Care Plans  Self Management and Treatment Plan  Care Plan  Care Plan: Transportation       Problem: Lack of transportation       Goal: I would like to have [employer] to assist me with my transporation needs.       Start Date: 9/29/2023 Expected End Date: 1/29/2024    This Visit's Progress: 10%    Priority: High    Note:     Barriers: limited transportation options  Strengths: strong advocate for himself.  Patient expressed understanding of goal: yes  Action steps to achieve this goal:  1. I understand the [facility] RN Jacinto will mail an application for [employer] to my address.   2. I will complete all the highlighted sections of the application and will bring to Dr. Webb to complete at my next appointment.  3. I understand a representative from the [facility] who manages [employer] will contact me when approved.   4. I will report progress towards this goal at scheduled outreach telephone calls from my Care Coordination team.                             Care Plan: General       Problem: HP GENERAL PROBLEM       Goal: I would like to be better about attening my appointments.       Start Date: 9/23/2023 Expected End Date: 9/22/2024    This Visit's Progress: 10%    Priority: High    Note:     Barriers: difficulty getting to appointments related to limited transportation  Strengths: strong advocate for his wife  Patient expressed understanding of goal: yes  Action steps to achieve this goal:  1. I understand my [facility] team will send me a monthly calendar of appointments.   2. I will place it in a prominent location in my home so I  can see it every day.   3. I will  complete [employer] application, so I have better transportation options to get to my appointments.   4. I will report progress towards this goal at scheduled outreach calls from my Care Coordination team.                                Action Plans on File:                       Advance Care Plans/Directives Type:   No data recorded    My Medical and Care Information  Problem List   Patient Active Problem List   Diagnosis    Tobacco abuse    Mixed hyperlipidemia    Chronic Cutaneous Ulcer Venous Stasis    Atrial Fibrillation    Male Erectile Disorder    Memory Lapses Or Loss    Abnormal Weight Loss    History of DVT (deep vein thrombosis)    Macrocytosis    Prediabetes    Essential hypertension, benign    Thrombocytopenia (H24)    Long term (current) use of anticoagulants    Persistent atrial fibrillation (H)      Current Medications and Allergies:  See printed Medication Report.    Care Coordination Start Date: 9/12/2023   Frequency of Care Coordination: monthly     Form Last Updated: 10/10/2023

## 2023-09-29 NOTE — Clinical Note
PCP - MARIE CHW - please send out monthly calendar of appointments to patient. I have sent his October calendar. Thanks.

## 2023-10-18 ENCOUNTER — TELEPHONE (OUTPATIENT)
Dept: ANTICOAGULATION | Facility: CLINIC | Age: 63
End: 2023-10-18

## 2023-10-18 NOTE — TELEPHONE ENCOUNTER
ANTICOAGULATION     Damien Mcclendon is overdue for an INR check.      Spoke with Damien and scheduled lab appointment on 10/20    Rosa Maria Winkler RN

## 2023-10-23 ENCOUNTER — ANTICOAGULATION THERAPY VISIT (OUTPATIENT)
Dept: ANTICOAGULATION | Facility: CLINIC | Age: 63
End: 2023-10-23

## 2023-10-23 ENCOUNTER — THERAPY VISIT (OUTPATIENT)
Dept: PHYSICAL THERAPY | Facility: REHABILITATION | Age: 63
End: 2023-10-23
Attending: INTERNAL MEDICINE

## 2023-10-23 ENCOUNTER — LAB (OUTPATIENT)
Dept: LAB | Facility: CLINIC | Age: 63
End: 2023-10-23

## 2023-10-23 DIAGNOSIS — Z79.01 LONG TERM (CURRENT) USE OF ANTICOAGULANTS: ICD-10-CM

## 2023-10-23 DIAGNOSIS — R26.89 IMPAIRMENT OF BALANCE: ICD-10-CM

## 2023-10-23 DIAGNOSIS — M62.81 GENERALIZED MUSCLE WEAKNESS: Primary | ICD-10-CM

## 2023-10-23 DIAGNOSIS — Z91.81 AT HIGH RISK FOR FALLS: ICD-10-CM

## 2023-10-23 DIAGNOSIS — I48.19 PERSISTENT ATRIAL FIBRILLATION (H): ICD-10-CM

## 2023-10-23 DIAGNOSIS — Z86.718 HISTORY OF DVT (DEEP VEIN THROMBOSIS): Primary | Chronic | ICD-10-CM

## 2023-10-23 LAB — INR BLD: 2.8 (ref 0.9–1.1)

## 2023-10-23 PROCEDURE — 36416 COLLJ CAPILLARY BLOOD SPEC: CPT

## 2023-10-23 PROCEDURE — 97161 PT EVAL LOW COMPLEX 20 MIN: CPT | Mod: GP

## 2023-10-23 PROCEDURE — 85610 PROTHROMBIN TIME: CPT

## 2023-10-23 PROCEDURE — 97110 THERAPEUTIC EXERCISES: CPT | Mod: GP

## 2023-10-23 NOTE — PROGRESS NOTES
PHYSICAL THERAPY EVALUATION  Type of Visit: Evaluation    See electronic medical record for Abuse and Falls Screening details.    Subjective       Presenting condition or subjective complaint: Improve walking  Pt presents today reporting noting more unsteadiness and difficulty with walking. Wants to improve strength and walk to corner and back. Additionally has some low back pain. Had one fall from cat, otherwise no falls more recent.   Date of onset: 09/12/23    Relevant medical history: Dizziness; DVT (blood clot); Concussions; History of fractures; Implanted device   Dates & types of surgery:      Prior diagnostic imaging/testing results: CT scan; X-ray; Bone scan     Prior therapy history for the same diagnosis, illness or injury: No      Prior Level of Function  Transfers: Independent  Ambulation: Independent  ADL: Independent  IADL:  IND    Living Environment  Social support: With a significant other or spouse   Type of home: Apartment/condo   Stairs to enter the home: Yes   Is there a railing: No   Ramp: No   Stairs inside the home: No   Is there a railing: No   Help at home: None  Equipment owned:       Employment: No Retired  Hobbies/Interests: Bowling, cat, shows    Patient goals for therapy:      Pain assessment: Pain present  Low back soreness      Objective   Cognitive Status Examination  Orientation: Oriented to person, place and time   Level of Consciousness: Alert  Follows Commands and Answers Questions: 100% of the time  Personal Safety and Judgement: Intact  Memory: Intact    OBSERVATION: N/a  INTEGUMENTARY:  scabs on forearms  POSTURE:  Rigid upright posture, flexed hips/trunk   PALPATION: TTP low back   RANGE OF MOTION:  WFL BLE and UE    STRENGTH:  WFL and against gravity demonstrated BLE and UE    BED MOBILITY:  IND    TRANSFERS:  IND    WHEELCHAIR MOBILITY: n/a    GAIT:   Level of [place]:  IND   Assistive Device(s): None  Gait Deviations:  Wide BRAN, shuffling gait, decreased stance time  on LLE, decreased arm swing, slow edgar and step length   Gait Distance: Limited secondary to endurance and fatigue  Stairs: Reciprocal gait with BUE support     BALANCE:  see below     SPECIAL TESTS  Functional Gait Assessment (FGA) TOTAL SCORE: (MAXIMUM SCORE 30): 17    10 Meter Walk Test (Comfortable)     10 Meter Walk Test (Fast)     6 Minute Walk Test (6MWT)           [place] Balance Scale (BBS)     5 Times Sit-to-Stand (5TSTS)  14.8 seconds     Dynamic Gait Index (DGI)     Timed Up and Go (TUG) - sec 14 sec, TUG dual task (cognitive) 14.7 sec   Single Leg Stance Right (sec)    Single Leg Stance Left (sec)    Modified CTSIB Conditions (sec) Cond 1: 30 sec  Cond 2: 30 sec   Cond 4:   Cond 5 :    Romberg  (sec) 30 sec, increased ankle sway   Sharpened Romberg (sec) 30 sec B, increased ankle sway   30 Second Sit to Stand (reps/height)                Assessment & Plan   CLINICAL IMPRESSIONS  Medical Diagnosis: Z91.81 (ICD-10-CM) - At high risk for falls    Treatment Diagnosis: Balance impairment, generalized weakness   Impression/Assessment: Patient is a 63 year old male with high falls risk complaints.  The following significant findings have been identified: Pain, Decreased ROM/flexibility, Impaired balance, Decreased proprioception, Impaired sensation, Impaired gait, Impaired muscle performance, Decreased activity tolerance, Impaired posture, and Instability. These impairments interfere with their ability to perform self care tasks, work tasks, recreational activities, household chores, driving , household mobility, and community mobility as compared to previous level of function. Pt presents with balance and gait unsteadiness that had progressed over the years. Reports feeling generally more unsteady and this impairing confidence with walking. Upon eval pt is demonstrating high risk for falls and BLE weakness, would benefit from skilled PT to decrease falls risk and improve function.     Clinical Decision Making  (Complexity):  Clinical Presentation: Stable/Uncomplicated  Clinical Presentation Rationale: based on medical and personal factors listed in PT evaluation  Clinical Decision Making (Complexity): Low complexity    PLAN OF CARE  Treatment Interventions:  Interventions: Gait Training, Manual Therapy, Neuromuscular Re-education, Therapeutic Activity, Therapeutic Exercise, Self-Care/Home Management    Long Term Goals     PT Goal 1  Goal Identifier: HEP  Goal Description: Patient will be independent in self-management of condition and HEP to reach functional goals.  Target Date: 01/15/24  PT Goal 2  Goal Identifier: FGA  Goal Description: Pt will demonstrate a 4 point improvement on the FGA to demonstrate minimum clinically significant difference in score to demonstrate improved safety with functional mobility and decrease risk of falls.  Rationale: to maximize safety and independence with performance of ADLs and functional tasks;to maximize safety and independence with self cares  Target Date: 01/15/24  PT Goal 3  Goal Identifier: 6 MWT  Goal Description: Pt will increase distance ambulated on 6MWT with LRAD by 120 feet to demonstrate improved endurance with ambulation.  Rationale: to maximize safety and independence with performance of ADLs and functional tasks;to maximize safety and independence with self cares  Target Date: 01/15/24  PT Goal 4  Goal Identifier: 5 x STS  Goal Description: Pt will be able to perform 5xSTS in 12 seconds or less to demonstrate appropriate LE strength for community dwelling adults.  Target Date: 01/15/24  PT Goal 5  Goal Identifier: Walking  Goal Description: Pt will be able to walk down to the corner with LRAD and back without any stumbles and self report of decreased steadiness  Target Date: 01/15/24      Frequency of Treatment: 23  Duration of Treatment: 12 weeks    Recommended Referrals to Other Professionals:   Education Assessment:   Learner/Method: Patient  Education Comments:  Verbalizes understanding    Risks and benefits of evaluation/treatment have been explained.   Patient/Family/caregiver agrees with Plan of Care.     Evaluation Time:     PT Eval, Low Complexity Minutes (05694): 20       Signing Clinician: TAMRA HALL, PT

## 2023-10-23 NOTE — PROGRESS NOTES
ANTICOAGULATION MANAGEMENT     Damien RAMIREZ Hakan 63 year old male is on warfarin with therapeutic INR result. (Goal INR 2.0-3.0)    Recent labs: (last 7 days)     10/23/23  1042   INR 2.8*       ASSESSMENT     Warfarin Lab Questionnaire    Warfarin Doses Last 7 Days      10/23/2023    10:37 AM   Dose in Tablet or Mg   TAB or MG? -  milligram (mg)     Pt Rptd Dose JOSÉ ANTONIO MONDAY TUESDAY WED THURS FRIDAY SATURDAY   10/23/2023  10:37 AM 2.5 2.5 2.5 2.5 2.5 2.5 5         10/23/2023   Warfarin Lab Questionnaire   Missed doses within past 14 days? No - weekly warfarin dose was decreased on 9/25/23.     Changes in diet or alcohol within past 14 days? No   Medication changes since last result? No   Injuries or illness since last result? No   New shortness of breath, severe headaches or sudden changes in vision since last result? No   Abnormal bleeding since last result? No   Upcoming surgery, procedure? No     Previous result: Supratherapeutic last 3 INR results.    Additional findings: None       PLAN     Recommended plan for no diet, medication or health factor changes affecting INR     Dosing Instructions: Continue your current warfarin dose with next INR in 2 weeks       Summary  As of 10/23/2023      Full warfarin instructions:  5 mg every Sat; 2.5 mg all other days   Next INR check:  11/6/2023               Telephone call with Damien who verbalizes understanding and agrees to plan    Lab visit scheduled - INR on 11/8/23 at [facility]    Education provided:   Taking warfarin: Importance of taking warfarin as instructed  Goal range and lab monitoring: goal range and significance of current result    Plan made per [facility] anticoagulation protocol    Rosa Maria Winkler, RN  Anticoagulation Clinic  10/23/2023    _______________________________________________________________________     Anticoagulation Episode Summary       Current INR goal:  2.0-3.0   TTR:  84.3% (5.7 mo)   Target end date:  Indefinite   Send INR  reminders to:  [facility]    Indications    History of DVT (deep vein thrombosis) [Z86.718]  Long term (current) use of anticoagulants [Z79.01]  Persistent atrial fibrillation (H) [I48.19]             Comments:               Anticoagulation Care Providers       Provider Role Specialty Phone number    Ronald Webb MD Referring Internal Medicine [phone]

## 2023-10-25 ENCOUNTER — PATIENT OUTREACH (OUTPATIENT)
Dept: CARE COORDINATION | Facility: CLINIC | Age: 63
End: 2023-10-25

## 2023-10-25 NOTE — PROGRESS NOTES
Clinic Care Coordination Contact  Community Health Worker Follow Up    Care Gaps:     Health Maintenance Due   Topic Date Due    ZOSTER IMMUNIZATION (1 of 2) Never done    RSV VACCINE 60+ (1 - 1-dose 60+ series) Never done    NICOTINE/TOBACCO CESSATION COUNSELING Q 1 YR  04/27/2023    YEARLY PREVENTIVE VISIT  04/27/2023    COVID-19 Vaccine (4 - 2023-24 season) 09/01/2023       Postponed to next PCP appointment.     Care Plan:   Care Plan: Transportation       Problem: Lack of transportation       Goal: I would like to have [employer] to assist me with my transporation needs.       Start Date: 9/29/2023 Expected End Date: 1/29/2024    This Visit's Progress: 10% Recent Progress: 10%    Priority: High    Note:     Barriers: limited transportation options  Strengths: strong advocate for himself.  Patient expressed understanding of goal: yes  Action steps to achieve this goal:  1. I understand the [facility] RN Jacinto will mail an application for [employer] to my address.   2. I will complete all the highlighted sections of the application and will bring to Dr. Webb to complete at my next appointment.  3. I understand a representative from the [facility] who manages [employer] will contact me when approved.   4. I will report progress towards this goal at scheduled outreach telephone calls from my Care Coordination team.     Discussed 10/25/23                            Care Plan: General       Problem: HP GENERAL PROBLEM       Goal: I would like to be better about attening my appointments.       Start Date: 9/23/2023 Expected End Date: 9/22/2024    This Visit's Progress: 20% Recent Progress: 10%    Priority: High    Note:     Barriers: difficulty getting to appointments related to limited transportation  Strengths: strong advocate for his wife  Patient expressed understanding of goal: yes  Action steps to achieve this goal:  1. I understand my [facility] team will send me a monthly calendar of appointments.   2.  "I will place it in a prominent location in my home so I can see it every day.   3. I will  complete [employer] application, so I have better transportation options to get to my appointments.   4. I will report progress towards this goal at scheduled outreach calls from my Care Coordination team.     Discussed 10/25/23                              Intervention and Education during outreach: CHW reviewed patients upcoming appointment with Dr. Webb on 10/30/23 at 2:20PM. CHW will send patient a copy of [employer] application and a list of his upcoming medical appointments. CHW gave patient contact information and encouraged patient to reach out with any questions or concerns.    CHW Note:  CHW contacted patient to review/update CCC goals.  Patient reported he has been doing \"good\" since last [facility] outreach.   Patient shared he has not received the [employer] application via mail. CHW will send patient a copy of [employer] application and follow up and next [facility] outreach.  Patient reported he has been attending his medical appointments and taking his prescription medications as directed. Patient shared he received a list of his upcoming medical appointments which has been a helpful reminder. CHW will send patient an updated list of his upcoming medical appointments.  CHW reviewed patients upcoming appointment with Dr. Webb on 10/30/23 at 2:20PM.  Patient denied any other needs or concerns at this time but will reach out to [facility] as needed.     CHW Plan: CHW will continue to support patient with goals through routine scheduled outreach. CHW will outreach in one month on 11/22/23.      Anisa Nunes  Community Health Worker   [facility] Care Coordination  [facility]   [email or web address]  Office: [phone]    "

## 2023-10-30 ENCOUNTER — OFFICE VISIT (OUTPATIENT)
Dept: INTERNAL MEDICINE | Facility: CLINIC | Age: 63
End: 2023-10-30

## 2023-10-30 ENCOUNTER — ANTICOAGULATION THERAPY VISIT (OUTPATIENT)
Dept: ANTICOAGULATION | Facility: CLINIC | Age: 63
End: 2023-10-30

## 2023-10-30 VITALS
BODY MASS INDEX: 22.35 KG/M2 | SYSTOLIC BLOOD PRESSURE: 104 MMHG | TEMPERATURE: 98.1 F | DIASTOLIC BLOOD PRESSURE: 70 MMHG | HEART RATE: 71 BPM | HEIGHT: 72 IN | RESPIRATION RATE: 12 BRPM | WEIGHT: 165 LBS | OXYGEN SATURATION: 97 %

## 2023-10-30 DIAGNOSIS — D69.6 THROMBOCYTOPENIA (H): ICD-10-CM

## 2023-10-30 DIAGNOSIS — Z79.01 LONG TERM (CURRENT) USE OF ANTICOAGULANTS: ICD-10-CM

## 2023-10-30 DIAGNOSIS — Z86.718 HISTORY OF DVT (DEEP VEIN THROMBOSIS): Primary | Chronic | ICD-10-CM

## 2023-10-30 DIAGNOSIS — I48.19 PERSISTENT ATRIAL FIBRILLATION (H): ICD-10-CM

## 2023-10-30 DIAGNOSIS — Z72.0 TOBACCO ABUSE: ICD-10-CM

## 2023-10-30 DIAGNOSIS — I10 ESSENTIAL HYPERTENSION, BENIGN: ICD-10-CM

## 2023-10-30 DIAGNOSIS — R63.4 LOSS OF WEIGHT: Primary | ICD-10-CM

## 2023-10-30 DIAGNOSIS — K08.9 POOR DENTITION: ICD-10-CM

## 2023-10-30 LAB — INR BLD: 1.9 (ref 0.9–1.1)

## 2023-10-30 PROCEDURE — 91320 SARSCV2 VAC 30MCG TRS-SUC IM: CPT | Performed by: INTERNAL MEDICINE

## 2023-10-30 PROCEDURE — 99214 OFFICE O/P EST MOD 30 MIN: CPT | Performed by: INTERNAL MEDICINE

## 2023-10-30 PROCEDURE — 36416 COLLJ CAPILLARY BLOOD SPEC: CPT | Performed by: INTERNAL MEDICINE

## 2023-10-30 PROCEDURE — 90480 ADMN SARSCOV2 VAC 1/ONLY CMP: CPT | Performed by: INTERNAL MEDICINE

## 2023-10-30 PROCEDURE — 85610 PROTHROMBIN TIME: CPT | Performed by: INTERNAL MEDICINE

## 2023-10-30 NOTE — PROGRESS NOTES
1. Abnormal Weight Loss  This is tapered off.  I have urged him to increase his intake.  Labs for 6 unrevealing last visit.  I will be seeing him back for physical in a few weeks.    2. Persistent atrial fibrillation (H)  INR today.    3. Essential hypertension, benign  Blood pressure is still on the lower side off of the lisinopril however, I am hesitant to adjust his diltiazem or metoprolol.  He will be seeing cardiology soon.  Maybe they can cut back 1 or both of these.    4. Tobacco abuse  I have urged smoking cessation    5. Thrombocytopenia (H24)  Stable.    6. Poor dentition  I have urged him to get his teeth taken care of.  This may be affecting his weight.    Padmini Quezada is a 63 year old, presenting for the following health issues:  Weight Loss (1 month recheck + INR )      10/30/2023     2:07 PM   Additional Questions   Roomed by Claudette       History of Present Illness       Reason for visit:  Weight Loss    He eats 2-3 servings of fruits and vegetables daily.He consumes 0 sweetened beverage(s) daily.He exercises with enough effort to increase his heart rate 20 to 29 minutes per day.  He exercises with enough effort to increase his heart rate 4 days per week.   He is taking medications regularly.           Facundo comes in today for follow-up of his multimedical problems.  I was concerned about his abnormal weight loss.  He lost about 20 pounds in the last year.  He was falling down and had odd gait.  We set him up with a bunch of different providers.  He tells me he is feeling okay.  He tells me he is not drinking to excess.  Continues to smoke about a pack a day.  Drug screens were negative last visit.  He has had no falls since I saw him last.  Dentition is poor and he believes some of his eating issues are due to poor dentition.      Review of Systems         Objective    /70 (BP Location: Left arm, Patient Position: Sitting, Cuff Size: Adult Regular)   Pulse 71   Temp 98.1  F (36.7   C) (Tympanic)   Resp 12   Ht 1.829 m (6')   Wt 74.8 kg (165 lb)   SpO2 97%   BMI 22.38 kg/m    Body mass index is 22.38 kg/m .  Physical Exam           Weight is stable at 165 pounds.  No confusion.  Somewhat chronically ill in appearance.  Poor dentition.  Gait fairly stable today.

## 2023-10-30 NOTE — PROGRESS NOTES
ANTICOAGULATION MANAGEMENT     Damien Mcclendon 63 year old male is on warfarin with subtherapeutic INR result. (Goal INR 2.0-3.0)    Recent labs: (last 7 days)     10/30/23  1452   INR 1.9*       ASSESSMENT     Source(s): Chart Review and Patient/Caregiver Call     Warfarin doses taken: Warfarin taken as instructed  Diet: No new diet changes identified  Medication/supplement changes: None noted  New illness, injury, or hospitalization: No  Signs or symptoms of bleeding or clotting: No  Previous result: Therapeutic last visit at 2.8; previously outside of goal range at 3.2  Additional findings: None       PLAN     Recommended plan for no diet, medication or health factor changes affecting INR     Dosing Instructions: Continue your current warfarin dose with next INR in 2 weeks       Summary  As of 10/30/2023      Full warfarin instructions:  5 mg every Sat; 2.5 mg all other days   Next INR check:  11/13/2023               Telephone call with Damien who verbalizes understanding and agrees to plan    Lab visit scheduled - INR on 11/8/23 at Cardiology visit @ [facility].    Education provided:   Taking warfarin: Importance of taking warfarin as instructed  Goal range and lab monitoring: goal range and significance of current result    Plan made per [facility] anticoagulation protocol    Rosa Maria Winkler RN  Anticoagulation Clinic  10/30/2023    _______________________________________________________________________     Anticoagulation Episode Summary       Current INR goal:  2.0-3.0   TTR:  84.5% (6 mo)   Target end date:  Indefinite   Send INR reminders to:  [facility]    Indications    History of DVT (deep vein thrombosis) [Z86.718]  Long term (current) use of anticoagulants [Z79.01]  Persistent atrial fibrillation (H) [I48.19]             Comments:               Anticoagulation Care Providers       Provider Role Specialty Phone number    Ronald Webb MD Referring Internal Medicine [phone]            
PCP

## 2023-11-08 ENCOUNTER — ANTICOAGULATION THERAPY VISIT (OUTPATIENT)
Dept: ANTICOAGULATION | Facility: CLINIC | Age: 63
End: 2023-11-08

## 2023-11-08 ENCOUNTER — OFFICE VISIT (OUTPATIENT)
Dept: CARDIOLOGY | Facility: CLINIC | Age: 63
End: 2023-11-08
Attending: INTERNAL MEDICINE

## 2023-11-08 ENCOUNTER — LAB (OUTPATIENT)
Dept: CARDIOLOGY | Facility: CLINIC | Age: 63
End: 2023-11-08

## 2023-11-08 VITALS
SYSTOLIC BLOOD PRESSURE: 100 MMHG | WEIGHT: 167 LBS | RESPIRATION RATE: 12 BRPM | HEIGHT: 72 IN | DIASTOLIC BLOOD PRESSURE: 64 MMHG | OXYGEN SATURATION: 99 % | HEART RATE: 78 BPM | BODY MASS INDEX: 22.62 KG/M2

## 2023-11-08 DIAGNOSIS — I48.19 PERSISTENT ATRIAL FIBRILLATION (H): ICD-10-CM

## 2023-11-08 DIAGNOSIS — Z86.718 HISTORY OF VENOUS THROMBOEMBOLISM: Primary | ICD-10-CM

## 2023-11-08 DIAGNOSIS — I10 ESSENTIAL HYPERTENSION: ICD-10-CM

## 2023-11-08 DIAGNOSIS — E78.5 HYPERLIPIDEMIA, UNSPECIFIED HYPERLIPIDEMIA TYPE: ICD-10-CM

## 2023-11-08 DIAGNOSIS — Z79.01 LONG TERM (CURRENT) USE OF ANTICOAGULANTS: ICD-10-CM

## 2023-11-08 DIAGNOSIS — Z72.0 TOBACCO ABUSE: ICD-10-CM

## 2023-11-08 DIAGNOSIS — I25.10 CORONARY ARTERY CALCIFICATION SEEN ON CT SCAN: ICD-10-CM

## 2023-11-08 DIAGNOSIS — Z86.718 HISTORY OF DVT (DEEP VEIN THROMBOSIS): Primary | Chronic | ICD-10-CM

## 2023-11-08 LAB — INR POINT OF CARE: 1.3 (ref 0.9–1.1)

## 2023-11-08 PROCEDURE — 36416 COLLJ CAPILLARY BLOOD SPEC: CPT

## 2023-11-08 PROCEDURE — 85610 PROTHROMBIN TIME: CPT

## 2023-11-08 PROCEDURE — 99244 OFF/OP CNSLTJ NEW/EST MOD 40: CPT | Performed by: GENERAL ACUTE CARE HOSPITAL

## 2023-11-08 NOTE — LETTER
11/8/2023    RONALD COX MD  [address] [facility] [address]    RE: Damien Mcclendon       Dear Colleague,     I had the pleasure of seeing Damien Mcclendon in the [facility].  HEART CARE ENCOUNTER NOTE      Thank you, Ronald Greer, for asking the [facility] Heart Care team to see Mr. Damien Mcclendon to establish care for atrial fibrillation.    Assessment/Recommendations   Assessment:    Persistent atrial fibrillation first noted 6/29/2005 with the last documentation of sinus rhythm being on ambulatory cardiac monitoring 6/5/2006. He still reports symptoms. [id number] score is at least 2 (history of venous thromboembolism).  Lightheadedness likely due to the combination of diltiazem and metoprolol.  Mild-to-moderate coronary artery calcification seen on chest CT 9/25/2023.  History of provoked left lower extremity deep vein thrombosis following hip surgery in 2002 then recurrent recurrent left lower extremity deep vein thrombosis and right lower lobe pulmonary embolism 6/29/2005.  Hyperlipidemia.  Tobacco use.    Plan:  We can discontinue diltiazem and continue just metoprolol succinate 200 mg daily to see if this improves his lightheadedness. Ideally we would also assess his ventricular response with a 24-hour Holter monitor but he was not interested in wearing another monitor.  If he notes an increase in palpitations with stopping diltiazem, then we may need to try adding digoxin as an alternative which is less likely to cause low blood pressure and lightheadedness.  Given the duration of persistent atrial fibrillation, any attempt at rhythm control is unlikely to be successful.  A last resort option for rate control would be permanent pacemaker placement followed by atrioventricular cassidy ablation.  We discussed direct oral anticoagulants as an alternative to warfarin. He is content to remain on warfarin.  Continue rosuvastatin 10 mg daily.  He is not interested in  quitting smoking.  Follow-up with me in 1 year.         History of Present Illness   Mr. Damien Mcclendon is a 63 year old male with a significant past history of persistent AF which appears to have been first noted 6/29/2005 with the last documentation of sinus rhythm being on ambulatory cardiac monitoring 6/5/2006, provoked LLE DVT in 2002 following hip surgery with recurrent LLE DVT and RLL PE 6/29/2005, HLD and tobacco use presenting to establish care in general cardiology. He was last seen in our clinic in 2017.    He has never had an attempt at rhythm control to his knowledge. He still feels palpitations and historically had rapid ventricular rates requiring both metoprolol and diltiazem. He has low blood pressure and frequently feels lightheaded when standing. No chest pain/pressure/tightness, shortness of breath at rest or with exertion, pre-syncope, syncope, lower extremity swelling, paroxysmal nocturnal dyspnea (PND), or orthopnea.     His INR is generally stable and he notes no major issues on warfarin. He still smokes 1/2 ppd.       Cardiac Problems and Cardiac Diagnostics     Most Recent Cardiac testing:  ECG dated 4/19/2023 (personaly reviewed and interpreted): atrial fibrillation with ventricular rate 62 bpm, left axis deviation    Holter monitor 11/23/2018 (report reviewed):  Results:    Indication for study: Chronic atrial fibrillation    The predominant rhythm throughout the tracing was atrial fibrillation with an average ventricular response of 97 bpm.  The heart rate response to activities of daily living appears to be elevated with a peak heart rate of 171 bpm and a total of 9 hours with an average heart rate in excess of 100 bpm.  The QRS duration was normal.  The QT interval was normal.  The study demonstrated no significant bradycardia/pauses.    The patient demonstrated rare ventricular ectopy.  Complex ventricular ectopy was not observed.  Sustained ventricular tachycardia was not  observed.    The patient did return a diary.  No cardiac symptoms were reported     Impression:    Abnormal Holter monitor tracing by virtue of the presence of persistent atrial fibrillation throughout the recording interval.    The ventricular response appears to be inadequately controlled.  Additional AV cassidy blockade may be warranted.    No significant ventricular arrhythmia    No profound bradycardia or pauses.     Comment: The recording was for 23 hours and 59 minutes.  The recording quality was adequate.    ECHO 11/23/2018 (report reviewed):     Normal left ventricular size and systolic function.The estimated left ventricular ejection fraction is 55%.    Normal right ventricular size and systolic function. TAPSE is normal,    Left and right atrium are moderately enlarged. Possible PFO    No hemodynamically significant valvular heart abnormalities.    When compared to the previous study dated 11/27/2017, no significant change.    Chest CT 9/25/2023 (report reviewed):   1.  Negative for lung cancer screening purposes.   2.  Mild to moderate coronary artery calcification.     Medications  Allergies   Current Outpatient Medications   Medication Sig Dispense Refill    blood pressure test kit-medium Kit [BLOOD PRESSURE TEST KIT-MEDIUM KIT] Check blood pressure and heart rate once daily. 1 each 0    diltiazem ER COATED BEADS (CARDIZEM CD/CARTIA XT) 120 MG 24 hr capsule Take 1 capsule (120 mg) by mouth daily 90 capsule 1    metoprolol succinate ER (TOPROL XL) 200 MG 24 hr tablet Take 1 tablet (200 mg) by mouth daily 90 tablet 0    rosuvastatin (CRESTOR) 10 MG tablet Take 1 tablet (10 mg) by mouth At Bedtime 90 tablet 0    triamcinolone (KENALOG) 0.1 % external cream [TRIAMCINOLONE (KENALOG) 0.1 % CREAM] Apply two times a day for 2 weeks at a time to affected area of leg and hand. 453.6 g 1    warfarin ANTICOAGULANT (COUMADIN) 5 MG tablet Take 1/2 tablet (2.5mg) to 1 tablet (5mg) by mouth daily, or as directed.   Adjust dose based on INR results. 90 tablet 1      Allergies   Allergen Reactions    Pravastatin Unknown     Memory changes, feels foggy         Physical Examination Review of Systems   /64 (BP Location: Left arm, Patient Position: Sitting, Cuff Size: Adult Large)   Pulse 78   Ht 1.829 m (6')   Wt 75.8 kg (167 lb)   SpO2 99%   BMI 22.65 kg/m    Body mass index is 22.65 kg/m .  Wt Readings from Last 3 Encounters:   11/08/23 75.8 kg (167 lb)   10/30/23 74.8 kg (165 lb)   09/12/23 75.3 kg (166 lb)       General Appearance:   Pleasant male, appears  stated age. no acute distress, normal body habitus   ENT/Mouth: membranes moist, no apparent gingival bleeding.      EYES:  no scleral icterus, normal conjunctivae   Neck: no carotid bruits. No anterior cervical lymphadenopaty   Respiratory:   lungs are clear to auscultation, no rales or wheezing, equal chest wall expansion    Cardiovascular:   Irregularly irregular. Normal first and second heart sounds with no murmurs, rubs, or gallops; the carotid, radial and posterior tibial pulses are intact, Jugular venous pressure normal, no edema bilaterally    Abdomen/GI:  no organomegaly, masses, bruits, or tenderness; bowel sounds are present   Extremities: no cyanosis or clubbing   Skin: no xanthelasma, warm.    Heme/lymph/ Immunology No apparent bleeding noted.   Neurologic: Alert and oriented. normal gait, no tremors     Psychiatric: Pleasant, calm, appropriate affect.    A complete 10 system review of systems was performed and is negative except as mentioned in the HPI/subjective.         Past History   Past Medical History:   Past Medical History:   Diagnosis Date    Arthritis     Atrial fibrillation (H)     Hip fracture (H) 9/27/2015    History of transfusion     Hypertension     Pulmonary embolism (H)     Created by Conversion     PVD (peripheral vascular disease) (H24)     reports leaky blood vessels in left leg       Past Surgical History:   Past Surgical  History:   Procedure Laterality Date    EYE SURGERY  1972    had cataract removed after BB lodged in eye    HERNIA REPAIR  2012    JOINT REPLACEMENT Bilateral 2002    Left hip    JOINT REPLACEMENT  2007    right hip       Family History:   Family History   Problem Relation Age of Onset    Diabetes Mother     Heart Disease Mother     Obesity Mother     Heart Disease Father     Varicose Veins Father     No Known Problems Daughter         Social History:   Social History     Socioeconomic History    Marital status:      Spouse name: Not on file    Number of children: 1    Years of education: Not on file    Highest education level: Not on file   Occupational History    Not on file   Tobacco Use    Smoking status: Every Day     Packs/day: .5     Types: Cigarettes    Smokeless tobacco: Never   Substance and Sexual Activity    Alcohol use: Yes     Alcohol/week: 2.0 standard drinks of alcohol     Comment: Alcoholic Drinks/day: drinks 1-2 times per week.    Drug use: No    Sexual activity: Yes   Other Topics Concern    Not on file   Social History Narrative    Lives with his wife     Social Determinants of Health     Financial Resource Strain: Low Risk  (10/30/2023)    Financial Resource Strain     Within the past 12 months, have you or your family members you live with been unable to get utilities (heat, electricity) when it was really needed?: No   Food Insecurity: Low Risk  (10/30/2023)    Food Insecurity     Within the past 12 months, did you worry that your food would run out before you got money to buy more?: No     Within the past 12 months, did the food you bought just not last and you didn t have money to get more?: No   Transportation Needs: Low Risk  (10/30/2023)    Transportation Needs     Within the past 12 months, has lack of transportation kept you from medical appointments, getting your medicines, non-medical meetings or appointments, work, or from getting things that you need?: No   Physical Activity:  Not on file   Stress: Not on file   Social Connections: Not on file   Interpersonal Safety: Not on file   Housing Stability: Low Risk  (10/30/2023)    Housing Stability     Do you have housing? : Yes     Are you worried about losing your housing?: No              Lab Results    Chemistry/lipid CBC Cardiac Enzymes/BNP/TSH/INR   Lab Results   Component Value Date    CHOL 139 09/12/2023    HDL 57 09/12/2023    LDL 63 09/12/2023    TRIG 97 09/12/2023    CR 1.01 09/12/2023    BUN 15.8 09/12/2023    POTASSIUM 4.5 09/12/2023     09/12/2023    CO2 24 09/12/2023      Lab Results   Component Value Date    WBC 5.3 09/12/2023    HGB 14.0 09/12/2023    HCT 42.4 09/12/2023     (H) 09/12/2023     (L) 09/12/2023    A1C 5.5 09/12/2023     Lab Results   Component Value Date    A1C 5.5 09/12/2023    Lab Results   Component Value Date    TSH 4.13 09/12/2023    INR 1.9 (H) 10/30/2023          Gildardo Cho MD [facility]  Non-Invasive Cardiologist  [facility]  Pager [phone]      Thank you for allowing me to participate in the care of your patient.      Sincerely,     Gildardo Cho MD     [facility]  cc:   Ronald Webb MD  [address] [place] [address]

## 2023-11-08 NOTE — PROGRESS NOTES
ANTICOAGULATION MANAGEMENT     Damienvenu Mcclendon 63 year old male is on warfarin with subtherapeutic INR result. (Goal INR 2.0-3.0)    Recent labs: (last 7 days)     11/08/23  0959   INR 1.3*       ASSESSMENT     Source(s): Chart Review and Patient/Caregiver Call     Warfarin doses taken: Warfarin taken as instructed  Diet: No new diet changes identified  Medication/supplement changes:  Yes.   11/8/23 discontinued Diltiazem and and continue just metoprolol succinate 200 mg daily to see if this improves his lightheadedness   New illness, injury, or hospitalization: Yes:    11/8 /23 Heart Care consultation with Dr. Cho - liborio.   WT loss of 17 lbs in one year.  (4/27/22 - 184 lbs and 11/8/23 167 lbs.)  Signs or symptoms of bleeding or clotting: No  Previous result: Subtherapeutic at 1.9 on 10/30/23.  Additional findings:  INR trended lower.       PLAN     Recommended plan for ongoing change(s) affecting INR     Dosing Instructions: booster dose then increase your warfarin dose (12.5% change) with next INR in 5-7 days       Summary  As of 11/8/2023      Full warfarin instructions:  5 mg every Wed, Sat; 2.5 mg all other days   Next INR check:  11/15/2023               Telephone call with Damien who verbalizes understanding and agrees to plan    Lab visit scheduled - INR on 11/15/23 @ [facility]    Education provided:   Taking warfarin: Importance of taking warfarin as instructed  Goal range and lab monitoring: goal range and significance of current result  Interaction IS NOT anticipated between warfarin and Diltiazem    Plan made per [facility] anticoagulation protocol    Rosa Maria Winkler RN  Anticoagulation Clinic  11/8/2023    _______________________________________________________________________     Anticoagulation Episode Summary       Current INR goal:  2.0-3.0   TTR:  80.5% (6.3 mo)   Target end date:  Indefinite   Send INR reminders to:  PATSY MIDWAY    Indications    History of DVT (deep vein thrombosis)  [Z86.718]  Long term (current) use of anticoagulants [Z79.01]  Persistent atrial fibrillation (H) [I48.19]             Comments:               Anticoagulation Care Providers       Provider Role Specialty Phone number    Ronald Webb MD Referring Internal Medicine [phone]

## 2023-11-08 NOTE — PROGRESS NOTES
HEART CARE ENCOUNTER NOTE      Thank you, Ronald Greer, for asking the [facility] Heart Care team to see Mr. Damien Mcclendon to establish care for atrial fibrillation.    Assessment/Recommendations   Assessment:    Persistent atrial fibrillation first noted 6/29/2005 with the last documentation of sinus rhythm being on ambulatory cardiac monitoring 6/5/2006. He still reports symptoms. [id number] score is at least 2 (history of venous thromboembolism).  Lightheadedness likely due to the combination of diltiazem and metoprolol.  Mild-to-moderate coronary artery calcification seen on chest CT 9/25/2023.  History of provoked left lower extremity deep vein thrombosis following hip surgery in 2002 then recurrent recurrent left lower extremity deep vein thrombosis and right lower lobe pulmonary embolism 6/29/2005.  Hyperlipidemia.  Tobacco use.    Plan:  We can discontinue diltiazem and continue just metoprolol succinate 200 mg daily to see if this improves his lightheadedness. Ideally we would also assess his ventricular response with a 24-hour Holter monitor but he was not interested in wearing another monitor.  If he notes an increase in palpitations with stopping diltiazem, then we may need to try adding digoxin as an alternative which is less likely to cause low blood pressure and lightheadedness.  Given the duration of persistent atrial fibrillation, any attempt at rhythm control is unlikely to be successful.  A last resort option for rate control would be permanent pacemaker placement followed by atrioventricular cassidy ablation.  We discussed direct oral anticoagulants as an alternative to warfarin. He is content to remain on warfarin.  Continue rosuvastatin 10 mg daily.  He is not interested in quitting smoking.  Follow-up with me in 1 year.         History of Present Illness   Mr. Damien Mcclendon is a 63 year old male with a significant past history of persistent AF which appears to have been first  noted 6/29/2005 with the last documentation of sinus rhythm being on ambulatory cardiac monitoring 6/5/2006, provoked LLE DVT in 2002 following hip surgery with recurrent LLE DVT and RLL PE 6/29/2005, HLD and tobacco use presenting to establish care in general cardiology. He was last seen in our clinic in 2017.    He has never had an attempt at rhythm control to his knowledge. He still feels palpitations and historically had rapid ventricular rates requiring both metoprolol and diltiazem. He has low blood pressure and frequently feels lightheaded when standing. No chest pain/pressure/tightness, shortness of breath at rest or with exertion, pre-syncope, syncope, lower extremity swelling, paroxysmal nocturnal dyspnea (PND), or orthopnea.     His INR is generally stable and he notes no major issues on warfarin. He still smokes 1/2 ppd.       Cardiac Problems and Cardiac Diagnostics     Most Recent Cardiac testing:  ECG dated 4/19/2023 (personaly reviewed and interpreted): atrial fibrillation with ventricular rate 62 bpm, left axis deviation    Holter monitor 11/23/2018 (report reviewed):  Results:    Indication for study: Chronic atrial fibrillation    The predominant rhythm throughout the tracing was atrial fibrillation with an average ventricular response of 97 bpm.  The heart rate response to activities of daily living appears to be elevated with a peak heart rate of 171 bpm and a total of 9 hours with an average heart rate in excess of 100 bpm.  The QRS duration was normal.  The QT interval was normal.  The study demonstrated no significant bradycardia/pauses.    The patient demonstrated rare ventricular ectopy.  Complex ventricular ectopy was not observed.  Sustained ventricular tachycardia was not observed.    The patient did return a diary.  No cardiac symptoms were reported     Impression:    Abnormal Holter monitor tracing by virtue of the presence of persistent atrial fibrillation throughout the recording  interval.    The ventricular response appears to be inadequately controlled.  Additional AV cassidy blockade may be warranted.    No significant ventricular arrhythmia    No profound bradycardia or pauses.     Comment: The recording was for 23 hours and 59 minutes.  The recording quality was adequate.    ECHO 11/23/2018 (report reviewed):     Normal left ventricular size and systolic function.The estimated left ventricular ejection fraction is 55%.    Normal right ventricular size and systolic function. TAPSE is normal,    Left and right atrium are moderately enlarged. Possible PFO    No hemodynamically significant valvular heart abnormalities.    When compared to the previous study dated 11/27/2017, no significant change.    Chest CT 9/25/2023 (report reviewed):   1.  Negative for lung cancer screening purposes.   2.  Mild to moderate coronary artery calcification.     Medications  Allergies   Current Outpatient Medications   Medication Sig Dispense Refill    blood pressure test kit-medium Kit [BLOOD PRESSURE TEST KIT-MEDIUM KIT] Check blood pressure and heart rate once daily. 1 each 0    diltiazem ER COATED BEADS (CARDIZEM CD/CARTIA XT) 120 MG 24 hr capsule Take 1 capsule (120 mg) by mouth daily 90 capsule 1    metoprolol succinate ER (TOPROL XL) 200 MG 24 hr tablet Take 1 tablet (200 mg) by mouth daily 90 tablet 0    rosuvastatin (CRESTOR) 10 MG tablet Take 1 tablet (10 mg) by mouth At Bedtime 90 tablet 0    triamcinolone (KENALOG) 0.1 % external cream [TRIAMCINOLONE (KENALOG) 0.1 % CREAM] Apply two times a day for 2 weeks at a time to affected area of leg and hand. 453.6 g 1    warfarin ANTICOAGULANT (COUMADIN) 5 MG tablet Take 1/2 tablet (2.5mg) to 1 tablet (5mg) by mouth daily, or as directed.  Adjust dose based on INR results. 90 tablet 1      Allergies   Allergen Reactions    Pravastatin Unknown     Memory changes, feels foggy         Physical Examination Review of Systems   /64 (BP Location: Left arm,  Patient Position: Sitting, Cuff Size: Adult Large)   Pulse 78   Ht 1.829 m (6')   Wt 75.8 kg (167 lb)   SpO2 99%   BMI 22.65 kg/m    Body mass index is 22.65 kg/m .  Wt Readings from Last 3 Encounters:   11/08/23 75.8 kg (167 lb)   10/30/23 74.8 kg (165 lb)   09/12/23 75.3 kg (166 lb)       General Appearance:   Pleasant male, appears  stated age. no acute distress, normal body habitus   ENT/Mouth: membranes moist, no apparent gingival bleeding.      EYES:  no scleral icterus, normal conjunctivae   Neck: no carotid bruits. No anterior cervical lymphadenopaty   Respiratory:   lungs are clear to auscultation, no rales or wheezing, equal chest wall expansion    Cardiovascular:   Irregularly irregular. Normal first and second heart sounds with no murmurs, rubs, or gallops; the carotid, radial and posterior tibial pulses are intact, Jugular venous pressure normal, no edema bilaterally    Abdomen/GI:  no organomegaly, masses, bruits, or tenderness; bowel sounds are present   Extremities: no cyanosis or clubbing   Skin: no xanthelasma, warm.    Heme/lymph/ Immunology No apparent bleeding noted.   Neurologic: Alert and oriented. normal gait, no tremors     Psychiatric: Pleasant, calm, appropriate affect.    A complete 10 system review of systems was performed and is negative except as mentioned in the HPI/subjective.         Past History   Past Medical History:   Past Medical History:   Diagnosis Date    Arthritis     Atrial fibrillation (H)     Hip fracture (H) 9/27/2015    History of transfusion     Hypertension     Pulmonary embolism (H)     Created by Conversion     PVD (peripheral vascular disease) (H24)     reports leaky blood vessels in left leg       Past Surgical History:   Past Surgical History:   Procedure Laterality Date    EYE SURGERY  1972    had cataract removed after BB lodged in eye    HERNIA REPAIR  2012    JOINT REPLACEMENT Bilateral 2002    Left hip    JOINT REPLACEMENT  2007    right hip        Family History:   Family History   Problem Relation Age of Onset    Diabetes Mother     Heart Disease Mother     Obesity Mother     Heart Disease Father     Varicose Veins Father     No Known Problems Daughter         Social History:   Social History     Socioeconomic History    Marital status:      Spouse name: Not on file    Number of children: 1    Years of education: Not on file    Highest education level: Not on file   Occupational History    Not on file   Tobacco Use    Smoking status: Every Day     Packs/day: .5     Types: Cigarettes    Smokeless tobacco: Never   Substance and Sexual Activity    Alcohol use: Yes     Alcohol/week: 2.0 standard drinks of alcohol     Comment: Alcoholic Drinks/day: drinks 1-2 times per week.    Drug use: No    Sexual activity: Yes   Other Topics Concern    Not on file   Social History Narrative    Lives with his wife     Social Determinants of Health     Financial Resource Strain: Low Risk  (10/30/2023)    Financial Resource Strain     Within the past 12 months, have you or your family members you live with been unable to get utilities (heat, electricity) when it was really needed?: No   Food Insecurity: Low Risk  (10/30/2023)    Food Insecurity     Within the past 12 months, did you worry that your food would run out before you got money to buy more?: No     Within the past 12 months, did the food you bought just not last and you didn t have money to get more?: No   Transportation Needs: Low Risk  (10/30/2023)    Transportation Needs     Within the past 12 months, has lack of transportation kept you from medical appointments, getting your medicines, non-medical meetings or appointments, work, or from getting things that you need?: No   Physical Activity: Not on file   Stress: Not on file   Social Connections: Not on file   Interpersonal Safety: Not on file   Housing Stability: Low Risk  (10/30/2023)    Housing Stability     Do you have housing? : Yes     Are you  worried about losing your housing?: No              Lab Results    Chemistry/lipid CBC Cardiac Enzymes/BNP/TSH/INR   Lab Results   Component Value Date    CHOL 139 09/12/2023    HDL 57 09/12/2023    LDL 63 09/12/2023    TRIG 97 09/12/2023    CR 1.01 09/12/2023    BUN 15.8 09/12/2023    POTASSIUM 4.5 09/12/2023     09/12/2023    CO2 24 09/12/2023      Lab Results   Component Value Date    WBC 5.3 09/12/2023    HGB 14.0 09/12/2023    HCT 42.4 09/12/2023     (H) 09/12/2023     (L) 09/12/2023    A1C 5.5 09/12/2023     Lab Results   Component Value Date    A1C 5.5 09/12/2023    Lab Results   Component Value Date    TSH 4.13 09/12/2023    INR 1.9 (H) 10/30/2023          Gildardo Cho MD [facility]  Non-Invasive Cardiologist  [facility]  Pager [phone]

## 2023-11-08 NOTE — PATIENT INSTRUCTIONS
We can try stopping your diltiazem and just continuing your metoprolol to see if this makes you less lightheaded. If you feel more heart palpitations, we may then need to try a different medication.  If at any point you want to try a new blood thinner such as Xarelto (rivaroxaban 20 mg once a day) or Eliquis (apixaban 5 mg twice a day), let me know.  See me back in 1 year.

## 2023-11-21 NOTE — TELEPHONE ENCOUNTER
November 21, 2023    [facility] Patient Enrollment Form for PT/INR at Home Monitoring Services was picked up from outbox of Dr. Webb.  Paperwork has been reviewed and is complete.  Per initial initial request, this was sent via fax to [phone].     Antonieta Vences

## 2023-11-22 ENCOUNTER — PATIENT OUTREACH (OUTPATIENT)
Dept: CARE COORDINATION | Facility: CLINIC | Age: 63
End: 2023-11-22

## 2023-11-22 ENCOUNTER — TELEPHONE (OUTPATIENT)
Dept: ANTICOAGULATION | Facility: CLINIC | Age: 63
End: 2023-11-22

## 2023-11-22 NOTE — PROGRESS NOTES
Clinic Care Coordination Contact  [facility]/Voicemail    Clinical Data: Care Coordinator Outreach    Outreach Documentation Number of Outreach Attempt   11/22/2023  12:52 PM 1       Left message on patient's voicemail with call back information and requested return call.    Plan: Care Coordinator will try to reach patient again in 10 business days or on 12/6/23.      Anisa Nunes  Community Health Worker   [facility] Care Coordination  [facility]   [email or web address]  Office: [phone]

## 2023-11-22 NOTE — TELEPHONE ENCOUNTER
ANTICOAGULATION     Damien Mcclendon is overdue for an INR check.     Left message for patient to call and schedule lab appointment as soon as possible. If returning call, please schedule.     Rosa Maria Winkler RN

## 2023-11-29 ENCOUNTER — TELEPHONE (OUTPATIENT)
Dept: ANTICOAGULATION | Facility: CLINIC | Age: 63
End: 2023-11-29

## 2023-11-29 NOTE — TELEPHONE ENCOUNTER
ANTICOAGULATION     Damien Mcclendon is overdue for an INR check.     Spoke with Damien and scheduled lab appointment on 12/8    Mal Castaneda RN

## 2023-11-30 ENCOUNTER — PATIENT OUTREACH (OUTPATIENT)
Dept: CARE COORDINATION | Facility: CLINIC | Age: 63
End: 2023-11-30

## 2023-11-30 NOTE — LETTER
[facility] CARE COORDINATION  [facility]    November 30, 2023    Damien Mcclendon  [address]  [place] [address]      Dear Damien,    I have been attempting to reach you since our last contact. I would like to continue to work with you and provide any additional support you may need on achieving your health care related goals. I would appreciate if you would give me a call at [phone] to let me know if you would like to continue working together. I know that there are many things that can affect our ability to communicate and I hope we can continue to work together.    All of us at the [facility] are invested in your health and are here to assist you in meeting your goals.     Sincerely,    David J Myhre, RN

## 2023-11-30 NOTE — PROGRESS NOTES
Clinic Care Coordination Contact  [facility]/Voicemail    Clinical Data: Care Coordinator Outreach        Left message on patient's voicemail with call back information and requested return call.    Plan: Care Coordinator will send unable to contact letter with care coordinator contact information via mail. Care Coordinator will try to reach patient again in 10 business days.    David Myhre, RN

## 2023-12-06 ENCOUNTER — PATIENT OUTREACH (OUTPATIENT)
Dept: CARE COORDINATION | Facility: CLINIC | Age: 63
End: 2023-12-06

## 2023-12-06 NOTE — PROGRESS NOTES
Clinic Care Coordination Contact  Follow Up Progress Note      Assessment: [facility] RN spoke with patient today. She expressed concern about his Medical Assistance being cancelled. He stated he completed the required paperwork in his renewal package before the deadline in September. Writer sent referral to the [facility] FRW to assist him with navigating with his insurance concerns. Patient stated he is paying out-of-pocket her his medication currently.    Patient said he sent in his [employer] application, but has not heard back from the [facility] yet regarding acceptance into the program. Writer provided him with the phone number for the [facility].   Patient denied any other needs or concerns at this time.     Care Gaps:    Health Maintenance Due   Topic Date Due    ZOSTER IMMUNIZATION (1 of 2) Never done    RSV VACCINE (Pregnancy & 60+) (1 - 1-dose 60+ series) Never done    NICOTINE/TOBACCO CESSATION COUNSELING Q 1 YR  04/27/2023    YEARLY PREVENTIVE VISIT  04/27/2023       Scheduled with PCP on 1/2/24      Care Plans  Care Plan: Transportation       Problem: Lack of transportation       Goal: I would like to have [employer] to assist me with my transporation needs.       Start Date: 9/29/2023 Expected End Date: 1/29/2024    This Visit's Progress: 50% Recent Progress: 10%    Priority: High    Note:     Barriers: limited transportation options  Strengths: strong advocate for himself.  Patient expressed understanding of goal: yes  Action steps to achieve this goal:  1. I understand the [facility] RN Jacinto will mail an application for [employer] to my address.   2. I will complete all the highlighted sections of the application and will bring to Dr. Webb to complete at my next appointment.  3. I understand a representative from the [facility] who manages [employer] will contact me when approved.   4. I will report progress towards this goal at scheduled outreach telephone calls from my  Care Coordination team.     Discussed 12/6/23                            Care Plan: General       Problem: HP GENERAL PROBLEM       Goal: I would like to be better about attening my appointments.       Start Date: 9/23/2023 Expected End Date: 9/22/2024    Recent Progress: 20%    Priority: High    Note:     Barriers: difficulty getting to appointments related to limited transportation  Strengths: strong advocate for his wife  Patient expressed understanding of goal: yes  Action steps to achieve this goal:  1. I understand my CCC team will send me a monthly calendar of appointments.   2. I will place it in a prominent location in my home so I can see it every day.   3. I will  complete [employer] application, so I have better transportation options to get to my appointments.   4. I will report progress towards this goal at scheduled outreach calls from my Care Coordination team.     Discussed 12/6/23                            Care Plan: Financial Wellbeing       Problem: Patient expresses financial resource strain       Goal: I would like to have my insurance back in place.       Start Date: 12/6/2023 Expected End Date: 1/5/2024    This Visit's Progress: 10%    Priority: High    Note:     Barriers: insurance stopped   Strengths: strong advocate for himself.   Patient expressed understanding of goal: yes  Action steps to achieve this goal:  1. I understand the [facility] RN Jacinto has sent a referral to the Care Coordination Financial Resources Worker for assistance with navigating the renewal of my insurance.   2. I understand the Financial Resources Worker will contact me directly.   3. I will report progress towards this goal at scheduled outreach telephone calls from my Care Coordination team.                                Intervention/Education provided during outreach: Discussed the importance of taking his medications as directed. Encouraged him to be available for follow up call the FRW regarding his insurance.  Encouraged him to contact Care Coordination for any additional needs or concerns.             Plan: CCC RN will continue  to monitor, support patient with current goals and will be available to assist as nursing needs arise.     Care Coordinator will follow up in one month.

## 2023-12-06 NOTE — LETTER
[facility]  Patient Centered Plan of Care  About Me:        Patient Name:  Damien Quezada,     It was nice to talk to you the other day. I hope you get your insurance back in place real soon. Here is a copy of your Care Plan with the goals we are supporting you with at this time. Please let me know if I can help in any other way.     Thanks,     Dave Myhre, RN  [facility] RN  [phone]    YOB: 1960  Age:         63 year old   Enmanuel MRN:    [id number] Telephone Information:  Home Phone [phone]   Mobile [phone]       Address:  [address] [place], [place] [address]  [place] [address] Email address:  No e-mail address on record      Emergency Contact(s)    Name Relationship Lgl Grd Work Phone Home Phone Mobile Phone   CHIRAG MORRIS Spouse   [phone]           Primary language:  English     needed? No   [place] [facility]:  [phone] op. 1  Other communication barriers:None    Preferred Method of Communication:     Current living arrangement: I live in a private home with spouse    Mobility Status/ Medical Equipment: Independent w/Device        Health Maintenance  Health Maintenance Reviewed: Due/Overdue   Health Maintenance Due   Topic Date Due    ZOSTER IMMUNIZATION (1 of 2) Never done    RSV VACCINE (Pregnancy & 60+) (1 - 1-dose 60+ series) Never done    NICOTINE/TOBACCO CESSATION COUNSELING Q 1 YR  04/27/2023    YEARLY PREVENTIVE VISIT  04/27/2023          My Access Plan  Medical Emergency 911   Primary Clinic Line [facility] - [phone]   24 Hour Appointment Line [phone] or  [phone]) (toll-free)   24 Hour Nurse Line [phone] (toll-free)   Preferred Urgent Care [facility], [phone]     [facility]  [phone]     Preferred Pharmacy [id number] - [place], [place] - [address]      Behavioral Health Crisis Line The  National Suicide Prevention Lifeline at 1-839.607.9574 or Text/Call 988           My Care Team Members  Patient Care Team         Relationship Specialty Notifications Start End    Ronald Webb MD PCP - General   4/4/08     Phone: [phone] Fax: [phone] [address] [facility] [address]    Ronald Webb MD Assigned PCP   6/16/21     Phone: [phone] Fax: [phone] [address] [facility] [address]    Anisa Nunes, W Community Health Worker Primary Care - CC Admissions 9/12/23     Gildardo Cho MD MD Cardiovascular Disease  9/12/23     Phone: [phone] Fax: [phone] [address] [facility] [address] [facility] [address]    Gildardo Cho MD Assigned Heart and Vascular Provider   11/11/23     Phone: [phone] Fax: [phone] [address] [facility] [address] [facility] [address]                My Care Plans  Self Management and Treatment Plan    Care Plan  Care Plan: Transportation       Problem: Lack of transportation       Goal: I would like to have [employer] to assist me with my transporation needs.       Start Date: 9/29/2023 Expected End Date: 1/29/2024    This Visit's Progress: 50% Recent Progress: 10%    Priority: High    Note:     Barriers: limited transportation options  Strengths: strong advocate for himself.  Patient expressed understanding of goal: yes  Action steps to achieve this goal:  1. I understand the [facility] RN Jacinto will mail an application for [employer] to my address.   2. I will complete all the highlighted sections of the application and will bring to Dr. Webb to complete at my next appointment.  3. I understand a representative from the [facility] who manages [employer] will contact me when approved.   4. I will report progress towards this goal at scheduled outreach telephone calls from my Care Coordination team.     Discussed 12/6/23                            Care Plan: General       Problem: HP GENERAL PROBLEM        Goal: I would like to be better about attening my appointments.       Start Date: 9/23/2023 Expected End Date: 9/22/2024    Recent Progress: 20%    Priority: High    Note:     Barriers: difficulty getting to appointments related to limited transportation  Strengths: strong advocate for his wife  Patient expressed understanding of goal: yes  Action steps to achieve this goal:  1. I understand my [facility] team will send me a monthly calendar of appointments.   2. I will place it in a prominent location in my home so I can see it every day.   3. I will  complete [employer] application, so I have better transportation options to get to my appointments.   4. I will report progress towards this goal at scheduled outreach calls from my Care Coordination team.     Discussed 12/6/23                            Care Plan: Financial Wellbeing       Problem: Patient expresses financial resource strain       Goal: I would like to have my insurance back in place.       Start Date: 12/6/2023 Expected End Date: 1/5/2024    This Visit's Progress: 10%    Priority: High    Note:     Barriers: insurance stopped   Strengths: strong advocate for himself.   Patient expressed understanding of goal: yes  Action steps to achieve this goal:  1. I understand the [facility] RN Jacinto has sent a referral to the Care Coordination Financial Resources Worker for assistance with navigating the renewal of my insurance.   2. I understand the Financial Resources Worker will contact me directly.   3. I will report progress towards this goal at scheduled outreach telephone calls from my Care Coordination team.                                Action Plans on File:                       Advance Care Plans/Directives:   Advanced Care Plan/Directives on file:   No    Discussed with patient/caregiver(s): No data recorded           My Medical and Care Information  Problem List   Patient Active Problem List   Diagnosis    Tobacco abuse    Mixed hyperlipidemia     Chronic Cutaneous Ulcer Venous Stasis    Atrial Fibrillation    Male Erectile Disorder    Memory Lapses Or Loss    Abnormal Weight Loss    History of DVT (deep vein thrombosis)    Macrocytosis    Prediabetes    Essential hypertension, benign    Thrombocytopenia (H24)    Long term (current) use of anticoagulants    Persistent atrial fibrillation (H)    Generalized muscle weakness    Impairment of balance      Current Medications and Allergies:  See printed Medication Report.    Care Coordination Start Date: 9/12/2023   Frequency of Care Coordination: monthly, more frequently as needed     Form Last Updated: 12/06/2023

## 2023-12-07 ENCOUNTER — PATIENT OUTREACH (OUTPATIENT)
Dept: CARE COORDINATION | Facility: CLINIC | Age: 63
End: 2023-12-07

## 2023-12-07 ENCOUNTER — TELEPHONE (OUTPATIENT)
Dept: ANTICOAGULATION | Facility: CLINIC | Age: 63
End: 2023-12-07

## 2023-12-07 NOTE — TELEPHONE ENCOUNTER
ANTICOAGULATION     Damien Mcclendon is overdue for an INR check.     Spoke with Damien and scheduled lab appointment on 12/8   - However, he stated will need to cancel INR appt.   - currently has no insurance and is working on getting it back.   - he had been out of his warfarin for a few days and will  soon, to restart.   - advised to recheck INR one week after resuming warfarin.    Rosa Maria Winkler RN

## 2023-12-07 NOTE — PROGRESS NOTES
Clinic Care Coordination Contact  Program:  [facility]: [facility] Case #:  [facility] Worker:   Goldy #:   Subscriber #:   Renewal:  Date Applied:     FRW Outreach:   23: Outreach attempted x 1. Left message on voicemail with call back information and requested return call.  Plan: FRW will call again within one week.     Health Insurance Screening: [employer]   1. Do you currently have health insurance, did you receive a renewal?  2. If you applied through [employer], do you know your username/password?  3. How many people in the household?  4. Do you file taxes, who do you file with?   5. What is your monthly income (include all tax members)?  6. Do you have access to insurance through an employer (if yes, need EIN)?  7. Do you have social security cards and/or green cards?   [facility] Application Screenin. Do you file taxes, who do you file with (need 1040)?  2. What is your monthly income (need 2 recent paystubs)?   3. Do you have a bank account, need recent statement?      Health Insurance:      Referral/Screening:  FRW Screening      Row Name 23 1310       County Benefits   What is the monthly gross income for the household (wages, social security, workers comp, and pension)? 1270       [place]:   [place] No       Is this an insurance renewal? Yes       Is this a new insurance application request? No       OTHER   Is this a [place] care application? Yes       Any other information for the FRW? Patient reported his MA stopped, even though he sent all of his paperwork in September. He needs assistance with navigating how to get his insurance re-instated. He accuring bills he cannot afford.

## 2023-12-08 ENCOUNTER — PATIENT OUTREACH (OUTPATIENT)
Dept: CARE COORDINATION | Facility: CLINIC | Age: 63
End: 2023-12-08

## 2023-12-08 NOTE — PROGRESS NOTES
Clinic Care Coordination Contact  Program:[employer] Renewal  County: [facility] Case #:  [facility] Worker:   Goldy #:   Subscriber #:   Renewal:  Date Applied:     FRW Outreach:   12/8/23 FRW called and patient stated he was very upset with [facility] because he filled out the renewal and sent it in September. FRW understands his frustrations and assisted in calling the county over the phone. Patient was expressing his frustrations over the phone to someone that was with him and FRW tried to ask if this was a good time and he relayed more frustrations . [facility] stated that they did get his renewal in September but it was incomplete it did not have the  projected annual income for him and his wife for 2024 we did update that over the phone and it was missing the both signatures on the signature pages. FRW and [facility] stated that for a faster result he can go down to a [facility] office to sign the renewal signature page and they can see if it qualities to be expedited. Patent was not happy and asked for it to be mailed and it should take about 3/5 days to get to the patient. Patient again stated that FRW and the County worker did not want to help him and that we are just trying to hurt him and put him in debt. FRW explained to the patient that he needed to be respectful to the [facility] work and I due to we are trying to help insure that he gets his health insurance active ASAP. FRW sent message to [facility] RN and [facility] CHW to let him know the status and that he was concerned about getting his medications.   Genesis Lantigua   Financial Resource Worker  [facility]  Clinic Care Coordination  [phone]      12/7/23: Outreach attempted x 1. Left message on voicemail with call back information and requested return call.  Plan: FRW will call again within one week.     Health Insurance Screening: [employer]   1. Do you currently have health insurance, did you receive a renewal?  2. If you applied  through [employer], do you know your username/password?  3. How many people in the household?  4. Do you file taxes, who do you file with?   5. What is your monthly income (include all tax members)?  6. Do you have access to insurance through an employer (if yes, need EIN)?  7. Do you have social security cards and/or green cards?   [place] Care Application Screenin. Do you file taxes, who do you file with (need 1040)?  2. What is your monthly income (need 2 recent paystubs)?   3. Do you have a bank account, need recent statement?      Health Insurance:      Referral/Screening:  FR Screening      Row Name 23 1310       County Benefits   What is the monthly gross income for the household (wages, social security, workers comp, and pension)? 1270       [place]:   [place] No       Is this an insurance renewal? Yes       Is this a new insurance application request? No       OTHER   Is this a philippe care application? Yes       Any other information for the FRW? Patient reported his MA stopped, even though he sent all of his paperwork in September. He needs assistance with navigating how to get his insurance re-instated. He accuring bills he cannot afford.

## 2023-12-19 ENCOUNTER — PATIENT OUTREACH (OUTPATIENT)
Dept: CARE COORDINATION | Facility: CLINIC | Age: 63
End: 2023-12-19

## 2023-12-19 NOTE — PROGRESS NOTES
Clinic Care Coordination Contact  Program:Mnsure Renewal  County: [facility] Case #:  [facility] Worker:   Goldy #:   Subscriber #:   Renewal:  Date Applied:     FRW Outreach:   12/19/23  FRW called patient and left a vm with call back information. FRW will make outreach in one week.  Genesis Lantigua   Financial Resource Worker  [facility] Care Coordination  [phone]   12/8/23 FRW called and patient stated he was very upset with [facility] because he filled out the renewal and sent it in September. FRW understands his frustrations and assisted in calling the [facility] over the phone. Patient was expressing his frustrations over the phone to someone that was with him and FRW tried to ask if this was a good time and he relayed more frustrations . [facility] stated that they did get his renewal in September but it was incomplete it did not have the  projected annual income for him and his wife for 2024 we did update that over the phone and it was missing the both signatures on the signature pages. IVANA and [facility] stated that for a faster result he can go down to a county office to sign the renewal signature page and they can see if it qualities to be expedited. Patent was not happy and asked for it to be mailed and it should take about 3/5 days to get to the patient. Patient again stated that FRW and the [facility] worker did not want to help him and that we are just trying to hurt him and put him in debt. FRW explained to the patient that he needed to be respectful to the [facility] work and I due to we are trying to help insure that he gets his health insurance active ASAP. FRW sent message to CCC RN and [facility] CHW to let him know the status and that he was concerned about getting his medications.   Genesis Lantigua   Financial Resource Worker  [facility] Care Coordination  [phone]      12/7/23: Outreach attempted x 1. Left message on voicemail with call back  information and requested return call.  Plan: FRW will call again within one week.     Health Insurance Screening: [employer]NSURE   1. Do you currently have health insurance, did you receive a renewal?  2. If you applied through [employer], do you know your username/password?  3. How many people in the household?  4. Do you file taxes, who do you file with?   5. What is your monthly income (include all tax members)?  6. Do you have access to insurance through an employer (if yes, need EIN)?  7. Do you have social security cards and/or green cards?   [place] Care Application Screenin. Do you file taxes, who do you file with (need 1040)?  2. What is your monthly income (need 2 recent paystubs)?   3. Do you have a bank account, need recent statement?      Health Insurance:      Referral/Screening:  FRW Screening      Row Name 23 1310       [place] Benefits   What is the monthly gross income for the household (wages, social security, workers comp, and pension)? 1270       [place]:   [place] No       Is this an insurance renewal? Yes       Is this a new insurance application request? No       OTHER   Is this a philippe care application? Yes       Any other information for the FRW? Patient reported his MA stopped, even though he sent all of his paperwork in September. He needs assistance with navigating how to get his insurance re-instated. He accuring bills he cannot afford.

## 2023-12-28 ENCOUNTER — PATIENT OUTREACH (OUTPATIENT)
Dept: CARE COORDINATION | Facility: CLINIC | Age: 63
End: 2023-12-28

## 2023-12-28 NOTE — PROGRESS NOTES
Clinic Care Coordination Contact  Program:Mnsure Renewal  County: [facility] Case #:  County Worker:   Goldy #:   Subscriber #:   Renewal:  Date Applied:     FRW Outreach:   12/28/23 FRW called patient and left a final vm with call back information as attempt x2 with no answer or return phone calls. FRW closed the FRW program and remove patient from panel. Patient can be referred back to FRW if needed.      Genesislorena Lantigua   Financial Resource Worker  [facility] Care Coordination  [phone]    12/19/23  FRW called patient and left a vm with call back information. FRW will make outreach in one week.  Genesis Smith   Financial Resource Worker  [facility] Care Coordination  [phone]   12/8/23 FRW called and patient stated he was very upset with [place] [facility] because he filled out the renewal and sent it in September. FRW understands his frustrations and assisted in calling the county over the phone. Patient was expressing his frustrations over the phone to someone that was with him and FRW tried to ask if this was a good time and he relayed more frustrations . [facility] stated that they did get his renewal in September but it was incomplete it did not have the  projected annual income for him and his wife for 2024 we did update that over the phone and it was missing the both signatures on the signature pages. IVANA and [facility] stated that for a faster result he can go down to a county office to sign the renewal signature page and they can see if it qualities to be expedited. Patent was not happy and asked for it to be mailed and it should take about 3/5 days to get to the patient. Patient again stated that FRW and the County worker did not want to help him and that we are just trying to hurt him and put him in debt. FRW explained to the patient that he needed to be respectful to the [facility] work and I due to we are trying to help insure that he  gets his health insurance active ASAP. FRW sent message to CCC RN and [facility] CHW to let him know the status and that he was concerned about getting his medications.   Genesis Lantigua   Financial Resource Worker  [facility]  Clinic Care Coordination  [phone]      23: Outreach attempted x 1. Left message on voicemail with call back information and requested return call.  Plan: FRW will call again within one week.     Health Insurance Screening: MNSURE   1. Do you currently have health insurance, did you receive a renewal?  2. If you applied through Mnsure, do you know your username/password?  3. How many people in the household?  4. Do you file taxes, who do you file with?   5. What is your monthly income (include all tax members)?  6. Do you have access to insurance through an employer (if yes, need EIN)?  7. Do you have social security cards and/or green cards?   [place] Care Application Screenin. Do you file taxes, who do you file with (need 1040)?  2. What is your monthly income (need 2 recent paystubs)?   3. Do you have a bank account, need recent statement?      Health Insurance:      Referral/Screening:  FRW Screening      Row Name 23 1310       County Benefits   What is the monthly gross income for the household (wages, social security, workers comp, and pension)? 1270       Insurance:   Was [place]? No       Is this an insurance renewal? Yes       Is this a new insurance application request? No       OTHER   Is this a [place] care application? Yes       Any other information for the FRW? Patient reported his MA stopped, even though he sent all of his paperwork in September. He needs assistance with navigating how to get his insurance re-instated. He accuring bills he cannot afford.

## 2023-12-29 ENCOUNTER — PATIENT OUTREACH (OUTPATIENT)
Dept: CARE COORDINATION | Facility: CLINIC | Age: 63
End: 2023-12-29

## 2023-12-29 NOTE — PROGRESS NOTES
Follow Up Progress Note      Assessment: [facility] RN spoke with patient today to follow up on goals and to discuss any needs or concerns. Patient stated she received the paperwork from [facility] to re-instate his insurance. He was encouraged to complete the paperwork as soon as possible and to get in the mail. Patient stated he has the [employer] application, but has not decided yet if he wants it. Writer offered to walk him through the application if needed. He stated he would let writer know. Patient has an appointment with PCP on 1/2/24 and said he plans to attend this appointment. No other needs or concerns expressed.     Care Gaps:    Health Maintenance Due   Topic Date Due    ZOSTER IMMUNIZATION (1 of 2) Never done    RSV VACCINE (Pregnancy & 60+) (1 - 1-dose 60+ series) Never done    NICOTINE/TOBACCO CESSATION COUNSELING Q 1 YR  04/27/2023    YEARLY PREVENTIVE VISIT  04/27/2023       Scheduled with PCP on 1/2/24      Care Plans  Care Plan: Transportation       Problem: Lack of transportation       Goal: I would like to have [employer] to assist me with my transporation needs.       Start Date: 9/29/2023 Expected End Date: 1/29/2024    Recent Progress: 50%    Priority: High    Note:     Barriers: limited transportation options  Strengths: strong advocate for himself.  Patient expressed understanding of goal: yes  Action steps to achieve this goal:  1. I understand the [facility] RN Jaicnto will mail an application for [employer] to my address.   2. I will complete all the highlighted sections of the application and will bring to Dr. Webb to complete at my next appointment.  3. I understand a representative from the [facility] who manages [employer] will contact me when approved.   4. I will report progress towards this goal at scheduled outreach telephone calls from my Care Coordination team.     Discussed 12/29/23                            Care Plan: General       Problem: HP GENERAL PROBLEM        Goal: I would like to be better about attening my appointments.       Start Date: 9/23/2023 Expected End Date: 9/22/2024    This Visit's Progress: 30% Recent Progress: 20%    Priority: High    Note:     Barriers: difficulty getting to appointments related to limited transportation  Strengths: strong advocate for his wife  Patient expressed understanding of goal: yes  Action steps to achieve this goal:  1. I understand my [facility] team will send me a monthly calendar of appointments.   2. I will place it in a prominent location in my home so I can see it every day.   3. I will  complete [employer] application, so I have better transportation options to get to my appointments.   4. I will report progress towards this goal at scheduled outreach calls from my Care Coordination team.     Discussed 12/29/23                            Care Plan: Financial Wellbeing       Problem: Patient expresses financial resource strain       Goal: I would like to have my insurance back in place.       Start Date: 12/6/2023 Expected End Date: 1/5/2024    This Visit's Progress: 80% Recent Progress: 10%    Priority: High    Note:     Barriers: insurance stopped   Strengths: strong advocate for himself.   Patient expressed understanding of goal: yes  Action steps to achieve this goal:  1. I understand the [facility] RN Jacinto has sent a referral to the Care Coordination Financial Resources Worker for assistance with navigating the renewal of my insurance.   2. I understand the Financial Resources Worker will contact me directly.   3. I will report progress towards this goal at scheduled outreach telephone calls from my Care Coordination team.    Discussed on 12/29/23                              Intervention/Education provided during outreach: Discussed the importance of taking his medications as directed. Encouraged him to attend appointment with PCP on 1/2/24. Encouraged him to contact Care Coordination for any additional needs or concerns.                Plan: CCC RN spoke will continue to monitor, support patient with current goals and will be available to assist as  nursing needs arise.     Care Coordinator will follow up in on month.

## 2024-01-01 NOTE — LETTER
Letter by Rosa Maria Winkler RN at      Author: Rosa Maria Winkler RN Service: -- Author Type: --    Filed:  Encounter Date: 5/12/2020 Status: (Other)         Damien Mcclendon  [address]  [place] [address]      July 28, 2020      Dear Mr. Mcclendon,    We are contacting you because our records show you were due for an INR on 5/5/2020.?   ?  We understand that this is a difficult time. In response to the COVID-19 [facility] has made several changes to help keep you safe. Some examples include limiting in person office visits to essential appointments only, wearing masks, and calling patients the day before appointments to screen for symptoms of illness.?   ?  INR testing is considered essential care during this time. There are potentially serious risks when taking warfarin without careful monitoring, and we want to make sure you stay safe.  ?    Please call the INR clinic at [phone] to discuss your warfarin care, and to set up an appointment. If there has been a change in your medications or provider, please let us know so we can update our records.  ?  Sincerely,  ?  [facility]ticoagulation [facility]          CCHD Screen [07-17]: Initial  Pre-Ductal SpO2(%): 98  Post-Ductal SpO2(%): 100  SpO2 Difference(Pre MINUS Post): -2  Extremities Used: Right Hand, Right Foot  Result: Passed  Follow up: Normal Screen- (No follow-up needed)

## 2024-01-08 ENCOUNTER — DOCUMENTATION ONLY (OUTPATIENT)
Dept: ANTICOAGULATION | Facility: CLINIC | Age: 64
End: 2024-01-08

## 2024-01-08 NOTE — PROGRESS NOTES
ANTICOAGULATION     Damien Mcclendon is overdue for an INR check.     Reminder letter sent      - NO show 11/15/23, 11/24/23, 12/8/23.    Rosa Maria Winkler RN

## 2024-01-15 DIAGNOSIS — E78.2 MIXED HYPERLIPIDEMIA: ICD-10-CM

## 2024-01-16 RX ORDER — ROSUVASTATIN CALCIUM 10 MG/1
10 TABLET, COATED ORAL AT BEDTIME
Qty: 90 TABLET | Refills: 0 | Status: SHIPPED | OUTPATIENT
Start: 2024-01-16

## 2024-01-22 ENCOUNTER — DOCUMENTATION ONLY (OUTPATIENT)
Dept: ANTICOAGULATION | Facility: CLINIC | Age: 64
End: 2024-01-22

## 2024-01-22 NOTE — PROGRESS NOTES
Anticoagulation Clinic Notification    Dr. Webb:    Damien, is past due for an INR. Their last result was 1.3 on 11/8/2023 and was due to come back on 11/15/23.    He was working on getting insurance coverage back in 12/7/24.    He received phone calls and letters over the last several weeks in attempt to arrange follow up labs. Damien Mcclendon will be contacted again today.     Please contact patient directly to discuss compliance with monitoring or schedule visit to review ongoing anticoagulation therapy.    Thank you,     [facility] Anticoagulation Clinic

## 2024-01-22 NOTE — LETTER
[facility] ANTICOAGULATION CLINIC  [address]  [facility] [address]  Phone: [phone]  Fax: [phone]   January 22, 2024        Damien Mcclendon  [address]  [place] [address]            Dear Damien,    You are currently under the care of [facility] for your warfarin (Coumadin , Jantoven ) therapy.  We are contacting you because our records show you were due for an INR on 11/15/23.    Last INR was in 11/8/3 and was low at 1.3.  Please check INR soon.    There are potentially serious risks when taking warfarin without careful monitoring and we want to make sure you are safely managed.  Routine lab monitoring is required for warfarin refills.     Please call [phone] as soon as possible to schedule a lab appointment. If it is difficult for you to get to lab, please call us to discuss options.  If there has been a change in your care or other concerns, please let us know so we can help and/or update our records.         Sincerely,       [facility] Anticoagulation Clinic

## 2024-01-26 ENCOUNTER — PATIENT OUTREACH (OUTPATIENT)
Dept: CARE COORDINATION | Facility: CLINIC | Age: 64
End: 2024-01-26

## 2024-01-26 NOTE — PROGRESS NOTES
Clinic Care Coordination Contact  [facility]/Voicemail    Clinical Data: Care Coordinator Outreach    Outreach Documentation Number of Outreach Attempt   11/22/2023  12:52 PM 1   11/30/2023  10:15 AM 2   11/30/2023  10:16 AM 2   1/26/2024   2:16 PM 1       Left message on patient's voicemail with call back information and requested return call.    Plan: Care Coordinator will try to reach patient again in one week.    David Myhre, RN   CCC RN

## 2024-02-07 ENCOUNTER — PATIENT OUTREACH (OUTPATIENT)
Dept: CARE COORDINATION | Facility: CLINIC | Age: 64
End: 2024-02-07

## 2024-02-07 NOTE — PROGRESS NOTES
"Clinic Care Coordination Contact  Follow Up Progress Note      Assessment: [facility] RN spoke with patient today to follow up goals and to discuss any needs or concerns. Discussed with patient his need for a follow up visit with his PCP and lab draws. Patient stated he is still trying to get his insurance paperwork in. He stated she would look around his house for the paperwork and will sign and return to the [facility]. Patient stated he was taking some of his medications. Again encouraged patient to find his paperwork and send to the [facility] to get his insurance back in place. Patient appeared perturbed with writer and stated, \"I am well aware of what I have to do\".     Care Gaps:    Health Maintenance Due   Topic Date Due    ZOSTER IMMUNIZATION (1 of 2) Never done    RSV VACCINE (Pregnancy & 60+) (1 - 1-dose 60+ series) Never done    NICOTINE/TOBACCO CESSATION COUNSELING Q 1 YR  04/27/2023    YEARLY PREVENTIVE VISIT  04/27/2023    PHQ-2 (once per calendar year)  01/01/2024       Patient accepted scheduling phone number for PCP  to schedule independently     Care Plans  Care Plan: Transportation       Problem: Lack of transportation       Goal: I would like to have [employer] to assist me with my transporation needs.       Start Date: 9/29/2023 Expected End Date: 1/29/2024    Recent Progress: 50%    Priority: High    Note:     Barriers: limited transportation options  Strengths: strong advocate for himself.  Patient expressed understanding of goal: yes  Action steps to achieve this goal:  1. I understand the [facility] RN Jacinto will mail an application for [employer] to my address.   2. I will complete all the highlighted sections of the application and will bring to Dr. Webb to complete at my next appointment.  3. I understand a representative from the [facility] who manages [employer] will contact me when approved.   4. I will report progress towards this goal at scheduled outreach telephone calls from my " Care Coordination team.     Discussed 2/7/24                            Care Plan: General       Problem: HP GENERAL PROBLEM       Goal: I would like to be better about attening my appointments.       Start Date: 9/23/2023 Expected End Date: 9/22/2024    Recent Progress: 30%    Priority: High    Note:     Barriers: difficulty getting to appointments related to limited transportation  Strengths: strong advocate for his wife  Patient expressed understanding of goal: yes  Action steps to achieve this goal:  1. I understand my CCC team will send me a monthly calendar of appointments.   2. I will place it in a prominent location in my home so I can see it every day.   3. I will  complete [employer] application, so I have better transportation options to get to my appointments.   4. I will report progress towards this goal at scheduled outreach calls from my Care Coordination team.     Discussed 2/7/24                            Care Plan: Financial Wellbeing       Problem: Patient expresses financial resource strain       Goal: I would like to have my insurance back in place.       Start Date: 12/6/2023 Expected End Date: 3/6/2024    Recent Progress: 80%    Priority: High    Note:     Barriers: insurance stopped   Strengths: strong advocate for himself.   Patient expressed understanding of goal: yes  Action steps to achieve this goal:  1. I understand the [facility] RN Jacinto has sent a referral to the Care Coordination Financial Resources Worker for assistance with navigating the renewal of my insurance.   2. I understand the Financial Resources Worker will contact me directly.   3. I will report progress towards this goal at scheduled outreach telephone calls from my Care Coordination team.    Discussed on 2/7/24                              Intervention/Education provided during outreach: Encouraged patient to complete paperwork to re-instated his insurance. Encouraged him to contact Care Coordination for any additional  needs or concerns.      Outreach Frequency: monthly, more frequently as needed      Plan: CCC RN will continue to monitor, support patient with current goal and will be available to assist as nursing needs arise.     Care Coordinator will follow up in two weeks.

## 2024-02-12 ENCOUNTER — DOCUMENTATION ONLY (OUTPATIENT)
Dept: ANTICOAGULATION | Facility: CLINIC | Age: 64
End: 2024-02-12

## 2024-02-12 DIAGNOSIS — Z86.718 HISTORY OF DVT (DEEP VEIN THROMBOSIS): Primary | ICD-10-CM

## 2024-02-12 DIAGNOSIS — Z79.01 LONG TERM (CURRENT) USE OF ANTICOAGULANTS: ICD-10-CM

## 2024-02-12 DIAGNOSIS — I73.9 PVD (PERIPHERAL VASCULAR DISEASE) (H): ICD-10-CM

## 2024-02-12 DIAGNOSIS — I48.19 PERSISTENT ATRIAL FIBRILLATION (H): ICD-10-CM

## 2024-02-12 NOTE — PROGRESS NOTES
ANTICOAGULATION CLINIC REFERRAL RENEWAL REQUEST       An annual renewal order is required for all patients referred to [facility] Anticoagulation Clinic.?  Please review and sign the pended referral order for Damien Mcclendon.       ANTICOAGULATION SUMMARY      Warfarin indication(s)   - Hx of PE and DVT.  - Atrial Fibrillation, persistent / permanent noted in 6/29/2005.  - Peripheral Vascular Disease     Mechanical heart valve present?  NO       Current goal range   INR: 2.0-3.0     Goal appropriate for indication? Goal INR 2-3, standard for indication(s) above     Time in Therapeutic Range (TTR)  (Goal > 60%) 80.5 %       Office visit with referring provider's group within last year Yes on 10/23/2023.       Rosa Maria Winkler RN  [facility] Anticoagulation Clinic

## 2024-02-20 ENCOUNTER — TELEPHONE (OUTPATIENT)
Dept: ANTICOAGULATION | Facility: CLINIC | Age: 64
End: 2024-02-20

## 2024-02-20 NOTE — TELEPHONE ENCOUNTER
ANTICOAGULATION MANAGEMENT PROGRAM    Dr. Webb,     Our records indicate that Damien Mcclendon remains past due to check an INR. Damien Mcclendon was contacted multiple times over at least the last 8 weeks to attempt to arrange a follow up appointment.    Damien Mcclendon last had an an INR checked on 11/8/2023 and was due for follow up on 11/15/2023.     Called patient,Left message for patient to call and schedule lab appointment as soon as possible. If returning call, please schedule.    - most important of all, is to call and schedule INR to know his INR status.   - if not able to locate his papers to be signed, advised to call the [facility] and send a new one, to get insurance reinstated.   - On 2/7/24 Clinic Care Coordination (David Myhre, HOSSEIN) assissted patient and to send paper work to the [facility] to get his insurance back.     At this time Damien Mcclendon will be moved to noncompliant status within the program until their referral expires on 2/12/2025. While in noncompliant status the patient would continue to be contacted every 6 weeks by the anticoagulation program to attempt to schedule patient. You will be notified of each contact attempt to make you aware of patient's ongoing noncompliance.        Thank you,     [facility] Anticoagulation Management Program

## 2024-02-29 NOTE — PROGRESS NOTES
DISCHARGE  Reason for Discharge: Patient has failed to schedule further appointments.    Equipment Issued: none    Discharge Plan: Patient to continue home program.    Referring Provider:  Ronald Webb     10/23/23 0500   Appointment Info   Signing clinician's name / credentials Laurita Johnson, PT, DPT   Total/Authorized Visits 12   Visits Used 1   Medical Diagnosis Z91.81 (ICD-10-CM) - At high risk for falls   PT Tx Diagnosis Balance impairment, generalized weakness   Precautions/Limitations Falls   Progress Note/Certification   Start of Care Date 10/23/23   Onset of illness/injury or Date of Surgery 09/12/23   Therapy Frequency 23   Predicted Duration 12 weeks   GOALS   PT Goals 2;3;4;5   PT Goal 1   Goal Identifier HEP   Goal Description Patient will be independent in self-management of condition and HEP to reach functional goals.   Target Date 01/15/24   PT Goal 2   Goal Identifier FGA   Goal Description Pt will demonstrate a 4 point improvement on the FGA to demonstrate minimum clinically significant difference in score to demonstrate improved safety with functional mobility and decrease risk of falls.   Rationale to maximize safety and independence with performance of ADLs and functional tasks;to maximize safety and independence with self cares   Target Date 01/15/24   PT Goal 3   Goal Identifier 6 MWT   Goal Description Pt will increase distance ambulated on 6MWT with LRAD by 120 feet to demonstrate improved endurance with ambulation.   Rationale to maximize safety and independence with performance of ADLs and functional tasks;to maximize safety and independence with self cares   Target Date 01/15/24   PT Goal 4   Goal Identifier 5 x STS   Goal Description Pt will be able to perform 5xSTS in 12 seconds or less to demonstrate appropriate LE strength for community dwelling adults.   Target Date 01/15/24   PT Goal 5   Goal Identifier Walking   Goal Description Pt will be able to walk down to the corner with LRAD  and back without any stumbles and self report of decreased steadiness   Target Date 01/15/24   Subjective Report   Subjective Report Pt presents today reporting noting more unsteadiness and difficulty with walking. Wants to improve strength and walk to corner and back. Additionally has some low back pain. Had one fall from cat, otherwise no falls more recent.   Objective Measures   Objective Measures Objective Measure 1;Objective Measure 2;Objective Measure 3   Objective Measure 1   Objective Measure TUG   Details (eval) 14 sec   Objective Measure 2   Objective Measure 5 x STS   Details (eval) 14.8 seconds with BUE on handrails   Objective Measure 3   Objective Measure FGA   Details (eval) 17/30   Treatment Interventions (PT)   Interventions Therapeutic Procedure/Exercise   Therapeutic Procedure/Exercise   Therapeutic Procedures: strength, endurance, ROM, flexibillity minutes (90785) 25   Ther Proc 1 - Details Educated pt on benefit of regular strengtening. Benefit of hip strengthening for low back pain and improved balance. Instructed in HEP - bridge, clamshell x 10 B. romberg and sharpened romberg 30'' each condition. Romberg with head turns x 30''. Standing with eyes closed 30''. Added HEP to homescreen.   Skilled Intervention Initiated HEP, Patient education, Verbal and tactile cues utilized to facilitate correct exercise technique   Patient Response/Progress Tolerated well, verbalizes understanding   Eval/Assessments   PT Eval, Low Complexity Minutes (85428) 20   Education   Learner/Method Patient   Education Comments Verbalizes understanding   Plan   Home program PTRx (phone) - bridge, clamshell, romberg and sharpend romberg   Plan for next session Review HEP - progress proximal hip strength. Balance challenges. 6 MWT   Comments   Comments Pt presents with balance and gait unsteadiness that had progressed over the years. Reports feeling generally more unsteady and this impairing confidence with walking. Upon  eval pt is demonstrating high risk for falls and BLE weakness, would benefit from skilled PT to decrease falls risk and improve function.   Total Session Time   Timed Code Treatment Minutes 25   Total Treatment Time (sum of timed and untimed services) 45     TAMRA HALL, PT

## 2024-03-05 ENCOUNTER — PATIENT OUTREACH (OUTPATIENT)
Dept: CARE COORDINATION | Facility: CLINIC | Age: 64
End: 2024-03-05

## 2024-03-05 NOTE — LETTER
[facility]  Patient Centered Plan of Care  About Me:        Patient Name:  Damien Quezada,     I hope you are well! Here is a copy of your Care Plan with the goals we are supporting you with at this time. Please feel free to contact me if I can be of help.     Thanks,     Dave Myhre, RN C  [facility] RN  [phone]    YOB: 1960  Age:         63 year old   [facility] MRN:    [id number] Telephone Information:  Home Phone [phone]   Mobile [phone]       Address:  [address]  [place] [address] Email address:  No e-mail address on record      Emergency Contact(s)    Name Relationship Lgl Grd Work Phone Home Phone Mobile Phone   CHIRAG MORRIS Spouse   [phone]           Primary language:  English     needed? No   [place] [employer]:  [phone] op. 1  Other communication barriers:None    Preferred Method of Communication:     Current living arrangement: I live in a private home with spouse    Mobility Status/ Medical Equipment: Independent w/Device        Health Maintenance  Health Maintenance Reviewed: Due/Overdue   Health Maintenance Due   Topic Date Due    ZOSTER IMMUNIZATION (1 of 2) Never done    RSV VACCINE (Pregnancy & 60+) (1 - 1-dose 60+ series) Never done    NICOTINE/TOBACCO CESSATION COUNSELING Q 1 YR  04/27/2023    YEARLY PREVENTIVE VISIT  04/27/2023    PHQ-2 (once per calendar year)  01/01/2024    A1C  03/12/2024          My Access Plan  Medical Emergency 911   Primary Clinic Line [facility] - [phone]   24 Hour Appointment Line [phone] or  [phone] (toll-free)   24 Hour Nurse Line [phone] (toll-free)   Preferred Urgent Care [facility], [phone]     [facility]  [phone]     Preferred Pharmacy [id number] - [place], [place] - [address]     Behavioral Health Crisis Line The  National Suicide Prevention Lifeline at 1-453.700.6715 or Text/Call 988           My Care Team Members  Patient Care Team         Relationship Specialty Notifications Start End    Ronald Webb MD PCP - General   4/4/08     Phone: [phone] Fax: [phone] [address] [facility] [address]    Ronald Webb MD Assigned PCP   6/16/21     Phone: [phone] Fax: [phone] [address] [facility] [address]    Anisa Nunes, W Community Health Worker Primary Care - CC Admissions 9/12/23     Gildardo Cho MD MD Cardiovascular Disease  9/12/23     Phone: [phone] Fax: [phone] [address] [facility] [address] [facility] [address]    Gildardo Cho MD Assigned Heart and Vascular Provider   11/11/23     Phone: [phone] Fax: [phone] [address] [facility] [address] [facility] [address]                My Care Plans  Self Management and Treatment Plan    Care Plan  Care Plan: Transportation       Problem: Lack of transportation       Goal: I would like to have [employer] to assist me with my transporation needs.       Start Date: 9/29/2023 Expected End Date: 1/29/2024    Recent Progress: 50%    Priority: High    Note:     Barriers: limited transportation options  Strengths: strong advocate for himself.  Patient expressed understanding of goal: yes  Action steps to achieve this goal:  1. I understand the [facility] RN Jacinto will mail an application for [employer] to my address.   2. I will complete all the highlighted sections of the application and will bring to Dr. Webb to complete at my next appointment.  3. I understand a representative from the [facility] who manages [employer] will contact me when approved.   4. I will report progress towards this goal at scheduled outreach telephone calls from my Care Coordination team.     Discussed 2/7/24                            Care Plan: General       Problem: HP GENERAL PROBLEM       Goal: I would like to  be better about attening my appointments.       Start Date: 9/23/2023 Expected End Date: 9/22/2024    Recent Progress: 30%    Priority: High    Note:     Barriers: difficulty getting to appointments related to limited transportation  Strengths: strong advocate for his wife  Patient expressed understanding of goal: yes  Action steps to achieve this goal:  1. I understand my [facility] team will send me a monthly calendar of appointments.   2. I will place it in a prominent location in my home so I can see it every day.   3. I will  complete [employer] application, so I have better transportation options to get to my appointments.   4. I will report progress towards this goal at scheduled outreach calls from my Care Coordination team.     Discussed 2/7/24                            Care Plan: Financial Wellbeing       Problem: Patient expresses financial resource strain       Goal: I would like to have my insurance back in place.       Start Date: 12/6/2023 Expected End Date: 3/6/2024    Recent Progress: 80%    Priority: High    Note:     Barriers: insurance stopped   Strengths: strong advocate for himself.   Patient expressed understanding of goal: yes  Action steps to achieve this goal:  1. I understand the [facility] RN Jacinto has sent a referral to the Care Coordination Financial Resources Worker for assistance with navigating the renewal of my insurance.   2. I understand the Financial Resources Worker will contact me directly.   3. I will report progress towards this goal at scheduled outreach telephone calls from my Care Coordination team.    Discussed on 2/7/24                              Action Plans on File:                       Advance Care Plans/Directives:   Advanced Care Plan/Directives on file:   No    Discussed with patient/caregiver(s): No data recorded           My Medical and Care Information  Problem List   Patient Active Problem List   Diagnosis    Tobacco abuse    Mixed hyperlipidemia    Chronic Cutaneous  Ulcer Venous Stasis    Atrial Fibrillation    Male Erectile Disorder    Memory Lapses Or Loss    Abnormal Weight Loss    History of DVT (deep vein thrombosis)    Macrocytosis    Prediabetes    Essential hypertension, benign    Thrombocytopenia (H24)    Long term (current) use of anticoagulants    Persistent atrial fibrillation (H)    Generalized muscle weakness    Impairment of balance    PVD (peripheral vascular disease) (H24)      Current Medications and Allergies:  See printed Medication Report.    Care Coordination Start Date: 9/12/2023   Frequency of Care Coordination: monthly, more frequently as needed     Form Last Updated: 03/05/2024

## 2024-04-02 ENCOUNTER — TELEPHONE (OUTPATIENT)
Dept: ANTICOAGULATION | Facility: CLINIC | Age: 64
End: 2024-04-02

## 2024-04-02 ENCOUNTER — DOCUMENTATION ONLY (OUTPATIENT)
Dept: ANTICOAGULATION | Facility: CLINIC | Age: 64
End: 2024-04-02

## 2024-04-02 NOTE — TELEPHONE ENCOUNTER
ANTICOAGULATION MANAGEMENT PROGRAM    Dr Webb,     Our records indicate that Damien Mcclendon remains past due to check an INR. Damien Mcclendon was contacted multiple times over at least the last 8 weeks to attempt to arrange a follow up appointment.    Damien Mcclendon last had an an INR checked on 11/8/23 and was due for follow up on 11/15/23     Called patient,Left message for patient to call and schedule lab appointment as soon as possible. If returning call, please schedule.      At this time Damien Mcclendon will be moved to noncompliant status within the program until their referral expires on 2/12/24. While in noncompliant status the patient would continue to be contacted every 6 weeks by the anticoagulation program to attempt to schedule patient. You will be notified of each contact attempt to make you aware of patient's ongoing noncompliance.      At this time I will remove him from [facility]. We will start a new referral if he resumes warfarin/schedules inr.    Thank you,     [facility] Anticoagulation Management Program

## 2024-04-02 NOTE — PROGRESS NOTES
ANTICOAGULATION  MANAGEMENT    Damien Mcclendon is being discharged from the [facility] Anticoagulation Management Program ([facility]).    Reason for discharge: non-compliance with Anticoagulation Management Program despite outreach.  Last  an INR was done on 11/3/23. Letter mailed to patient advising them to schedule visit with referring provider.       Anticoagulation episode resolved    If patient needs warfarin management in the future, please send a new referral    Mal Castaneda RN

## 2024-04-03 ENCOUNTER — PATIENT OUTREACH (OUTPATIENT)
Dept: CARE COORDINATION | Facility: CLINIC | Age: 64
End: 2024-04-03

## 2024-04-03 NOTE — Clinical Note
Spoke with Damien today. Please seen note. I let anticoagulation also know I made contact with him. He is schedule for INR on Monday.

## 2024-04-03 NOTE — PROGRESS NOTES
"Follow Up Progress Note      Assessment: [facility] RN spoke with patient today to follow up on goals and to discuss any needs or concerns. Patient reported overall he was \"feeling pretty good\". Discussed missed calls from Anticoagulation Clinic. He agreed to assistance with INR lab draw on 4/8/24. Patient not willing to schedule with PCP as he is currently without insurance. Patient had MA in the past, but said [facility] said he did not complete his renewal application correctly. Patient said is not sure what more the [facility] needs. He agreed to have writer assist him with contacting the [facility]. He will provide writer with the name of his Financial Worker. Writer provided patient with his direct phone number.     Care Gaps:    Health Maintenance Due   Topic Date Due    ZOSTER IMMUNIZATION (1 of 2) Never done    RSV VACCINE (Pregnancy & 60+) (1 - 1-dose 60+ series) Never done    NICOTINE/TOBACCO CESSATION COUNSELING Q 1 YR  04/27/2023    YEARLY PREVENTIVE VISIT  04/27/2023    PHQ-2 (once per calendar year)  01/01/2024    A1C  03/12/2024       Patient not willing to schedule with his PCP as he currently has not insurance.     Care Plans  Care Plan: Transportation       Problem: Lack of transportation       Goal: I would like to have [employer] to assist me with my transporation needs.       Start Date: 9/29/2023 Expected End Date: 1/29/2024    Recent Progress: 50%    Priority: High    Note:     Barriers: limited transportation options  Strengths: strong advocate for himself.  Patient expressed understanding of goal: yes  Action steps to achieve this goal:  1. I understand the [facility] RN Jacinto will mail an application for [employer] to my address.   2. I will complete all the highlighted sections of the application and will bring to Dr. Webb to complete at my next appointment.  3. I understand a representative from the [facility] who manages [employer] will contact me when approved.   4. I will report " progress towards this goal at scheduled outreach telephone calls from my Care Coordination team.     Discussed 4/3/24                            Care Plan: General       Problem: HP GENERAL PROBLEM       Goal: I would like to be better about attening my appointments.       Start Date: 9/23/2023 Expected End Date: 9/22/2024    Recent Progress: 30%    Priority: High    Note:     Barriers: difficulty getting to appointments related to limited transportation  Strengths: strong advocate for his wife  Patient expressed understanding of goal: yes  Action steps to achieve this goal:  1. I understand my [facility] team will send me a monthly calendar of appointments.   2. I will place it in a prominent location in my home so I can see it every day.   3. I will  complete [employer] application, so I have better transportation options to get to my appointments.   4. I will report progress towards this goal at scheduled outreach calls from my Care Coordination team.   4/3/24                            Care Plan: Financial Wellbeing       Problem: Patient expresses financial resource strain       Goal: I would like to have my insurance back in place.       Start Date: 12/6/2023 Expected End Date: 3/6/2024    Recent Progress: 80%    Priority: High    Note:     Barriers: insurance stopped   Strengths: strong advocate for himself.   Patient expressed understanding of goal: yes  Action steps to achieve this goal:  1. I understand the [facility] RN Jacinto has sent a referral to the Care Coordination Financial Resources Worker for assistance with navigating the renewal of my insurance.   2. I understand the Financial Resources Worker will contact me directly.   3. I will report progress towards this goal at scheduled outreach telephone calls from my Care Coordination team.    Discussed on 4/3/24                              Intervention/Education provided during outreach: Encouraged him to contact writer with his Financial Worker name and phone  number to assist with getting his insurance in back in place.      Outreach Frequency: monthly, more frequently as needed      Plan: CCC RN will continue to monitor, support patient with current goal and will be available to assist as nursing needs arise.     Care Coordinator will follow up in one week.

## 2024-04-03 NOTE — PROGRESS NOTES
Clinic Care Coordination Contact  [facility]/Voicemail    Clinical Data: Care Coordinator Outreach    Outreach Documentation Number of Outreach Attempt   4/3/2024   3:02 PM 1       Left message on patient's voicemail with call back information and requested return call.    Plan Care Coordinator will try to reach patient again in 1 month.    David Myhre, RN   CCC RN

## 2024-04-03 NOTE — LETTER
[facility]  Patient Centered Plan of Care  About Me:        Patient Name:  Damien Quezada,     It was nice to talk with you today! Here is a copy of your Care Plan with the goals we are supporting you with at this time. Please let me know if I can help in any other way.     Thanks,     Dave Myhre, RN   CCC RN  [phone]    YOB: 1960  Age:         63 year old   En[employer] MRN:    [id number] Telephone Information:  Home Phone [phone]   Mobile [phone]       Address:  [address]  [place] [address] Email address:  No e-mail address on record      Emergency Contact(s)    Name Relationship Lgl Grd Work Phone Home Phone Mobile Phone   1. CHIRAG LOVELACE Spouse   [phone]           Primary language:  English     needed? No   [place] [employer]:  [phone] op. 1  Other communication barriers:None    Preferred Method of Communication:     Current living arrangement: I live in a private home with spouse    Mobility Status/ Medical Equipment: Independent w/Device        Health Maintenance  Health Maintenance Reviewed: Due/Overdue   Health Maintenance Due   Topic Date Due    ZOSTER IMMUNIZATION (1 of 2) Never done    RSV VACCINE (Pregnancy & 60+) (1 - 1-dose 60+ series) Never done    NICOTINE/TOBACCO CESSATION COUNSELING Q 1 YR  04/27/2023    YEARLY PREVENTIVE VISIT  04/27/2023    PHQ-2 (once per calendar year)  01/01/2024    A1C  03/12/2024          My Access Plan  Medical Emergency 911   Primary Clinic Line [facility] - [phone]   24 Hour Appointment Line [phone] or  [phone]) (toll-free)   24 Hour Nurse Line [phone] (toll-free)   Preferred Urgent Care [facility], [phone]     Preferred Hospital [facility]  [phone]     Preferred Pharmacy [id number] - [place], [place] - [address] [place] [address]     Behavioral Health Crisis  Line The National Suicide Prevention Lifeline at 1-703.147.7164 or Text/Call 988           My Care Team Members  Patient Care Team         Relationship Specialty Notifications Start End    Ronald Webb MD PCP - General   4/4/08     Phone: [phone] Fax: [phone] [address] [facility] [address]    Ronald Webb MD Assigned PCP   6/16/21     Phone: [phone] Fax: [phone] [address] [facility] [address]    Anisa Nunes, W Community Health Worker Primary Care - CC Admissions 9/12/23     Gildardo Cho MD MD Cardiovascular Disease  9/12/23     Phone: [phone] Fax: [phone] [address] [facility] [address] [facility] [address]    Myhre, David J, RN Lead Care Coordinator Primary Care - CC Admissions 9/22/23     Phone: [phone]         Gildardo Cho MD Assigned Heart and Vascular Provider   11/11/23     Phone: [phone] Fax: [phone]         [address] [facility] [address] [facility] [address]                My Care Plans  Self Management and Treatment Plan    Care Plan  Care Plan: Transportation       Problem: Lack of transportation       Goal: I would like to have [employer] to assist me with my transporation needs.       Start Date: 9/29/2023 Expected End Date: 1/29/2024    Recent Progress: 50%    Priority: High    Note:     Barriers: limited transportation options  Strengths: strong advocate for himself.  Patient expressed understanding of goal: yes  Action steps to achieve this goal:  1. I understand the [facility] RN Jacinto will mail an application for [employer] to my address.   2. I will complete all the highlighted sections of the application and will bring to Dr. Webb to complete at my next appointment.  3. I understand a representative from the [facility] who manages [employer] will contact me when approved.   4. I will report progress towards this goal at scheduled outreach telephone calls from my Care Coordination team.      Discussed 4/3/24                            Care Plan: General       Problem: HP GENERAL PROBLEM       Goal: I would like to be better about attening my appointments.       Start Date: 9/23/2023 Expected End Date: 9/22/2024    Recent Progress: 30%    Priority: High    Note:     Barriers: difficulty getting to appointments related to limited transportation  Strengths: strong advocate for his wife  Patient expressed understanding of goal: yes  Action steps to achieve this goal:  1. I understand my [facility] team will send me a monthly calendar of appointments.   2. I will place it in a prominent location in my home so I can see it every day.   3. I will  complete [employer] application, so I have better transportation options to get to my appointments.   4. I will report progress towards this goal at scheduled outreach calls from my Care Coordination team.   4/3/24                            Care Plan: Financial Wellbeing       Problem: Patient expresses financial resource strain       Goal: I would like to have my insurance back in place.       Start Date: 12/6/2023 Expected End Date: 3/6/2024    Recent Progress: 80%    Priority: High    Note:     Barriers: insurance stopped   Strengths: strong advocate for himself.   Patient expressed understanding of goal: yes  Action steps to achieve this goal:  1. I understand the [facility] RN Jacinto has sent a referral to the Care Coordination Financial Resources Worker for assistance with navigating the renewal of my insurance.   2. I understand the Financial Resources Worker will contact me directly.   3. I will report progress towards this goal at scheduled outreach telephone calls from my Care Coordination team.    Discussed on 4/3/24                              Action Plans on File:                       Advance Care Plans/Directives:   Advanced Care Plan/Directives on file:   No    Discussed with patient/caregiver(s): No data recorded           My Medical and Care  Information  Problem List   Patient Active Problem List   Diagnosis    Tobacco abuse    Mixed hyperlipidemia    Chronic Cutaneous Ulcer Venous Stasis    Atrial Fibrillation    Male Erectile Disorder    Memory Lapses Or Loss    Abnormal Weight Loss    History of DVT (deep vein thrombosis)    Macrocytosis    Prediabetes    Essential hypertension, benign    Thrombocytopenia (H24)    Long term (current) use of anticoagulants    Persistent atrial fibrillation (H)    Generalized muscle weakness    Impairment of balance    PVD (peripheral vascular disease) (H24)      Current Medications and Allergies:  See printed Medication Report.    Care Coordination Start Date: 9/12/2023   Frequency of Care Coordination: monthly, more frequently as needed     Form Last Updated: 04/03/2024

## 2024-04-09 ENCOUNTER — DOCUMENTATION ONLY (OUTPATIENT)
Dept: ANTICOAGULATION | Facility: CLINIC | Age: 64
End: 2024-04-09

## 2024-04-15 DIAGNOSIS — I48.19 PERSISTENT ATRIAL FIBRILLATION (H): ICD-10-CM

## 2024-04-16 RX ORDER — METOPROLOL SUCCINATE 200 MG/1
200 TABLET, EXTENDED RELEASE ORAL DAILY
Qty: 90 TABLET | Refills: 0 | Status: SHIPPED | OUTPATIENT
Start: 2024-04-16 | End: 2024-08-23

## 2024-05-29 ENCOUNTER — PATIENT OUTREACH (OUTPATIENT)
Dept: CARE COORDINATION | Facility: CLINIC | Age: 64
End: 2024-05-29

## 2024-05-29 NOTE — PROGRESS NOTES
Clinic Care Coordination Contact  [facility]/Voicemail    Clinical Data: Care Coordinator Outreach    Outreach Documentation Number of Outreach Attempt   5/29/2024   3:48 PM 1       Unable to leave a voicemail as patient's voicemail box is full.     Plan: Care Coordinator will try to reach patient again in one week.    David Myhre, RN   CCC RN

## 2024-05-30 DIAGNOSIS — Z86.718 PERSONAL HISTORY OF VENOUS THROMBOSIS AND EMBOLISM: ICD-10-CM

## 2024-05-30 DIAGNOSIS — I48.91 ATRIAL FIBRILLATION (H): ICD-10-CM

## 2024-05-30 DIAGNOSIS — Z79.01 LONG TERM CURRENT USE OF ANTICOAGULANT THERAPY: ICD-10-CM

## 2024-05-30 DIAGNOSIS — Z86.718 HISTORY OF DVT (DEEP VEIN THROMBOSIS): ICD-10-CM

## 2024-06-03 ENCOUNTER — TELEPHONE (OUTPATIENT)
Dept: INTERNAL MEDICINE | Facility: CLINIC | Age: 64
End: 2024-06-03

## 2024-06-03 NOTE — TELEPHONE ENCOUNTER
"----- Message from Ronald Webb MD sent at 6/3/2024 12:19 PM CDT -----  Patient made an appointment for a \"possible blood clot\".  He did not show up to this appointment.  Can you please find out what is going on with him.  This really should have been triaged.  I have not seen him in a couple years.  I believe he has been without insurance and he has been discharged from our anticoagulation clinic.  I was not sure if he was seeing someone else.  "

## 2024-06-03 NOTE — TELEPHONE ENCOUNTER
"Patient states he \"does not remember making any call for an appointment\".  Patient denies any symptoms of a blood clot or other health issues.  Patient stated he would contact clinic if he needs anything and abruptly ended call.    "

## 2024-06-04 RX ORDER — WARFARIN SODIUM 5 MG/1
TABLET ORAL
Qty: 90 TABLET | Refills: 0 | Status: SHIPPED | OUTPATIENT
Start: 2024-06-04

## 2024-06-04 NOTE — TELEPHONE ENCOUNTER
Please contact patient and let him know that it is imperative he make an appointment to see me.  Alternatively I am recommending that he find a new provider.  If he is concerned about monetary issues, I am sure that [facility] can work with him on that.

## 2024-06-19 ENCOUNTER — PATIENT OUTREACH (OUTPATIENT)
Dept: CARE COORDINATION | Facility: CLINIC | Age: 64
End: 2024-06-19

## 2024-06-19 NOTE — PROGRESS NOTES
Clinic Care Coordination Contact  Follow Up Progress Note      Assessment: [facility] RN spoke with patient to discuss goals and any needs or concerns. Patient stated he has not made any progress towards getting his MA back in place. He agreed to come to the Clinic on 6/27/24 at 1 pm for assistance with reapplying on-line. Patient said he dose not have a commuter at home. Writer will additionally assist him with completing [employer] application related to his transportation needs.     Care Gaps:    Health Maintenance Due   Topic Date Due    ZOSTER IMMUNIZATION (1 of 2) Never done    RSV VACCINE (Pregnancy & 60+) (1 - 1-dose 60+ series) Never done    NICOTINE/TOBACCO CESSATION COUNSELING Q 1 YR  04/27/2023    YEARLY PREVENTIVE VISIT  04/27/2023    PHQ-2 (once per calendar year)  01/01/2024    A1C  03/12/2024       Patient accepted scheduling phone number for PCP  to schedule independently     Care Plans  Care Plan: Transportation       Problem: Lack of transportation       Goal: I would like to have [employer] to assist me with my transporation needs.       Start Date: 9/29/2023 Expected End Date: 1/29/2024    Recent Progress: 50%    Priority: High    Note:     Barriers: limited transportation options  Strengths: strong advocate for himself.  Patient expressed understanding of goal: yes  Action steps to achieve this goal:  1. I understand the [facility] RN Jacinto will mail an application for [employer] to my address.   2. I will complete all the highlighted sections of the application and will bring to Dr. Webb to complete at my next appointment.  3. I understand a representative from the [facility] who manages [employer] will contact me when approved.   4. I will report progress towards this goal at scheduled outreach telephone calls from my Care Coordination team.     Discussed 6/19/24                            Care Plan: General       Problem: HP GENERAL PROBLEM       Goal: I would like to be better  about attening my appointments.       Start Date: 6/19/2024 Expected End Date: 9/22/2024    Recent Progress: 30%    Priority: High    Note:     Barriers: difficulty getting to appointments related to limited transportation  Strengths: strong advocate for his wife  Patient expressed understanding of goal: yes  Action steps to achieve this goal:  1. I understand my CCC team will send me a monthly calendar of appointments.   2. I will place it in a prominent location in my home so I can see it every day.   3. I will  complete [employer] application, so I have better transportation options to get to my appointments.   4. I will report progress towards this goal at scheduled outreach calls from my Care Coordination team.   6/19/24                            Care Plan: Financial Wellbeing       Problem: Patient expresses financial resource strain       Goal: I would like to have my insurance back in place.       Start Date: 12/6/2023 Expected End Date: 3/6/2024    Recent Progress: 80%    Priority: High    Note:     Barriers: insurance stopped   Strengths: strong advocate for himself.   Patient expressed understanding of goal: yes  Action steps to achieve this goal:  1. I understand the CCC RN Jacinto has sent a referral to the Care Coordination Financial Resources Worker for assistance with navigating the renewal of my insurance.   2. I understand the Financial Resources Worker will contact me directly.   3. I will report progress towards this goal at scheduled outreach telephone calls from my Care Coordination team.    Discussed on 4/3/24                              Intervention/Education provided during outreach: Encouraged him to attend appointment with CCC RN on 6/27/24          Plan: CCC RN will continue to monitor, support patient with current goal and will be available to assist as nursing needs arise.     Care Coordinator will follow up on 6/27/24

## 2024-07-10 ENCOUNTER — ALLIED HEALTH/NURSE VISIT (OUTPATIENT)
Dept: NURSING | Facility: CLINIC | Age: 64
End: 2024-07-10

## 2024-07-10 DIAGNOSIS — Z71.89 COUNSELING AND COORDINATION OF CARE: Primary | ICD-10-CM

## 2024-07-10 PROCEDURE — 99207 PR NO CHARGE NURSE ONLY: CPT

## 2024-07-19 NOTE — PROGRESS NOTES
Follow Up Progress Note      Assessment: [facility] RN met with patient today to complete application for [employer] application. Patient has not had insurance for one year. He stated he lost his MA because he said did not complete his application correctly according to [facility]. Patient completed application today, and faxed to [facility]. Patient denied any other needs or concern at this time.     Care Gaps:    Health Maintenance Due   Topic Date Due    ZOSTER IMMUNIZATION (1 of 2) Never done    RSV VACCINE (Pregnancy & 60+) (1 - 1-dose 60+ series) Never done    NICOTINE/TOBACCO CESSATION COUNSELING Q 1 YR  04/27/2023    YEARLY PREVENTIVE VISIT  04/27/2023    PHQ-2 (once per calendar year)  01/01/2024    A1C  03/12/2024       Patient accepted scheduling phone number for PCP  to schedule independently     Care Plans  Care Plan: Transportation       Problem: Lack of transportation       Long-Range Goal: I would like to have [employer] to assist me with my transporation needs.       Start Date: 9/29/2023 Expected End Date: 1/29/2024    Recent Progress: 50%    Priority: High    Note:     Barriers: limited transportation options  Strengths: strong advocate for himself.  Patient expressed understanding of goal: yes  Action steps to achieve this goal:  1. I understand the [facility] RN Jacinto will mail an application for [employer] to my address.   2. I will complete all the highlighted sections of the application and will bring to Dr. Webb to complete at my next appointment.  3. I understand a representative from the [facility] who manages [employer] will contact me when approved.   4. I will report progress towards this goal at scheduled outreach telephone calls from my Care Coordination team.     Discussed 7/10/24                            Care Plan: General       Problem: HP GENERAL PROBLEM       Goal: I would like to be better about attening my appointments.       Start Date: 6/19/2024 Expected End  Date: 9/22/2024    Recent Progress: 30%    Priority: High    Note:     Barriers: difficulty getting to appointments related to limited transportation  Strengths: strong advocate for his wife  Patient expressed understanding of goal: yes  Action steps to achieve this goal:  1. I understand my CCC team will send me a monthly calendar of appointments.   2. I will place it in a prominent location in my home so I can see it every day.   3. I will  complete [employer] application, so I have better transportation options to get to my appointments.   4. I will report progress towards this goal at scheduled outreach calls from my Care Coordination team.   Discussed on 7/10/24                            Care Plan: Financial Wellbeing       Problem: Patient expresses financial resource strain       Goal: I would like to have my insurance back in place.       Start Date: 12/6/2023 Expected End Date: 3/6/2024    This Visit's Progress: 50% Recent Progress: 80%    Priority: High    Note:     Barriers: insurance stopped   Strengths: strong advocate for himself.   Patient expressed understanding of goal: yes  Action steps to achieve this goal:  1. I will attend appointment with [facility] RN Jacinto to complete to [facility] application. Completed  2. I understand Jacinto will fax application to [facility]. Completed  3. I will report progress towards this goal at scheduled outreach telephone calls from my Care Coordination team.   Discussed on 7/10/24                              Intervention/Education provided during outreach: Discussed the importance of taking his medications as directed. Encouraged him to contact Care Coordination for any additional needs or concerns.       Plan: CCC RN will continue to monitor, support patient with current goals and will be available to assist as nursing needs arise.     Care Coordinator will follow up in one month.

## 2024-08-20 ENCOUNTER — PATIENT OUTREACH (OUTPATIENT)
Dept: CARE COORDINATION | Facility: CLINIC | Age: 64
End: 2024-08-20

## 2024-08-21 NOTE — PROGRESS NOTES
Clinic Care Coordination Contact  Follow Up Progress Note      Assessment: CCC RN spoke with patient today to follow up on goal and to discuss any needs or concerns. Patient stated he has not heard back from [employer] regarding insurance application. Per his request, writer and patient contacted [facility] to determine status of application sent on 7/10/24. Writer spoke with representative Katina at [facility]. She stated it could take up to 45 days for them to process his application. She recommended patient to additionally apply on-line for faster processing. Since patient does not have a computer, she recommend patient contact an insurance advocate who could assist him with filling out on-line. Writer and patient contacted two insurance advocated, but was only able to leave a messages. Writer will continue to work on getting patient connected with an insurance advocate to assist him with his goal of getting insurance in place.  No other needs or concerns at this time.      Care Gaps:    Health Maintenance Due   Topic Date Due    ZOSTER IMMUNIZATION (1 of 2) Never done    RSV VACCINE (Pregnancy & 60+) (1 - 1-dose 60+ series) Never done    NICOTINE/TOBACCO CESSATION COUNSELING Q 1 YR  04/27/2023    YEARLY PREVENTIVE VISIT  04/27/2023    PHQ-2 (once per calendar year)  01/01/2024    LIPID  09/12/2024    LUNG CANCER SCREENING  09/25/2024       Patient not interested in scheduling appointment with PCP until he has insurance in place.     Care Plans  Care Plan: Transportation       Problem: Lack of transportation       Long-Range Goal: I would like to have [employer] to assist me with my transporation needs.       Start Date: 9/29/2023 Expected End Date: 1/29/2024    Recent Progress: 50%    Priority: High    Note:     Barriers: limited transportation options  Strengths: strong advocate for himself.  Patient expressed understanding of goal: yes  Action steps to achieve this goal:  1. I understand the [facility] RN Jacinto will mail an  application for [employer] to my address.   2. I will complete all the highlighted sections of the application and will bring to Dr. Webb to complete at my next appointment.  3. I understand a representative from the [facility] who manages [employer] will contact me when approved.   4. I will report progress towards this goal at scheduled outreach telephone calls from my Care Coordination team.     Discussed 8/21/24                            Care Plan: General       Problem: HP GENERAL PROBLEM       Goal: I would like to be better about attening my appointments.       Start Date: 6/19/2024 Expected End Date: 9/22/2024    Recent Progress: 30%    Priority: High    Note:     Barriers: difficulty getting to appointments related to limited transportation  Strengths: strong advocate for his wife  Patient expressed understanding of goal: yes  Action steps to achieve this goal:  1. I understand my CCC team will send me a monthly calendar of appointments.   2. I will place it in a prominent location in my home so I can see it every day.   3. I will  complete [employer] application, so I have better transportation options to get to my appointments.   4. I will report progress towards this goal at scheduled outreach calls from my Care Coordination team.   Discussed on 7/10/24                            Care Plan: Financial Wellbeing       Problem: Patient expresses financial resource strain       Goal: I would like to have my insurance back in place.       Start Date: 12/6/2023 Expected End Date: 3/6/2024    Recent Progress: 50%    Priority: High    Note:     Barriers: insurance stopped   Strengths: strong advocate for himself.   Patient expressed understanding of goal: yes  Action steps to achieve this goal:  1. I will attend appointment with [facility] RN Jacinto to complete to [facility] application. Completed  2. I understand Jacinto will fax application to [facility]. Completed  3. I will report progress  towards this goal at scheduled outreach telephone calls from my Care Coordination team.   Discussed on 8/21/24                              Intervention/Education provided during outreach: Discussed the importance of taking his medications as directed. Encouraged him to contact Care Coordination for any additional needs or concerns.     Plan: CCC RN will continue to monitor, support patient with current goal and will be available to assist as nursing needs arise.     Care Coordinator will follow up in one month.

## 2024-08-23 ENCOUNTER — PATIENT OUTREACH (OUTPATIENT)
Dept: CARE COORDINATION | Facility: CLINIC | Age: 64
End: 2024-08-23

## 2024-08-23 DIAGNOSIS — I48.19 PERSISTENT ATRIAL FIBRILLATION (H): ICD-10-CM

## 2024-08-23 RX ORDER — METOPROLOL SUCCINATE 200 MG/1
200 TABLET, EXTENDED RELEASE ORAL DAILY
Qty: 90 TABLET | Refills: 0 | Status: SHIPPED | OUTPATIENT
Start: 2024-08-23

## 2024-08-23 NOTE — PROGRESS NOTES
Clinic Care Coordination Contact  Spoke with  this morning. She will be calling patient to complete on-line application on September 3, 2024. Patient notified.

## 2024-09-12 ENCOUNTER — PATIENT OUTREACH (OUTPATIENT)
Dept: CARE COORDINATION | Facility: CLINIC | Age: 64
End: 2024-09-12

## 2024-09-12 NOTE — PROGRESS NOTES
Clinic Care Coordination Contact  [facility]/Voicemail    Clinical Data: Care Coordinator Outreach    Outreach Documentation Number of Outreach Attempt   9/12/2024  10:54 AM 1       Left message on patient's voicemail with call back information and requested return call.    Plan: Care Coordinator will try to reach patient again in 3-5 business days.    David Myhre, RN   [facility] RN  [phone]

## 2024-09-25 ENCOUNTER — PATIENT OUTREACH (OUTPATIENT)
Dept: CARE COORDINATION | Facility: CLINIC | Age: 64
End: 2024-09-25

## 2024-10-25 ENCOUNTER — PATIENT OUTREACH (OUTPATIENT)
Dept: CARE COORDINATION | Facility: CLINIC | Age: 64
End: 2024-10-25

## 2024-10-25 NOTE — PROGRESS NOTES
Follow Up Progress Note      Assessment: [facility] RN spoke with patient today regarding lack of insurance. Writer again schedule patient with a , Jacinto Rueda [phone] to complete application for MA. Patient is scheduled for a telephone appointment with the  on 10/28/24 at 1 pm. Encouraged patient to be available for the appointment to get is MA back in place.     Care Gaps:    Health Maintenance Due   Topic Date Due    ZOSTER IMMUNIZATION (1 of 2) Never done    RSV VACCINE (1 - Risk 60-74 years 1-dose series) Never done    NICOTINE/TOBACCO CESSATION COUNSELING Q 1 YR  04/27/2023    YEARLY PREVENTIVE VISIT  04/27/2023    PHQ-2 (once per calendar year)  01/01/2024    INFLUENZA VACCINE (1) 09/01/2024    COVID-19 Vaccine (5 - 2024-25 season) 09/01/2024    BMP  09/12/2024    LIPID  09/12/2024    LUNG CANCER SCREENING  09/25/2024       Scheduled with PCP on 11/18/24       Care Plans  Care Plan: Transportation       Problem: Lack of transportation       Long-Range Goal: I would like to have [employer] to assist me with my transporation needs.       Start Date: 9/29/2023 Expected End Date: 1/29/2024    Recent Progress: 50%    Priority: High    Note:     Barriers: limited transportation options  Strengths: strong advocate for himself.  Patient expressed understanding of goal: yes  Action steps to achieve this goal:  1. I understand the [facility] RN Jacinto will mail an application for [employer] to my address.   2. I will complete all the highlighted sections of the application and will bring to Dr. Webb to complete at my next appointment.  3. I understand a representative from the [facility] who manages [employer] will contact me when approved.   4. I will report progress towards this goal at scheduled outreach telephone calls from my Care Coordination team.     Discussed 8/21/24                            Care Plan: General       Problem: HP GENERAL PROBLEM       Goal: I would like to be better  about attening my appointments.       Start Date: 6/19/2024 Expected End Date: 9/22/2024    Recent Progress: 30%    Priority: High    Note:     Barriers: difficulty getting to appointments related to limited transportation  Strengths: strong advocate for his wife  Patient expressed understanding of goal: yes  Action steps to achieve this goal:  1. I understand my CCC team will send me a monthly calendar of appointments.   2. I will place it in a prominent location in my home so I can see it every day.   3. I will  complete [employer] application, so I have better transportation options to get to my appointments.   4. I will report progress towards this goal at scheduled outreach calls from my Care Coordination team.   Discussed on 7/10/24                            Care Plan: Financial Wellbeing       Problem: Patient expresses financial resource strain       Goal: I would like to have my insurance back in place.       Start Date: 12/6/2023 Expected End Date: 3/6/2024    Recent Progress: 50%    Priority: High    Note:     Barriers: insurance stopped   Strengths: strong advocate for himself.   Patient expressed understanding of goal: yes  Action steps to achieve this goal:  1. I will attend appointment with [facility] RN Jacinto to complete to [facility] application. Completed  2. I understand Jacinto will fax application to [facility]. Completed  3. I will report progress towards this goal at scheduled outreach telephone calls from my Care Coordination team.   Discussed on 8/21/24                              Intervention/Education provided during outreach: Encouraged him to contact Care Coordinator for any additional needs or concerns.      Plan: CCC RN will continue to monitor support patient with current goal and will be available to assist as nursing need arise.     Care Coordinator will follow up in one week.

## 2024-10-25 NOTE — Clinical Note
[facility] CARE COORDINATION  ***  October 25, 2024    Damien Mcclendon  [address]  [place] [address]      Dear Damien,    I am a {clinic role :[id number]}

## 2024-11-12 DIAGNOSIS — I10 ESSENTIAL HYPERTENSION, BENIGN: ICD-10-CM

## 2024-11-12 DIAGNOSIS — E78.2 MIXED HYPERLIPIDEMIA: ICD-10-CM

## 2024-11-12 RX ORDER — ROSUVASTATIN CALCIUM 10 MG/1
10 TABLET, COATED ORAL
Qty: 90 TABLET | Refills: 1 | Status: SHIPPED | OUTPATIENT
Start: 2024-11-12

## 2024-11-12 RX ORDER — LISINOPRIL 5 MG/1
5 TABLET ORAL DAILY
Qty: 90 TABLET | Refills: 0 | OUTPATIENT
Start: 2024-11-12

## 2024-11-12 NOTE — TELEPHONE ENCOUNTER
"9/12/2023 Progress Note by Dr. Webb states \" He is noting lightheadedness and dizziness. Discontinue lisinopril at this point.\"    9/8/2024-9/18/2024 patient hospitalized at [facility] after fall. Documentation from [facility] hospitalized does not mention lisinopril.      Spoke with patient. He states he has been taking lisinopril 5 mg. Patient states he ran out about a week ago. He does not monitor blood pressure consistently. Informed patient that Dr. Webb would be updated that he has been taking the lisinopril 5 mg daily and we would update him with Dr. Webb's recommendation.     Patient confirms taking rosuvastatin 10 mg daily.     Patient has appointment with Dr. Webb 11/18/2024.  "

## 2024-11-12 NOTE — TELEPHONE ENCOUNTER
They had a very hard time keeping his blood pressure up in the hospital.  Please review discharge summary.  I urge him to not take the lisinopril at this time.  It is imperative he follow-up with me for hospital follow-up.  Thank you.

## 2024-12-26 ENCOUNTER — PATIENT OUTREACH (OUTPATIENT)
Dept: CARE COORDINATION | Facility: CLINIC | Age: 64
End: 2024-12-26

## 2024-12-26 NOTE — PROGRESS NOTES
Clinic Care Coordination Contact  [facility]/Voicemail    Clinical Data: Care Coordinator Outreach    Outreach Documentation Number of Outreach Attempt   12/26/2024  11:39 AM 1       Left message on patient's voicemail with call back information and requested return call.      Plan:  Care Coordinator will try to reach patient again in 3-5 business days.    David Myhre, RN   [facility] RN  [phone]

## 2025-01-02 ENCOUNTER — TELEPHONE (OUTPATIENT)
Dept: CARE COORDINATION | Facility: CLINIC | Age: 65
End: 2025-01-02

## 2025-01-02 NOTE — TELEPHONE ENCOUNTER
Rachelle Webb,     I just received a call from [facility]. He said he apologized for cancelling his appointment today, but he stated he just did not feel well enough to come in today. I strongly encouraged him to be seen, but he said he already cancelled and rescheduled for 1/16/24. He said he would attend this appointment. The good news is he finally has insurance in place again. I will call him before his appointment on the 16th to remind about the appointment.     Thanks,     Santiago

## 2025-01-19 NOTE — PROGRESS NOTES
"Damien was originally discharged from [facility] due to noncompliance on 4/2/24. A new referral was placed because he had an appointment but then he did not show.  The new referral will be discontinued due to not showing for appointment and original [facility] referral being \"completed\".  If he needs [facility] management in the future, he will need an appointment with provider and all new orders will have to be placed.    Adrianna Bergman RN   " Vaccine status unknown

## 2025-02-11 ENCOUNTER — PATIENT OUTREACH (OUTPATIENT)
Dept: CARE COORDINATION | Facility: CLINIC | Age: 65
End: 2025-02-11
Payer: COMMERCIAL

## 2025-02-11 NOTE — PROGRESS NOTES
Clinic Care Coordination Contact  [facility]/Voicemail    Clinical Data: Care Coordinator Outreach    Outreach Documentation Number of Outreach Attempt   2/11/2025   9:54 AM 1       Left message on patient's voicemail with call back information and requested return call.      Plan: Care Coordinator will try to reach patient again in one week.    David Myhre, RN   [facility] RN  [phone]

## 2025-02-16 ENCOUNTER — TELEPHONE (OUTPATIENT)
Dept: INTERNAL MEDICINE | Facility: CLINIC | Age: 65
End: 2025-02-16
Payer: COMMERCIAL

## 2025-02-16 DIAGNOSIS — Z86.718 PERSONAL HISTORY OF VENOUS THROMBOSIS AND EMBOLISM: ICD-10-CM

## 2025-02-16 DIAGNOSIS — Z79.01 LONG TERM CURRENT USE OF ANTICOAGULANT THERAPY: ICD-10-CM

## 2025-02-16 DIAGNOSIS — Z86.718 HISTORY OF DVT (DEEP VEIN THROMBOSIS): ICD-10-CM

## 2025-02-16 DIAGNOSIS — I48.91 ATRIAL FIBRILLATION (H): ICD-10-CM

## 2025-02-17 RX ORDER — WARFARIN SODIUM 5 MG/1
TABLET ORAL
Qty: 30 TABLET | Refills: 0 | Status: SHIPPED | OUTPATIENT
Start: 2025-02-17

## 2025-02-17 NOTE — TELEPHONE ENCOUNTER
Patient has been discharged from anticoagulation clinic due to noncompliance.  I am not willing to continue to prescribe this medication without regular follow-up.  We have tried to get him in to see me for follow-up.  Cancels or does not show on multiple occasions.  He needs to be seen.  If he is unwilling to come here then he should go elsewhere for care.  Are there are some free clinics available for him if cost is the concern?

## 2025-02-18 NOTE — TELEPHONE ENCOUNTER
[facility] - [address] [address] [phone] and [address] [address] same phone number. The last time I talked to patient he reported his MA was active.

## 2025-02-19 ENCOUNTER — TELEPHONE (OUTPATIENT)
Dept: INTERNAL MEDICINE | Facility: CLINIC | Age: 65
End: 2025-02-19
Payer: COMMERCIAL

## 2025-02-19 NOTE — TELEPHONE ENCOUNTER
Patient returned call.     Informed patient that he has been discharged from the anticoagulation clinic due to not having INR drawn.     Informed patient that Dr. Webb sent in a small refill of warfarin, but it is important that he have follow up. Discussed with patient that many previous cancellations are noted, asked patient if there is any thing we can help with or what the barrier is for appointments. Patient states he injured his leg over the summer and has difficulty getting around. Patient is agreeable to appointment tomorrow with Dr. Webb. Emphasized importance of appointment for follow up. Patient verbalized understanding.

## 2025-02-19 NOTE — TELEPHONE ENCOUNTER
Made his appt for 40 mins for tomorrow 2/20       ----- Message from ANTHONY COX sent at 2/19/2025  5:45 PM CST -----  Please make appointment with me tomorrow for 40 minutes.  I have not seen him in ages and we have a lot to discuss.

## 2025-02-20 ENCOUNTER — PATIENT OUTREACH (OUTPATIENT)
Dept: CARE COORDINATION | Facility: CLINIC | Age: 65
End: 2025-02-20

## 2025-02-20 NOTE — TELEPHONE ENCOUNTER
Writer contacted patient to discuss recent cancellations and no-show to clinic.  Writer asked patient what barriers he is experiencing in keeping clinic appointments.  Patient states he cancelled today's appointment due to inability to walk, dizziness and diarrhea.  Patient states he knows it's been a while since he was seen in clinic and asks to keep the March 5 appointment.     Patient was advised that it has been over a year since he has been seen in clinic.  Patient was educated on the importance of attending visits in safely managing his medication and complex care needs.  Patient expressed understanding and related that he respects Dr. Webb and the long-time PCP relationship.  Patient was advised to return call to JAIME Casey and discuss barriers in attending visits to see if there are any community resources available to help overcome this.      Writer huddled with PCP to give update on call with patient.    Lori Parekh M.A., LPN  Clinic Manager  [facility]

## 2025-02-20 NOTE — PROGRESS NOTES
Clinic Care Coordination Contact  Follow Up Progress Note      Assessment: CCC RN spoke with patient today to discuss transportation concern. Patient agreeable to completed [employer] application after appointment with PCP on 3/5/25. Patient has been sent [employer] applications in the past to patient, but he said he has difficulty completing paperwork on his own. New goal created around getting [employer] in place.     Care Gaps:    Health Maintenance Due   Topic Date Due    ZOSTER IMMUNIZATION (1 of 2) Never done    RSV VACCINE (1 - Risk 60-74 years 1-dose series) Never done    YEARLY PREVENTIVE VISIT  04/27/2023    INFLUENZA VACCINE (1) 09/01/2024    COVID-19 Vaccine (5 - 2024-25 season) 09/01/2024    BMP  09/12/2024    LIPID  09/12/2024    LUNG CANCER SCREENING  09/25/2024    PHQ-2 (once per calendar year)  01/01/2025       Scheduled with PCP on 3/5/25      Care Plans  Care Plan: General       Problem: HP GENERAL PROBLEM       Long-Range Goal: I will be better about attending my appointmets as scheduled.       Start Date: 1/3/2025 Expected End Date: 1/2/2026    This Visit's Progress: 10% Recent Progress: 10%    Priority: High    Note:     Barriers: high appointment cancellation rate  Strengths: strong advocate for himself.   Patient expressed understanding of goal: yes  Action steps to achieve this goal:  1. I will attend all schedule appointments with Dr. Webb and  INR lab draws.  2. I understand the [facility] RN Jacinto will provide me with reminder calls piror to my upcoming appointments .     Discussed on 2/20/24                               Care Plan: Transportation       Problem: Lack of transportation       Goal: I would like to have [employer] for my transporation needs.       Start Date: 2/20/2025 Expected End Date: 2/19/2026    This Visit's Progress: 10%    Priority: High    Note:     Barriers: limited transportation options, mobility issues.   Strengths: strong advocate for himself.    Patient expressed understanding of goal: yes  Action steps to achieve this goal:  1. I will meet with Santiago after appointment with Dr. Webb on 3/5/25 to complete the [employer] Application.   2. I understand the [facility] will contact me directly when my [employer] is in place.   3. I will report progress towards this goal as scheduled outreaches fro my Care Coordination team.                                Intervention/Education provided during outreach: Scheduled patient to complete [employer] application after appointment with PCP on 3/5/25. Strongly encouraged him to attend appointment with PCP on 3/5/25.      Outreach Frequency: monthly, more frequently as needed    Plan: CCC RN will continue to monitor, support patient with current goals and will be available to assist as nursing needs arise.     Care Coordinator will follow up on 3/5/25

## 2025-03-25 ENCOUNTER — PATIENT OUTREACH (OUTPATIENT)
Dept: CARE COORDINATION | Facility: CLINIC | Age: 65
End: 2025-03-25
Payer: COMMERCIAL

## 2025-03-25 NOTE — PROGRESS NOTES
Follow Up Progress Note      Assessment: [facility] RN spoke with patient today to follow up on goals and to discuss any needs or concerns. Patient reported he plans on attending appointment with PCP on 3/27/25. Writer will assist him with completing [employer] application following appointment with PCP.     Care Gaps:    Health Maintenance Due   Topic Date Due    ZOSTER IMMUNIZATION (1 of 2) Never done    RSV VACCINE (1 - Risk 60-74 years 1-dose series) Never done    YEARLY PREVENTIVE VISIT  04/27/2023    INFLUENZA VACCINE (1) 09/01/2024    COVID-19 Vaccine (5 - 2024-25 season) 09/01/2024    BMP  09/12/2024    LIPID  09/12/2024    LUNG CANCER SCREENING  09/25/2024    PHQ-2 (once per calendar year)  01/01/2025       Scheduled with PCP on 3/27/25      Care Plans  Care Plan: General       Problem: HP GENERAL PROBLEM       Long-Range Goal: I will be better about attending my appointmets as scheduled.       Start Date: 1/3/2025 Expected End Date: 1/2/2026    Recent Progress: 10%    Priority: High    Note:     Barriers: high appointment cancellation rate  Strengths: strong advocate for himself.   Patient expressed understanding of goal: yes  Action steps to achieve this goal:  1. I will attend all schedule appointments with Dr. Webb and  INR lab draws.  2. I understand the [facility] RN Jacinto will provide me with reminder calls piror to my upcoming appointments .     Discussed on 3/25/25                               Care Plan: Transportation       Problem: Lack of transportation       Goal: I would like to have [employer] for my transporation needs.       Start Date: 2/20/2025 Expected End Date: 2/19/2026    Recent Progress: 10%    Priority: High    Note:     Barriers: limited transportation options, mobility issues.   Strengths: strong advocate for himself.   Patient expressed understanding of goal: yes  Action steps to achieve this goal:  1. I will meet with Santiago after appointment with Dr. Webb on 3/5/25 to complete the  [employer] Application.   2. I understand the [facility] will contact me directly when my [employer] is in place.   3. I will report progress towards this goal as scheduled outreaches fro my Care Coordination team.      Discussed on 3/25/25                              Intervention/Education provided during outreach: Discussed the importance of attending appointment with PCP on on 3/27/25.      Outreach Frequency: monthly, more frequently as needed    Plan: CCC RN will continue to monitor, support patient with current goal and will be available to assist as nursing needs arise.     Care Coordinator will follow up on 3/27/25

## 2025-04-08 ENCOUNTER — TELEPHONE (OUTPATIENT)
Dept: INTERNAL MEDICINE | Facility: CLINIC | Age: 65
End: 2025-04-08
Payer: COMMERCIAL

## 2025-04-08 ENCOUNTER — NURSE TRIAGE (OUTPATIENT)
Dept: INTERNAL MEDICINE | Facility: CLINIC | Age: 65
End: 2025-04-08
Payer: COMMERCIAL

## 2025-04-08 DIAGNOSIS — Z86.718 PERSONAL HISTORY OF VENOUS THROMBOSIS AND EMBOLISM: ICD-10-CM

## 2025-04-08 DIAGNOSIS — I48.91 ATRIAL FIBRILLATION (H): ICD-10-CM

## 2025-04-08 DIAGNOSIS — I48.19 PERSISTENT ATRIAL FIBRILLATION (H): ICD-10-CM

## 2025-04-08 DIAGNOSIS — Z79.01 LONG TERM CURRENT USE OF ANTICOAGULANT THERAPY: ICD-10-CM

## 2025-04-08 DIAGNOSIS — Z86.718 HISTORY OF DVT (DEEP VEIN THROMBOSIS): ICD-10-CM

## 2025-04-08 DIAGNOSIS — I48.19 PERSISTENT ATRIAL FIBRILLATION (H): Primary | ICD-10-CM

## 2025-04-08 NOTE — TELEPHONE ENCOUNTER
LM for patient to call back to clinic. Patient requested a refill of Warafin but patient's anticoagulation is not handled by his primary care team. Patient needs to reach out to his anticoagulation team to get this medication refilled.

## 2025-04-09 RX ORDER — WARFARIN SODIUM 5 MG/1
TABLET ORAL
Qty: 7 TABLET | Refills: 0 | Status: SHIPPED | OUTPATIENT
Start: 2025-04-09

## 2025-04-09 RX ORDER — DIGOXIN 250 MCG
250 TABLET ORAL
COMMUNITY
Start: 2024-09-18 | End: 2025-04-09

## 2025-04-09 RX ORDER — DIGOXIN 250 MCG
250 TABLET ORAL DAILY
Qty: 7 TABLET | Refills: 0 | Status: SHIPPED | OUTPATIENT
Start: 2025-04-09

## 2025-04-09 NOTE — TELEPHONE ENCOUNTER
I have not seen patient in over a year.  I am unwilling to continue prescribing a very high risk medication such as warfarin without regular follow-up visits.  Despite multiple requests to be seen he has not shown up.  He has same-day canceled or failed multiple visits.  I would urge him to come in this week for a visit and for blood work if he wants refills.  I am happy to give him 1 week of medication to get him through to an appointment.  Please put these in for authorization if he is willing to proceed.  If he makes an appointment and does not show up we will have to terminate care.

## 2025-04-09 NOTE — TELEPHONE ENCOUNTER
"Patient returning call. Reports that Dr. eWbb \"has always managed\" his warfarin. Reports he has not had an INR check since hospital discharge back in September. He has been taking 2.5 mg Carter/M/T/W/Th/F and 5 mg on Saturdays.    He is also requesting digoxin 250 mcg daily, reports this was started during September hospitalization and he has been out for some time. Advised and discussed the importance of hospital follow up with PCP to reconcile medications and ensure correct monitoring, though patient requests refills addressed prior to scheduling. Routing to PCP for review as well as nurse pool to follow up and assist in coordinating care. Thanks!    Gabby Ellison RN    "

## 2025-04-09 NOTE — TELEPHONE ENCOUNTER
Relayed provider advisement to patient.  Patient agreeable to appointment this week.  Scheduled for 4/11/25.  Requesting Rxs fro warfarin and digoxin sent to [facility] [place].   [facility] Counseling:  I discussed with the patient the risks of imiquimod including but not limited to erythema, scaling, itching, weeping, crusting, and pain.  Patient understands that the inflammatory response to imiquimod is variable from person to person and was educated regarded proper titration schedule.  If flu-like symptoms develop, patient knows to discontinue the medication and contact us.

## 2025-04-10 RX ORDER — DILTIAZEM HYDROCHLORIDE 120 MG/1
120 CAPSULE, COATED, EXTENDED RELEASE ORAL DAILY
Qty: 90 CAPSULE | Refills: 1 | OUTPATIENT
Start: 2025-04-10

## 2025-04-10 NOTE — TELEPHONE ENCOUNTER
Spoke with patient and relayed recommendation from PCP.     States again he does not want a refill of Diltiazem.     Reinforced recommendation for ED evaluation. Patient states he thinks if he starts taking medication he will be fine. Reports he will have someone pick this up.    No further questions at time of call.

## 2025-04-10 NOTE — TELEPHONE ENCOUNTER
I am not willing to renew this diltiazem.  If he is that dizzy and falling down I am concerned that he has low blood pressure and he should not be on this medication.  I do think he should go to the emergency room for evaluation

## 2025-04-10 NOTE — TELEPHONE ENCOUNTER
I sent refills of his prescription to the pharmacy.  I do agree that he should go to the emergency room if he cannot even walk and if he hit his head.  I would recommend calling 911.

## 2025-04-10 NOTE — TELEPHONE ENCOUNTER
"Nurse Triage SBAR    Is this a 2nd Level Triage? YES, LICENSED PRACTITIONER REVIEW IS REQUIRED    Situation: Dizziness, loss of consciousness.     Background: A-Fib, anticoagulant     Assessment:   Patient contacted regarding refill request for Diltiazem. At time of call patient states he \"blacked out\" while using the bathroom \"the other day.\" Reports frequently having to crawl to the bathroom to use it due to dizziness and feeling like he is going to faint. States this is from his blood pressure dropping and states he needs the medication. Informed Diltiazem can lower blood pressure. After clarification patient reports he needs Digoxin. Reports being out of Digoxin for a couple months. Informed this was sent in however recommend patient be seen in the ER given loss of consciousness episodes. Patient declined and states he was told to stop taking Diltiazem.    Denies injury from loss of consciousness episodes such as hitting head. Patient taking Warfarin.    Patient reports using a walker at home provided by his wife for transportation however reports not being able to stand for an extended period of time.    Denies chest pain, heart racing or palpitations, or difficulty breathing at rest.    Protocol Recommended Disposition:   Go to ED Now    Recommendation: Be seen in the ED. Patient declined. States \"I will be fine\" until appointment on 4/17. Reports lives with wife.     When asking patient questions patient states he does not want to get upset. Informed the reason for questions is for patients safety.     Routing for provider review.     Routed to provider    Does the patient meet one of the following criteria for ADS visit consideration? 16+ years old, with an [facility] PCP     TIP  Providers, please consider if this condition is appropriate for management at one of our Acute and Diagnostic Services sites.     If patient is a good candidate, please use dotphrase <dot>triageresponse and select Refer to ADS to " document.      Reason for Disposition   Fainted > 15 minutes ago and still feels weak or dizzy    Additional Information   Negative: Still unconscious   Negative: Still feels dizzy or lightheaded   Negative: Difficult to awaken or acting confused (e.g., disoriented, slurred speech)   Negative: Difficulty breathing   Negative: Bluish (or gray) lips or face   Negative: Shock suspected (e.g., cold/pale/clammy skin, too weak to stand, low BP, rapid pulse)   Negative: Bleeding (e.g., vomiting blood, rectal bleeding or tarry stools, severe vaginal bleeding)   Negative: Chest pain   Negative: Extra heartbeats, irregular heart beating, or heart is beating very fast (i.e., 'palpitations')   Negative: Heart beating < 50 beats per minute OR > 140 beats per minute   Negative: Fainted suddenly after medicine, allergic food or bee sting   Negative: Sounds like a life-threatening emergency to the triager   Negative: Has diabetes (diabetes mellitus) and fainting from low blood glucose (70 mg/dL [3.9 mmol/L] or below)   Negative: Seizure suspected (e.g., muscle jerking or shaking followed by confusion)   Negative: Heat exhaustion suspected (i.e., dehydration from heat exposure)   Negative: Fainted > 15 minutes ago and still looks pale (pale skin, pallor)    Protocols used: [id number]

## 2025-04-17 ENCOUNTER — TELEPHONE (OUTPATIENT)
Dept: INTERNAL MEDICINE | Facility: CLINIC | Age: 65
End: 2025-04-17

## 2025-04-17 NOTE — TELEPHONE ENCOUNTER
Patient did not attend his scheduled appointment today.  Patient states he woke up late and had issues with transportation.      Patient then states he has not picked up his medication that was sent to his pharmacy due to the pharmacy not having the digoxin in stock.  Writer contact patient's pharmacy, [facility], and spoke with Aziza, pharmacy technician who confirmed that digoxin was not in stock until 4/16/2025.    Patient states he plans to be seen in the ER due to his balance and dizziness issues.    Writer will update PCP and Medical Director regarding phone call to discuss next steps in patient's care.    Lori Parekh M.A., LPN  Clinic Manager  [facility]

## 2025-04-21 ENCOUNTER — TELEPHONE (OUTPATIENT)
Dept: INTERNAL MEDICINE | Facility: CLINIC | Age: 65
End: 2025-04-21
Payer: COMMERCIAL

## 2025-04-21 NOTE — Clinical Note
Dr. Reddy,  This patient was added to your schedule but he has a long history of no-show or cancelling within 2 hours of the visit.  I am in the process of removing Dr. Webb as his PCP and the patient will need to re-establish care with a new provider.  If he attends the appointment, I have a Partnership in Care Agreement to review with him and sign that includes his agreement in keeping appointments as scheduled.    Lori

## 2025-04-21 NOTE — TELEPHONE ENCOUNTER
"Patient was transferred to writer today regarding his warfarin prescription not being refilled.  Patient was advised that Dr. Webb has requested that patient be seen in clinic multiple times due to the need for close monitoring and that he has not been seen in clinic for over a year.  Patient proceeded to yell at writer that he has not been feeling well and not able to get to the clinic.  Patient was advised that writer is aware of the challenges and that the clinic has been attempting to assist with addressing his barriers by working through Care Coordination to complete [employer].  Patient states his prescription wasn't available until today and was reminded that when we spoke last week he was aware that it was ready at his pharmacy.  Patient was asked if he went to the emergency room for evaluation as recommended and stated he did not as he didn't want to pay for an emergency visit.     Patient states he continues to experience symptoms of vertigo and was again advised to seek care as this has been preventing him from standing and seeking clinical evaluation.  Patient refused recommendation and states he just wants his medication refilled.      Patient was provided with an appointment to see a new PCP tomorrow and stated he \"will try to make it\".  Patient was advised that the clinic cannot continue to make appointments that the patient does not attend as it takes appointments away from other patients.  Patient stated he will attend appointment on 4/22/2025.  He was reminded of the clinic address and asked to arrive at 2:30 to complete registration and paperwork.  Patient advised that he will need to sign a Partnership in Care Agreement that includes attending appointments as recommended and scheduled by his care team.  Patient expressed understanding.    Lori Parekh M.A., LPN  Clinic Manager  [facility]      "

## 2025-04-22 ENCOUNTER — TELEPHONE (OUTPATIENT)
Dept: INTERNAL MEDICINE | Facility: CLINIC | Age: 65
End: 2025-04-22

## 2025-04-22 NOTE — TELEPHONE ENCOUNTER
Writer contacted patient regarding his missed appointment today.  Patient states he fell asleep and forgot about the appointment.  Patient had additional reasons for not picking up his medication stating it wasn't ready.  When patient was advised that writer called the pharmacy last week, he then stated he didn't have transportation to  the medication.  He then stated he called and was told it wasn't ready yet.  Later in the call, he stated that his friend went to pick it up on Saturday and the prescription wasn't available.      Patient asked to reschedule appointment and was advised that writer will need to discuss this with medical director given the ongoing history of scheduling and not attending appointments.    Patient told writer that he is unable to stand without dizziness and was advised that he needs to be seen.  Patient states he can ambulate short distances with a walker, but not able to leave the house even for medical care.  Patient declined recommendations for emergency or urgent care.       huddled with Dr. Webb who recommended a call to 9-1-1 for a wellness check.  Kater contacted [facility] who will send EMS medical out for a wellness check.    Lori Parekh M.A., LPN  Clinic Manager  [facility]

## 2025-04-24 ENCOUNTER — TELEPHONE (OUTPATIENT)
Dept: INTERNAL MEDICINE | Facility: CLINIC | Age: 65
End: 2025-04-24
Payer: COMMERCIAL

## 2025-04-24 NOTE — TELEPHONE ENCOUNTER
Patient was transported to [facility] via EMS on 4/22/2025 where he was admitted for failure to thrive.  Patient's admission was discussed with Dr. Centeno due to administrative discharge from the [facility] location due to repeated failure to keep appointments necessary to his medical care.  [facility] does not have the updated information regarding this decision and termination of the PCP assignment.      Dr. Centeno is willing to provide interim support if needed.  However, patient will need post-hospital care when discharged from the inpatient unit and will need to establish with a new PCP.  To help with coordination of care, writer attempted to contact REVA Weinstein at [facility] and left a message requesting a call back.  Writer will advise  of the need to include establishing care as part of his post-hospital care plan.    Lori BLAKE  Clinic Manager  [facility]

## 2025-05-05 ENCOUNTER — TELEPHONE (OUTPATIENT)
Dept: INTERNAL MEDICINE | Facility: CLINIC | Age: 65
End: 2025-05-05
Payer: COMMERCIAL

## 2025-05-05 NOTE — TELEPHONE ENCOUNTER
May 5, 2025    Home health orders was received via fax for Dr. Webb.  Patient label was attached to paperwork and placed in provider's inbox to be signed.    Antonieta Vences

## 2025-05-12 ENCOUNTER — TELEPHONE (OUTPATIENT)
Dept: INTERNAL MEDICINE | Facility: CLINIC | Age: 65
End: 2025-05-12
Payer: COMMERCIAL

## 2025-05-12 NOTE — TELEPHONE ENCOUNTER
May 12, 2025    Home health orders was received via fax for Dr. Webb.  Patient label was attached to paperwork and placed in provider's inbox to be signed.    Antonieta Vences

## 2025-05-14 NOTE — TELEPHONE ENCOUNTER
May 14, 2025    Home health orders was picked up from outbox of Dr. Centeno and sent via fax to [phone].    Antonieta Vences

## 2025-05-15 ENCOUNTER — TELEPHONE (OUTPATIENT)
Dept: INTERNAL MEDICINE | Facility: CLINIC | Age: 65
End: 2025-05-15
Payer: COMMERCIAL

## 2025-05-15 NOTE — TELEPHONE ENCOUNTER
Home Care is calling regarding an established patient with [facility].  Requesting orders from: Ronald Webb  RN APPROVED: RN able to provide verbal orders.  Home Care will send orders for signature.  RN will close encounter.  Is this a request for a temporary pause in the home care episode?  No    Orders Requested    Physical Therapy  Request for continuation of care with no increase or decrease in frequency  Frequency: 1x a week x 3 weeks starting on 5/18/25  RN gave verbal order: Yes            Contacts       Contact Date/Time Type Contact Phone/Fax    05/15/2025 01:53 PM CDT Phone (Incoming) Karson [phone]     PT          Nimo Isbell, RN

## 2025-05-19 ENCOUNTER — TELEPHONE (OUTPATIENT)
Dept: INTERNAL MEDICINE | Facility: CLINIC | Age: 65
End: 2025-05-19
Payer: COMMERCIAL

## 2025-05-19 NOTE — TELEPHONE ENCOUNTER
May 19, 2025    Home health orders was received via fax for Dr. Centeno.  Patient label was attached to paperwork and placed in provider's inbox to be signed.    Antonieta Vences

## 2025-05-21 ENCOUNTER — TELEPHONE (OUTPATIENT)
Dept: INTERNAL MEDICINE | Facility: CLINIC | Age: 65
End: 2025-05-21
Payer: COMMERCIAL

## 2025-05-21 NOTE — TELEPHONE ENCOUNTER
Home Care is calling regarding an established patient with [facility].  Requesting orders from: Ronald WebbI: RN able to provide verbal orders.  Sending as FYI only.  Is this a request for a temporary pause in the home care episode?  No    Orders Requested  Home care calling in to inform Dr Webb that the patient was seen today for his visit and he has lost about 8 pounds in 3 weeks. States he was also unable to obtain orthostatic vital signs as the patient was trembling while holding on to his walker when standing. He was able to get a sitting BP of 103/68. States the patient was also complaining of being lightheaded.    Spoke with patient who states he has had these symptoms for a few weeks. States he also gets a little short of breath when he goes up stairs. He admits to only eating 1 meal per day and having small snacks throughout the rest of the day. States he also gets dizzy and lightheaded when he is standing in the kitchen preparing food. Writer offered a visit to be evaluated for his symptoms but he declines. States he has a visit coming up with his cardiologist and will follow up with them. He also admits to not seeing Dr Webb in over a year and has never had a visit with Dr Centeno, who previously, agreed to provide interim support to the patient.     Okay to leave a detailed message?: Yes    Contacts       Contact Date/Time Type Contact Phone/Fax    05/21/2025 03:17 PM CDT Phone (Incoming) [facility] [phone]     [facility] secure           Maurice Foreman RN

## 2025-05-21 NOTE — TELEPHONE ENCOUNTER
For background. I agreed to be short term resource, if needed, post-hospitalization but, to be clear, he has been formally dismissed from the [place] primary care clinic so will need to urgently set up an establish care visit with another clinic for further care/paperwork, etc.  If needed, please inform his home care team    Vish Centeno MD on 5/21/2025 at 4:18 PM

## 2025-05-22 NOTE — TELEPHONE ENCOUNTER
Forms returned to [facility], unable to sign  Pt is no longer being seen at [facility]

## 2025-05-22 NOTE — TELEPHONE ENCOUNTER
Forms returned to [facility]  Unable to sign, patient is no longer seen at [facility]

## 2025-05-27 ENCOUNTER — PATIENT OUTREACH (OUTPATIENT)
Dept: CARE COORDINATION | Facility: CLINIC | Age: 65
End: 2025-05-27
Payer: COMMERCIAL

## 2025-05-27 NOTE — PROGRESS NOTES
Clinic Care Coordination Contact    Situation: Patient chart reviewed by care coordinator.    Background: CCC Team has been working a goal with patient around better appointment attendance.     Assessment: Per chart review, patient has been formally dismissed from the [facility] related to high No Show rate.     Plan/Recommendations: Patient will be dis-enrolled from Care Coordination as he is no longer able to see a primary care provider at the [facility].     David Myhre, RN   [facility] RN  [phone]

## 2025-06-10 ENCOUNTER — MEDICAL CORRESPONDENCE (OUTPATIENT)
Dept: HEALTH INFORMATION MANAGEMENT | Facility: CLINIC | Age: 65
End: 2025-06-10
Payer: COMMERCIAL